# Patient Record
Sex: MALE | Race: WHITE | Employment: UNEMPLOYED | RURAL
[De-identification: names, ages, dates, MRNs, and addresses within clinical notes are randomized per-mention and may not be internally consistent; named-entity substitution may affect disease eponyms.]

---

## 2017-01-20 ENCOUNTER — OFFICE VISIT (OUTPATIENT)
Dept: FAMILY MEDICINE CLINIC | Age: 62
End: 2017-01-20

## 2017-01-20 VITALS
OXYGEN SATURATION: 97 % | HEART RATE: 66 BPM | SYSTOLIC BLOOD PRESSURE: 138 MMHG | RESPIRATION RATE: 12 BRPM | DIASTOLIC BLOOD PRESSURE: 86 MMHG | HEIGHT: 67 IN | TEMPERATURE: 97.2 F | WEIGHT: 175 LBS | BODY MASS INDEX: 27.47 KG/M2

## 2017-01-20 DIAGNOSIS — I21.4 NON-ST ELEVATION (NSTEMI) MYOCARDIAL INFARCTION (HCC): ICD-10-CM

## 2017-01-20 DIAGNOSIS — Z87.74 S/P SURGERY FOR COMPLEX CONGENITAL HEART DISEASE: ICD-10-CM

## 2017-01-20 DIAGNOSIS — I10 ESSENTIAL HYPERTENSION: ICD-10-CM

## 2017-01-20 DIAGNOSIS — K59.00 CONSTIPATION, UNSPECIFIED CONSTIPATION TYPE: ICD-10-CM

## 2017-01-20 DIAGNOSIS — E78.5 HYPERLIPIDEMIA, UNSPECIFIED HYPERLIPIDEMIA TYPE: ICD-10-CM

## 2017-01-20 DIAGNOSIS — R73.03 PREDIABETES: ICD-10-CM

## 2017-01-20 DIAGNOSIS — G62.9 NEUROPATHY: Primary | ICD-10-CM

## 2017-01-20 DIAGNOSIS — R10.32 LEFT LOWER QUADRANT PAIN: ICD-10-CM

## 2017-01-20 RX ORDER — OXYCODONE AND ACETAMINOPHEN 5; 325 MG/1; MG/1
1 TABLET ORAL
Qty: 15 TAB | Refills: 0 | Status: SHIPPED | OUTPATIENT
Start: 2017-01-20 | End: 2017-06-13 | Stop reason: SDUPTHER

## 2017-01-20 RX ORDER — ATORVASTATIN CALCIUM 10 MG/1
40 TABLET, FILM COATED ORAL DAILY
COMMUNITY
End: 2017-03-09 | Stop reason: ALTCHOICE

## 2017-01-20 RX ORDER — ALPRAZOLAM 0.25 MG/1
TABLET ORAL AS NEEDED
COMMUNITY
End: 2017-03-09

## 2017-01-20 RX ORDER — HYDROCODONE BITARTRATE AND ACETAMINOPHEN 5; 325 MG/1; MG/1
TABLET ORAL
COMMUNITY
End: 2017-01-20

## 2017-01-20 RX ORDER — ASPIRIN 81 MG/1
TABLET ORAL DAILY
COMMUNITY
End: 2018-12-11

## 2017-01-20 NOTE — PROGRESS NOTES
HISTORY OF PRESENT ILLNESS  Emerson Mathews is a 64 y.o. male. Chief Complaint   Patient presents with    Medication Refill       HPI  Has numbness in legs started after the abdominal aortic bypass surgery in 11/16 and 2 hernia surgeries with it   Then had MI and Cardiac bypass surgery in 11/16    Doing ok except soreness  But numbness in left leg from groin to foot, no loss of strength  Can walk but painful and tender  Pulling kind of pain and knee very tender  Can hardly walk  Discussed with the surgeon  Temporarily, but not getting better  Wanted to try Lyrica  Not currently on Neurontin, still has that at home  Needs referral to Neuro    Has pain management appt 2/3/17  Still out of work  Hydrocodone not helping  Percocet better and needs refill    On Xanax since surgeries  Anxiety attacks since then  Taking it prn, last 2 w ago    Constipation  Last BM 2 d ago  Tried Exlax  Worked only a little  Got Nausea yesterday    Here for BW    Review of Systems   Eyes: Positive for blurred vision (little). Respiratory: Negative for cough and shortness of breath. Cardiovascular: Negative for chest pain. Gastrointestinal: Positive for abdominal pain, constipation and nausea. Negative for blood in stool, diarrhea, melena and vomiting. Genitourinary: Positive for frequency. Negative for dysuria and urgency. Neurological: Negative for dizziness and headaches. Endo/Heme/Allergies: Negative for polydipsia. Past Medical History   Diagnosis Date    Bilateral hip pain     Colon polyps 2013    Diverticulitis     ED (erectile dysfunction)     Essential hypertension, benign 2006    Hypercholesterolemia     IGT (impaired glucose tolerance)     Metabolic syndrome     MI (myocardial infarction) ([facility] [address] 11/10/2016    Paresthesia of left foot     Smoker      Current Outpatient Prescriptions   Medication Sig Dispense Refill    atorvastatin (LIPITOR) 10 mg tablet Take  by mouth daily.       aspirin delayed-release 81 mg tablet Take  by mouth daily.  ALPRAZolam (XANAX) 0.25 mg tablet Take  by mouth as needed for Anxiety.  oxyCODONE-acetaminophen (PERCOCET) 5-325 mg per tablet Take 1 Tab by mouth every six (6) hours as needed for Pain. Max Daily Amount: 4 Tabs. 15 Tab 0    atenolol (TENORMIN) 50 mg tablet TAKE 1 TABLET BY MOUTH DAILY 90 Tab 3    Aspirin, Buffered 81 mg Tab Take  by mouth.  gabapentin (NEURONTIN) 300 mg capsule Take 1 Cap by mouth three (3) times daily. Indications: NEUROPATHIC PAIN 90 Cap 3     Allergies   Allergen Reactions    Simvastatin Myalgia     Visit Vitals    /86 (BP 1 Location: Right arm, BP Patient Position: Sitting)    Pulse 66    Temp 97.2 °F (36.2 °C) (Temporal)    Resp 12    Ht 5' 7\" (1.702 m)    Wt 175 lb (79.4 kg)    SpO2 97%    BMI 27.41 kg/m2       Physical Exam   Constitutional: He is oriented to person, place, and time. He appears well-developed and well-nourished. No distress. HENT:   Head: Normocephalic and atraumatic. Mouth/Throat: Oropharynx is clear and moist. No oropharyngeal exudate. Eyes: Conjunctivae and EOM are normal. Pupils are equal, round, and reactive to light. Cardiovascular: Normal rate and regular rhythm. Pulmonary/Chest: Effort normal and breath sounds normal.   Abdominal: There is tenderness. There is guarding. Musculoskeletal: He exhibits tenderness (left knee). He exhibits no edema. Lymphadenopathy:     He has no cervical adenopathy. Neurological: He is alert and oriented to person, place, and time. Skin: Skin is warm and dry. Surgical wounds healing well   Psychiatric: He has a normal mood and affect. Nursing note and vitals reviewed. ASSESSMENT and PLAN    ICD-10-CM ICD-9-CM    1. Neuropathy G62.9 355.9 oxyCODONE-acetaminophen (PERCOCET) 5-325 mg per tablet      REFERRAL TO NEUROLOGY   2. S/P surgery for complex congenital heart disease Z98.890 V45.89     Z87.74     3.  Non-ST elevation (NSTEMI) myocardial infarction ([facility] I21.4 410.70    4. Hyperlipidemia, unspecified hyperlipidemia type E78.5 272.4 LIPID PANEL WITH LDL/HDL RATIO      FL HANDLG&/OR CONVEY OF SPEC FOR TR OFFICE TO LAB      FL COLLECTION VENOUS BLOOD,VENIPUNCTURE   5. Left lower quadrant pain R10.32 789.04 CT ABD WO CONT   6. Constipation, unspecified constipation type K59.00 564.00    7. Essential hypertension I10 401.9 CBC WITH AUTOMATED DIFF      METABOLIC PANEL, COMPREHENSIVE   8.  Prediabetes R73.03 790.29 HEMOGLOBIN A1C W/O EAG   cont stool softeners and laxatives prn with pain meds

## 2017-01-20 NOTE — MR AVS SNAPSHOT
Visit Information Date & Time Provider Department Dept. Phone Encounter #  
 1/20/2017  9:20 AM Robson Stephen MD [address] [place] [address] [phone] [id number] Follow-up Instructions Return in about 3 months (around 4/20/2017). Follow-up and Disposition History Upcoming Health Maintenance Date Due FOBT Q 1 YEAR AGE 50-75 5/18/2005 ZOSTER VACCINE AGE 60> 5/18/2015 Pneumococcal 19-64 Medium Risk (1 of 1 - PPSV23) 5/19/2017* DTaP/Tdap/Td series (2 - Td) 8/6/2025 *Topic was postponed. The date shown is not the original due date. Allergies as of 1/20/2017  Review Complete On: 10/21/2016 By: Jn Wilson LPN Severity Noted Reaction Type Reactions Simvastatin  05/17/2014    Myalgia Current Immunizations  Reviewed on 8/6/2015 Name Date Influenza Vaccine 9/30/2016 Tdap 8/6/2015 Not reviewed this visit You Were Diagnosed With   
  
 Codes Comments Neuropathy    -  Primary ICD-10-CM: G62.9 ICD-9-CM: 355.9 S/P surgery for complex congenital heart disease     ICD-10-CM: Z98.890, Z87.74 ICD-9-CM: V45.89 Non-ST elevation (NSTEMI) myocardial infarction [facility])     ICD-10-CM: I21.4 ICD-9-CM: 410.70 Hyperlipidemia, unspecified hyperlipidemia type     ICD-10-CM: E78.5 ICD-9-CM: 272.4 Left lower quadrant pain     ICD-10-CM: R10.32 
ICD-9-CM: 789.04 Constipation, unspecified constipation type     ICD-10-CM: K59.00 ICD-9-CM: 564.00 Essential hypertension     ICD-10-CM: I10 
ICD-9-CM: 401.9 Prediabetes     ICD-10-CM: R73.03 
ICD-9-CM: 790.29 Vitals BP Pulse Temp Resp Height(growth percentile) 138/86 (BP 1 Location: Right arm, BP Patient Position: Sitting) 66 97.2 °F (36.2 °C) (Temporal) 12 5' 7\" (1.702 m) Weight(growth percentile) SpO2 BMI Smoking Status 175 lb (79.4 kg) 97% 27.41 kg/m2 Current Every Day Smoker Vitals History BMI and BSA Data Body Mass Index Body Surface Area  
 27.41 kg/m 2 1.94 m 2 Preferred Pharmacy Pharmacy Name Phone [facility] [facility], [address] [place] [address] Your Updated Medication List  
  
   
This list is accurate as of: 1/20/17 10:43 AM.  Always use your most recent med list.  
  
  
  
  
 ALPRAZolam 0.25 mg tablet Commonly known as:  Randell Kathya Take  by mouth as needed for Anxiety. aspirin delayed-release 81 mg tablet Take  by mouth daily. aspirin, buffered 81 mg Tab Take  by mouth. atenolol 50 mg tablet Commonly known as:  TENORMIN  
TAKE 1 TABLET BY MOUTH DAILY  
  
 atorvastatin 10 mg tablet Commonly known as:  LIPITOR Take  by mouth daily. gabapentin 300 mg capsule Commonly known as:  NEURONTIN Take 1 Cap by mouth three (3) times daily. Indications: NEUROPATHIC PAIN  
  
 oxyCODONE-acetaminophen 5-325 mg per tablet Commonly known as:  PERCOCET Take 1 Tab by mouth every six (6) hours as needed for Pain. Max Daily Amount: 4 Tabs. Prescriptions Printed Refills  
 oxyCODONE-acetaminophen (PERCOCET) 5-325 mg per tablet 0 Sig: Take 1 Tab by mouth every six (6) hours as needed for Pain. Max Daily Amount: 4 Tabs. Class: Print Route: Oral  
  
We Performed the Following CBC WITH AUTOMATED DIFF [08307 CPT(R)] HEMOGLOBIN A1C W/O EAG [88874 CPT(R)] LIPID PANEL WITH LDL/HDL RATIO [07188 CPT(R)] METABOLIC PANEL, COMPREHENSIVE [30670 CPT(R)] FL COLLECTION VENOUS BLOOD,VENIPUNCTURE [id number] CPT(R)] FL HANDLG&/OR CONVEY OF SPEC FOR TR OFFICE TO LAB [98002 CPT(R)] REFERRAL TO NEUROLOGY [GOL07 Custom] Comments:  
 Please evaluate patient for left leg neuropathy since surgery Follow-up Instructions Return in about 3 months (around 4/20/2017). To-Do List   
 01/20/2017 Imaging:  CT ABD WO CONT Referral Information Referral ID Referred By Referred To [id number] Hetal Brooks Not Available Visits Status Start Date End Date 1 New Request 1/20/17 1/20/18 If your referral has a status of pending review or denied, additional information will be sent to support the outcome of this decision. Introducing [facility]! Ayesha Eri introduces [employer] patient portal. Now you can access parts of your medical record, email your doctor's office, and request medication refills online. 1. In your internet browser, go to [email or web address] 2. Click on the First Time User? Click Here link in the Sign In box. You will see the New Member Sign Up page. 3. Enter your [employer] Access Code exactly as it appears below. You will not need to use this code after youve completed the sign-up process. If you do not sign up before the expiration date, you must request a new code. · [employer] Access Code: [facility] Expires: 4/20/2017  9:24 AM 
 
4. Enter the last four digits of your Social Security Number (xxxx) and Date of Birth (mm/dd/yyyy) as indicated and click Submit. You will be taken to the next sign-up page. 5. Create a [employer] ID. This will be your [employer] login ID and cannot be changed, so think of one that is secure and easy to remember. 6. Create a [employer] password. You can change your password at any time. 7. Enter your Password Reset Question and Answer. This can be used at a later time if you forget your password. 8. Enter your e-mail address. You will receive e-mail notification when new information is available in [address] 9. Click Sign Up. You can now view and download portions of your medical record. 10. Click the Download Summary menu link to download a portable copy of your medical information. If you have questions, please visit the Frequently Asked Questions section of the [employer] website. Remember, [employer] is NOT to be used for urgent needs. For medical emergencies, dial 911. Now available from your iPhone and Android! Please provide this summary of care documentation to your next provider. Your primary care clinician is listed as Bhargavi Hunt. If you have any questions after today's visit, please call [phone].

## 2017-01-22 LAB
ALBUMIN SERPL-MCNC: 4.4 G/DL (ref 3.6–4.8)
ALBUMIN/GLOB SERPL: 1.6 {RATIO} (ref 1.1–2.5)
ALP SERPL-CCNC: 87 IU/L (ref 39–117)
ALT SERPL-CCNC: 18 IU/L (ref 0–44)
AST SERPL-CCNC: 17 IU/L (ref 0–40)
BASOPHILS # BLD AUTO: 0 X10E3/UL (ref 0–0.2)
BASOPHILS NFR BLD AUTO: 1 %
BILIRUB SERPL-MCNC: 0.4 MG/DL (ref 0–1.2)
BUN SERPL-MCNC: 14 MG/DL (ref 8–27)
BUN/CREAT SERPL: 17 (ref 10–22)
CALCIUM SERPL-MCNC: 9.7 MG/DL (ref 8.6–10.2)
CHLORIDE SERPL-SCNC: 100 MMOL/L (ref 96–106)
CHOLEST SERPL-MCNC: 172 MG/DL (ref 100–199)
CO2 SERPL-SCNC: 25 MMOL/L (ref 18–29)
CREAT SERPL-MCNC: 0.82 MG/DL (ref 0.76–1.27)
EOSINOPHIL # BLD AUTO: 0.4 X10E3/UL (ref 0–0.4)
EOSINOPHIL NFR BLD AUTO: 5 %
ERYTHROCYTE [DISTWIDTH] IN BLOOD BY AUTOMATED COUNT: 14 % (ref 12.3–15.4)
GLOBULIN SER CALC-MCNC: 2.7 G/DL (ref 1.5–4.5)
GLUCOSE SERPL-MCNC: 98 MG/DL (ref 65–99)
HBA1C MFR BLD: 5.9 % (ref 4.8–5.6)
HCT VFR BLD AUTO: 44.2 % (ref 37.5–51)
HDLC SERPL-MCNC: 42 MG/DL
HGB BLD-MCNC: 14.4 G/DL (ref 12.6–17.7)
IMM GRANULOCYTES # BLD: 0 X10E3/UL (ref 0–0.1)
IMM GRANULOCYTES NFR BLD: 0 %
INTERPRETATION, 910389: NORMAL
LDLC SERPL CALC-MCNC: 90 MG/DL (ref 0–99)
LDLC/HDLC SERPL: 2.1 RATIO UNITS (ref 0–3.6)
LYMPHOCYTES # BLD AUTO: 2.4 X10E3/UL (ref 0.7–3.1)
LYMPHOCYTES NFR BLD AUTO: 31 %
MCH RBC QN AUTO: 27.4 PG (ref 26.6–33)
MCHC RBC AUTO-ENTMCNC: 32.6 G/DL (ref 31.5–35.7)
MCV RBC AUTO: 84 FL (ref 79–97)
MONOCYTES # BLD AUTO: 0.8 X10E3/UL (ref 0.1–0.9)
MONOCYTES NFR BLD AUTO: 10 %
NEUTROPHILS # BLD AUTO: 4.1 X10E3/UL (ref 1.4–7)
NEUTROPHILS NFR BLD AUTO: 53 %
PLATELET # BLD AUTO: 214 X10E3/UL (ref 150–379)
POTASSIUM SERPL-SCNC: 4.6 MMOL/L (ref 3.5–5.2)
PROT SERPL-MCNC: 7.1 G/DL (ref 6–8.5)
RBC # BLD AUTO: 5.25 X10E6/UL (ref 4.14–5.8)
SODIUM SERPL-SCNC: 141 MMOL/L (ref 134–144)
TRIGL SERPL-MCNC: 198 MG/DL (ref 0–149)
VLDLC SERPL CALC-MCNC: 40 MG/DL (ref 5–40)
WBC # BLD AUTO: 7.7 X10E3/UL (ref 3.4–10.8)

## 2017-01-23 NOTE — PROGRESS NOTES
Call pt, the Chol is good, except the Trig are high, cont current med and avoid fatty and sweet foods and recheck in 3 mo  The CBC is normal  The HbA1C is 5.9, in the prediabetes range, cont low conc sweets diet

## 2017-01-27 ENCOUNTER — OFFICE VISIT (OUTPATIENT)
Dept: NEUROLOGY | Age: 62
End: 2017-01-27

## 2017-01-27 VITALS
SYSTOLIC BLOOD PRESSURE: 140 MMHG | DIASTOLIC BLOOD PRESSURE: 70 MMHG | BODY MASS INDEX: 27.62 KG/M2 | HEIGHT: 67 IN | WEIGHT: 176 LBS | OXYGEN SATURATION: 97 % | HEART RATE: 74 BPM

## 2017-01-27 DIAGNOSIS — I10 ESSENTIAL HYPERTENSION: ICD-10-CM

## 2017-01-27 DIAGNOSIS — R10.32 LEFT LOWER QUADRANT PAIN: ICD-10-CM

## 2017-01-27 DIAGNOSIS — G58.8 OTHER MONONEUROPATHY: Primary | ICD-10-CM

## 2017-01-27 DIAGNOSIS — E78.5 HYPERLIPIDEMIA, UNSPECIFIED HYPERLIPIDEMIA TYPE: ICD-10-CM

## 2017-01-27 RX ORDER — PREGABALIN 75 MG/1
75 CAPSULE ORAL 2 TIMES DAILY
Qty: 21 CAP | Refills: 0 | Status: SHIPPED | COMMUNITY
Start: 2017-01-27 | End: 2017-03-09

## 2017-01-27 RX ORDER — PREGABALIN 50 MG/1
50 CAPSULE ORAL 2 TIMES DAILY
Qty: 21 CAP | Refills: 0 | Status: SHIPPED | COMMUNITY
Start: 2017-01-27 | End: 2017-03-09

## 2017-01-27 NOTE — PROGRESS NOTES
NEUROLOGY HISTORY AND PHYSICAL    Name Clarita Cerrato Age 64 y.o. MRN [id number]  1955     Consulting Physician: No att. providers found      Chief Complaint: left leg     Is a 54-year-old right-handed male with a medical history peripheral vascular disease. On November 10 he underwent bilateral bypass grafting of the femoral and popliteal artery. He suffered a myocardial infarction during surgery. On the  underwent four-vessel coronary artery bypass grafting. Prior to the femoral grafting he had suffered numbness on the dorsal aspect of the left foot as well as meralgia paresthetica on the right. After femoral bypass he began having pain and sensory loss around the knee and a stretching deep aching pain in the left groin. Harvesting for the  coronary artery bypass graft was performed in the right leg. He has no weakness of the left leg. He is very dysesthetic. States that ueven a cat rubbing up against his knee  causes significant pain. He is currently being managed with Percocet. He was prescribed gabapentin prior to his hospitalization but he felt that this was ineffective. He had been tried on Lyrica in the past and found it more effective. Unfortunately he ran out of his insurance while hospitalized and found the cost of lyrica prohibitive. His insurance has been reinstated. .   I have personally reviewed the chart   I have personally reviewed available imaging   I have the reviewed the available laboratory results. Assessment and Plan     1. Other mononeuropathy  May have been injured during the surgery or cath,   - pregabalin (LYRICA) 75 mg capsule; Take 1 Cap by mouth two (2) times a day. Max Daily Amount: 150 mg. Dispense: 21 Cap; Refill: 0  - REFERRAL TO NEUROLOGY  - EMG NCV MOTOR WITH F/WAVE PER NERVE; Future    2. Hyperlipidemia, unspecified hyperlipidemia type  Continue atorvastatin    3. Essential hypertension  Continue atenelol    4. Abnormal gait  Continue fall precautions  Will not be able to work for now. Patient Allergies    Simvastatin     Current Outpatient Prescriptions   Medication Sig Dispense Refill    atorvastatin (LIPITOR) 10 mg tablet Take  by mouth daily.  aspirin delayed-release 81 mg tablet Take  by mouth daily.  ALPRAZolam (XANAX) 0.25 mg tablet Take  by mouth as needed for Anxiety.  oxyCODONE-acetaminophen (PERCOCET) 5-325 mg per tablet Take 1 Tab by mouth every six (6) hours as needed for Pain. Max Daily Amount: 4 Tabs. 15 Tab 0    atenolol (TENORMIN) 50 mg tablet TAKE 1 TABLET BY MOUTH DAILY 90 Tab 3    gabapentin (NEURONTIN) 300 mg capsule Take 1 Cap by mouth three (3) times daily. Indications: NEUROPATHIC PAIN 90 Cap 3       Past Medical History   Diagnosis Date    Bilateral hip pain     Colon polyps 2013    Diverticulitis     ED (erectile dysfunction)     Essential hypertension, benign 2006    Hypercholesterolemia     IGT (impaired glucose tolerance)     Metabolic syndrome     MI (my[facility] infarction) ([facility] 11/10/2016    Paresthesia of left foot     Smoker        Social History     Social History    Marital status:      Spouse name: N/A    Number of children: N/A    Years of education: N/A     Occupational History    Not on file.      Social History Main Topics    Smoking status: Former Smoker     Years: 46.00     Types: Cigarettes    Smokeless tobacco: Former User     Quit date: 11/20/2016    Alcohol use No      Comment: rare    Drug use: No    Sexual activity: Yes     Partners: Female     Other Topics Concern    Blood Transfusions No    Sleep Concern Yes     Works swing shift    Exercise Yes    Seat Belt Yes    Self-Exams Yes     Social History Narrative       Family History   Problem Relation Age of Onset    Cancer Sister      cervical    Hypertension Brother        Review of Systems  negative positive   Neuro: Headaches, dizziness seizures, paresthesia,   lateralizing weakness, tremor, memory loss  Constitutional: Fever, chills, fatigue  Eyes: Visual disturbance, visual loss, pain  ENT: Hearing loss, tinnitus, pain  RESP: Cough shortness of breath sputum production   CVS: diaphoresis, palpatation  GI: nausea,  vomiting, constipation  :  dysuria nocturia incontinence  HEME:  bleeding lymphadenopathy petichiae  MSKL:arthalgia myalgia back pain  BHV:  anxiety, mood swings and sleep disturbance    Exam      Visit Vitals    /70    Pulse 74    Ht 5' 7\" (1.702 m)    Wt 176 lb (79.8 kg)    SpO2 97%    BMI 27.57 kg/m2        General: Well developed, well nourished. Patient in no apparent distress   Head: Normocephalic, atraumatic, anicteric sclera   Lungs:  Clear to auscultation bilaterally, No wheezes or rubs   Cardiac: regular, rate, rythm and no murmur   Abd: Bowel sounds were audible. No tenderness on palpation   Ext: No pedal edema   Skin: No overt signs of rash or insect bites     Neurological Exam:  Mental Status:  alert, and oriented person, place and time   Speech: Fluent, no aphasia and no dysarthria   Cranial Nerves:   Intact visual fields. Facial sensation is normal. Facial movement is symmetric. Palate is midline. Normal sternocleidomastoid strength. Tongue is midline. Hearing is intact bilaterally. Eyes: PERRL, EOM's full, no nystagmus, no ptosis. Motor:  Full and symmetric strength   Reflexes:   +2 and symmetric   Sensory:   symmetric and with distal sensory loss   Gait:   Uses cane and left. Tremor:   No and tremor. Cerebellar:  Finger to nose was demonstrated competently. Neurovascular: no carotid bruits. No JVD     Imaging    MRI Results (most recent):    Results from Orders Only encounter on 01/22/16   MRI HIP BI W WO CONT    CT Results (most recent):  No results found for this or any previous visit.     Lab Review  Lab Results   Component Value Date/Time    WBC 7.7 01/20/2017 10:38 AM    HCT 44.2 01/20/2017 10:38 AM    HGB 14.4 01/20/2017 10:38 AM    PLATELET 214 01/20/2017 10:38 AM     Lab Results   Component Value Date/Time    Sodium 141 01/20/2017 10:38 AM    Potassium 4.6 01/20/2017 10:38 AM    Chloride 100 01/20/2017 10:38 AM    CO2 25 01/20/2017 10:38 AM    Glucose 98 01/20/2017 10:38 AM    BUN 14 01/20/2017 10:38 AM    Creatinine 0.82 01/20/2017 10:38 AM    Calcium 9.7 01/20/2017 10:38 AM     No results found for: B12LT, FOL, RBCF  Lab Results   Component Value Date/Time    LDL, calculated 90 01/20/2017 10:38 AM     Lab Results   Component Value Date/Time    Hemoglobin A1c 5.9 01/20/2017 10:38 AM

## 2017-01-27 NOTE — PATIENT INSTRUCTIONS
[facility]   Statement to Patients  April 1, 2014      In an effort to ensure the large volume of patient prescription refills is processed in the most efficient and expeditious manner, we are asking our patients to assist us by calling your Pharmacy for all prescription refills, this will include also your  Mail Order Pharmacy. The pharmacy will contact our office electronically to continue the refill process. Please do not wait until the last minute to call your pharmacy. We need at least 48 hours (2days) to fill prescriptions. We also encourage you to call your pharmacy before going to  your prescription to make sure it is ready. With regard to controlled substance prescription refill requests (narcotic refills) that need to be picked up at our office, we ask your cooperation by providing us with at least 72 hours (3days) notice that you will need a refill. We will not refill narcotic prescription refill requests after 4:00pm on any weekday, Monday through Thursday, or after 2:00pm on Fridays, or on the weekends. We encourage everyone to explore another way of getting your prescription refill request processed using [employer], our patient web portal through our electronic medical record system. [employer] is an efficient and effective way to communicate your medication request directly to the office and  downloadable as an samuel on your smart phone . [employer] also features a review functionality that allows you to view your medication list as well as leave messages for your physician. Are you ready to get connected? If so please review the attatched instructions or speak to any of our staff to get you set up right away! Thank you so much for your cooperation. Should you have any questions please contact our Practice Administrator.     The Physicians and Staff,  [facility]

## 2017-01-31 ENCOUNTER — TELEPHONE (OUTPATIENT)
Dept: NEUROLOGY | Age: 62
End: 2017-01-31

## 2017-01-31 NOTE — TELEPHONE ENCOUNTER
Patient says the script for lyrica should be 150 mg. He also asks for a call back regarding forms from [employer] [facility] that should have been received by our office, for his employer.

## 2017-02-01 ENCOUNTER — TELEPHONE (OUTPATIENT)
Dept: NEUROLOGY | Age: 62
End: 2017-02-01

## 2017-02-01 RX ORDER — PREGABALIN 150 MG/1
150 CAPSULE ORAL 2 TIMES DAILY
Qty: 60 CAP | Refills: 3 | OUTPATIENT
Start: 2017-02-01 | End: 2017-03-09

## 2017-02-01 NOTE — TELEPHONE ENCOUNTER
Verbal order per Dr Shira Donohue. Called in as below  Requested Prescriptions     Pending Prescriptions Disp Refills    pregabalin (LYRICA) 150 mg capsule 60 Cap 3     Sig: Take 1 Cap by mouth two (2) times a day. Max Daily Amount: 300 mg.      Dr Shira Donohue is filling out paperwork currently for patient

## 2017-02-01 NOTE — TELEPHONE ENCOUNTER
Patient notified that script was called in. Paperwork needs to be in by 2/3/17.  Advised will fax over for him soon

## 2017-02-02 ENCOUNTER — TELEPHONE (OUTPATIENT)
Dept: NEUROLOGY | Age: 62
End: 2017-02-02

## 2017-02-02 NOTE — TELEPHONE ENCOUNTER
Patient states that [employer] recvd 4 out of the 5 pages, please send page 5 in order for pt to continue with his short term disability [id number]#[phone] please send form today pt would like a call back once done

## 2017-02-03 ENCOUNTER — TELEPHONE (OUTPATIENT)
Dept: NEUROLOGY | Age: 62
End: 2017-02-03

## 2017-02-03 NOTE — TELEPHONE ENCOUNTER
I called the [employer] and found that one of the pages did not go through all of the way. Refaxed.  Copy being mailed to the patient

## 2017-02-27 ENCOUNTER — OFFICE VISIT (OUTPATIENT)
Dept: NEUROLOGY | Age: 62
End: 2017-02-27

## 2017-02-27 DIAGNOSIS — M79.605 LEG PAIN, MEDIAL, LEFT: ICD-10-CM

## 2017-02-27 DIAGNOSIS — S74.12XD INJURY OF LEFT FEMORAL NERVE, SUBSEQUENT ENCOUNTER: Primary | ICD-10-CM

## 2017-02-27 DIAGNOSIS — E11.42 DIABETIC POLYNEUROPATHY ASSOCIATED WITH TYPE 2 DIABETES MELLITUS (HCC): ICD-10-CM

## 2017-02-27 NOTE — MR AVS SNAPSHOT
Visit Information Date & Time Provider Department Dept. Phone Encounter #  
 2/27/2017  8:00 AM Julian Merida MD Neurology Clinic at [facility] [phone] [id number] Follow-up Instructions Return in about 1 week (around 3/6/2017) for fu emg. Your Appointments 3/9/2017  9:00 AM  
ESTABLISHED PATIENT with Renato Gonzalez MD  
[address] [facility] ([address]) Appt Note: [facility] f/u mbm  
 [address] [phone]  
[address] [address]  
  
    
 4/20/2017  8:00 AM  
ESTABLISHED PATIENT with Renato Gonzalez MD  
[address] [facility] ([address]) Appt Note: 3 month f.up cp$0 1/20/17 hme  
 [address]  
[phone] Upcoming Health Maintenance Date Due FOBT Q 1 YEAR AGE 50-75 5/18/2005 ZOSTER VACCINE AGE 60> 5/18/2015 DTaP/Tdap/Td series (2 - Td) 8/6/2025 Allergies as of 2/27/2017  Review Complete On: 1/27/2017 By: Brenda Goodwin LPN Severity Noted Reaction Type Reactions Simvastatin  05/17/2014    Myalgia Current Immunizations  Reviewed on 8/6/2015 Name Date Influenza Vaccine 9/30/2016 Tdap 8/6/2015 Not reviewed this visit You Were Diagnosed With   
  
 Codes Comments Injury of left femoral nerve, subsequent encounter    -  Primary ICD-10-CM: L74.61OI ICD-9-CM: 287.4 Leg pain, medial, left     ICD-10-CM: M79.605 ICD-9-CM: 729.5 Vitals Smoking Status Former Smoker Preferred Pharmacy Pharmacy Name Phone [facility] [address], [address] Your Updated Medication List  
  
   
This list is accurate as of: 2/27/17  9:29 AM.  Always use your most recent med list.  
  
  
  
  
 ALPRAZolam 0.25 mg tablet Commonly known as:  Zena Schulte Take  by mouth as needed for Anxiety. aspirin delayed-release 81 mg tablet Take  by mouth daily. atenolol 50 mg tablet Commonly known as:  TENORMIN  
TAKE 1 TABLET BY MOUTH DAILY  
  
 atorvastatin 10 mg tablet Commonly known as:  LIPITOR Take  by mouth daily. gabapentin 300 mg capsule Commonly known as:  NEURONTIN Take 1 Cap by mouth three (3) times daily. Indications: NEUROPATHIC PAIN  
  
 oxyCODONE-acetaminophen 5-325 mg per tablet Commonly known as:  PERCOCET Take 1 Tab by mouth every six (6) hours as needed for Pain. Max Daily Amount: 4 Tabs. * pregabalin 75 mg capsule Commonly known as:  Walda Smoker Take 1 Cap by mouth two (2) times a day. Max Daily Amount: 150 mg.  
  
 * pregabalin 50 mg capsule Commonly known as:  Walda Smoker Take 1 Cap by mouth two (2) times a day. Max Daily Amount: 100 mg.  
  
 * pregabalin 150 mg capsule Commonly known as:  Walda Smoker Take 1 Cap by mouth two (2) times a day. Max Daily Amount: 300 mg.  
  
 * Notice: This list has 3 medication(s) that are the same as other medications prescribed for you. Read the directions carefully, and ask your doctor or other care provider to review them with you. Follow-up Instructions Return in about 1 week (around 3/6/2017) for fu emg. Introducing [facility]! Nell Davenport introduces [employer] patient portal. Now you can access parts of your medical record, email your doctor's office, and request medication refills online. 1. In your internet browser, go to [email or web address] 2. Click on the First Time User? Click Here link in the Sign In box. You will see the New Member Sign Up page. 3. Enter your [employer] Access Code exactly as it appears below. You will not need to use this code after youve completed the sign-up process. If you do not sign up before the expiration date, you must request a new code.  
 
· [employer] Access Code: [facility] 
 Expires: 4/20/2017  9:24 AM 
 
4. Enter the last four digits of your Social Security Number (xxxx) and Date of Birth (mm/dd/yyyy) as indicated and click Submit. You will be taken to the next sign-up page. 5. Create a [email or web address] ID. This will be your [email or web address] login ID and cannot be changed, so think of one that is secure and easy to remember. 6. Create a [email or web address].com password. You can change your password at any time. 7. Enter your Password Reset Question and Answer. This can be used at a later time if you forget your password. 8. Enter your e-mail address. You will receive e-mail notification when new information is available in [address] 9. Click Sign Up. You can now view and download portions of your medical record. 10. Click the Download Summary menu link to download a portable copy of your medical information. If you have questions, please visit the Frequently Asked Questions section of the [email or web address] website. Remember, [email or web address] is NOT to be used for urgent needs. For medical emergencies, dial 911. Now available from your iPhone and Android! Please provide this summary of care documentation to your next provider. Your primary care clinician is listed as Bhargavi Hunt. If you have any questions after today's visit, please call [phone].

## 2017-02-28 NOTE — PROCEDURES
[facility] [address] [facility] [address] [facility], [address] [facility]  Tel [phone]     Fax [phone]  Electrodiagnostic Study Report  Test Date:  2017    Patient: Sharon Damon : [id number] Physician: Melida Larios MD   Sex: Male  < Ref Phys: Melida Larios MD     Medications    EMG & NCV Findings:  Evaluation of the left Sup Fibular sensory and the right Sup Fibular sensory nerves showed no response (14 cm). The left sural sensory nerve showed prolonged distal peak latency (4.3 ms), reduced amplitude (4.3 µV), and decreased conduction velocity (Calf-Lat Mall, 33 m/s). The right sural sensory nerve showed no response (Calf). All remaining nerves (as indicated in the following tables) were within normal limits. All left vs. right side differences were within normal limits. All F Wave latencies were within normal limits. 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   .  Nerve Conduction Studies  Anti Sensory Summary Table     Stim Site NR Peak (ms) P-T Amp (µV) Site1 Site2 Delta-P (ms) Dist (cm) Stu (m/s)   Left Radial Anti Sensory (Base 1st Digit)   Wrist    2.1 21.6 Wrist Base 1st Digit 2.1 0.0    Left Sup Fibular Anti Sensory (Ant Lat Mall)   14 cm NR   14 cm Ant Lat Mall  14.0    Right Sup Fibular Anti Sensory (Ant Lat Mall)   14 cm NR   14 cm Ant Lat Mall  14.0    Left Sural Anti Sensory (Lat Mall)   Calf    4.3 4.3 Calf Lat Mall 4.2 14.0 33   Right Sural Anti Sensory (Lat Mall)   Calf NR   Calf Lat Mall  14.0      Motor Summary Table     Stim Site NR Onset (ms) O-P Amp (mV) Site1 Site2 Delta-0 (ms) Dist (cm) Stu (m/s)   Left Fibular Motor (Ext Dig Brev)   Ankle    3.9 2.7 B Fib Ankle 7.0 28.5 41   B Fib    10.9 2.5 Poplt B Fib 2.3 10.0 43   Poplt    13.2 2.4        Right Fibular Motor (Ext Dig Brev)   Ankle    4.3 3.1 B Fib Ankle 7.2 28.0 39   B Fib    11.5 2.5 Poplt B Fib 2.5 10.0 40   Poplt    14.0 2.6        Left Tibial Motor (Abd Bourne Brev)   Ankle    3.6 7.3 Knee Ankle 9.6 41.5 43   Knee    13.2 5.0        Right Tibial Motor (Abd Bourne Brev)   Ankle    4.1 7.7 Knee Ankle 9.6 37.5 39   Knee    13.7 4.9        Left Ulnar Motor (Abd Dig Minimi)   Wrist    2.8 8.3 B Elbow Wrist 3.3 18.5 56   B Elbow    6.1 7.0 A Elbow B Elbow 1.7 10.0 59   A Elbow    7.8 7.6          F Wave Studies     Min-F Max-F Dispersion Mean-F L-R Mean-F F/M Ratio F-M Lat (ms)   Left Tibial (Curs) (Abd Hallucis)   51.09 53.13 2.04 52.34  3.49 46.25        NR F-Lat (ms) L-R F-Lat (ms)   Left Tibial (Mrkrs) (Abd Hallucis)      54.78      EMG     Side Muscle Nerve Root Ins Act Fibs Psw Amp Dur Poly Recrt Comment   Left VastusMed Femoral L2-4 Incr 1+ 1+ Nml Nml 0 Rapid    Left VastusLat Femoral L2-4 Incr 1+ Nml Nml Nml 1+ Nml    Left RectFemoris Femoral L2-4 Nml Nml Nml Nml Nml 0 Nml    Left Lumbo Parasp Up Rami L1-2 Nml Nml Nml        Left Lumbo Parasp Mid Rami L3-4 Nml Nml Nml        Left Ext Dig Brev Dp Br Fibular L5, S1 Nml Nml Nml Nml Nml 0 Nml    Left AntTibialis Dp Br Fibular L4-5 Nml Nml Nml Nml Nml 0 Nml    Left Gastroc Tibial S1-2 Nml Nml Nml Nml Nml 0 Nml                                  __________________  Rashida Noyola M.D.

## 2017-03-07 ENCOUNTER — OFFICE VISIT (OUTPATIENT)
Dept: NEUROLOGY | Age: 62
End: 2017-03-07

## 2017-03-07 VITALS
OXYGEN SATURATION: 95 % | WEIGHT: 172 LBS | BODY MASS INDEX: 26.94 KG/M2 | HEART RATE: 67 BPM | DIASTOLIC BLOOD PRESSURE: 70 MMHG | SYSTOLIC BLOOD PRESSURE: 110 MMHG

## 2017-03-07 DIAGNOSIS — G57.22 FEMORAL NERVE LESION, LEFT: Primary | ICD-10-CM

## 2017-03-07 DIAGNOSIS — M79.605 PAIN OF LEFT LOWER EXTREMITY: ICD-10-CM

## 2017-03-07 RX ORDER — OXYCODONE AND ACETAMINOPHEN 5; 325 MG/1; MG/1
1 TABLET ORAL 2 TIMES DAILY
Qty: 60 TAB | Refills: 0 | Status: SHIPPED | OUTPATIENT
Start: 2017-03-07 | End: 2017-03-09 | Stop reason: SDUPTHER

## 2017-03-07 NOTE — MR AVS SNAPSHOT
Visit Information Date & Time Provider Department Dept. Phone Encounter #  
 3/7/2017 12:00 PM Richar Weldon MD Neurology Clinic at [facility] [phone] [id number] Follow-up Instructions Return in about 3 months (around 6/7/2017). Your Appointments 3/9/2017  9:00 AM  
ESTABLISHED PATIENT with Rafal Keith MD  
[address] [facility] ([address]) Appt Note: [facility] f/u mbm  
 [address] [phone]  
[address] [address]  
  
    
 4/20/2017  8:00 AM  
ESTABLISHED PATIENT with Rafal Keith MD  
[address] [facility] ([address]) Appt Note: 3 month f.up cp$0 1/20/17 hme  
 [address]  
[phone] Upcoming Health Maintenance Date Due FOBT Q 1 YEAR AGE 50-75 5/18/2005 ZOSTER VACCINE AGE 60> 5/18/2015 DTaP/Tdap/Td series (2 - Td) 8/6/2025 Allergies as of 3/7/2017  Review Complete On: 3/7/2017 By: Sidra Vines Severity Noted Reaction Type Reactions Simvastatin  05/17/2014    Myalgia Current Immunizations  Reviewed on 8/6/2015 Name Date Influenza Vaccine 9/30/2016 Tdap 8/6/2015 Not reviewed this visit You Were Diagnosed With   
  
 Codes Comments Femoral nerve lesion, left    -  Primary ICD-10-CM: G57.22 
ICD-9-CM: 355. 2 Vitals BP Pulse Weight(growth percentile) SpO2 BMI Smoking Status 110/70 67 172 lb (78 kg) 95% 26.94 kg/m2 Former Smoker Vitals History BMI and BSA Data Body Mass Index Body Surface Area  
 26.94 kg/m 2 1.92 m 2 Preferred Pharmacy Pharmacy Name Phone [facility] [address], [address] Your Updated Medication List  
  
   
This list is accurate as of: 3/7/17  1:33 PM.  Always use your most recent med list.  
  
  
  
  
 ALPRAZolam 0.25 mg tablet Commonly known as:  Volanda Pronto Take  by mouth as needed for Anxiety. aspirin delayed-release 81 mg tablet Take  by mouth daily. atenolol 50 mg tablet Commonly known as:  TENORMIN  
TAKE 1 TABLET BY MOUTH DAILY  
  
 atorvastatin 10 mg tablet Commonly known as:  LIPITOR Take  by mouth daily. gabapentin 300 mg capsule Commonly known as:  NEURONTIN Take 1 Cap by mouth three (3) times daily. Indications: NEUROPATHIC PAIN  
  
 * oxyCODONE-acetaminophen 5-325 mg per tablet Commonly known as:  PERCOCET Take 1 Tab by mouth every six (6) hours as needed for Pain. Max Daily Amount: 4 Tabs. * oxyCODONE-acetaminophen 5-325 mg per tablet Commonly known as:  PERCOCET Take 1 Tab by mouth two (2) times a day. Max Daily Amount: 2 Tabs. * pregabalin 75 mg capsule Commonly known as:  Reuel Xiang Take 1 Cap by mouth two (2) times a day. Max Daily Amount: 150 mg.  
  
 * pregabalin 50 mg capsule Commonly known as:  Reuel Xiang Take 1 Cap by mouth two (2) times a day. Max Daily Amount: 100 mg.  
  
 * pregabalin 150 mg capsule Commonly known as:  Reuel Xiang Take 1 Cap by mouth two (2) times a day. Max Daily Amount: 300 mg.  
  
 * Notice: This list has 5 medication(s) that are the same as other medications prescribed for you. Read the directions carefully, and ask your doctor or other care provider to review them with you. Prescriptions Printed Refills  
 oxyCODONE-acetaminophen (PERCOCET) 5-325 mg per tablet 0 Sig: Take 1 Tab by mouth two (2) times a day. Max Daily Amount: 2 Tabs. Class: Print Route: Oral  
  
We Performed the Following REFERRAL TO PAIN MANAGEMENT [DOK809 Custom] Comments:  
 [facility] For left femoral neuropathy REFERRAL TO PHYSICAL THERAPY [PQV17 Custom] Follow-up Instructions Return in about 3 months (around 6/7/2017). Referral Information Referral ID Referred By Referred To  
  
 [id number] Kasia Mason Not Available Visits Status Start Date End Date 1 New Request 3/7/17 3/7/18 If your referral has a status of pending review or denied, additional information will be sent to support the outcome of this decision. Referral ID Referred By Referred To  
 [id number] Kasia Mason Not Available Visits Status Start Date End Date 1 New Request 3/7/17 3/7/18 If your referral has a status of pending review or denied, additional information will be sent to support the outcome of this decision. Patient Instructions A Healthy Lifestyle: Care Instructions Your Care Instructions A healthy lifestyle can help you feel good, stay at a healthy weight, and have plenty of energy for both work and play. A healthy lifestyle is something you can share with your whole family. A healthy lifestyle also can lower your risk for serious health problems, such as high blood pressure, heart disease, and diabetes. You can follow a few steps listed below to improve your health and the health of your family. Follow-up care is a key part of your treatment and safety. Be sure to make and go to all appointments, and call your doctor if you are having problems. Its also a good idea to know your test results and keep a list of the medicines you take. How can you care for yourself at home? · Do not eat too much sugar, fat, or fast foods. You can still have dessert and treats now and then. The goal is moderation. · Start small to improve your eating habits. Pay attention to portion sizes, drink less juice and soda pop, and eat more fruits and vegetables. ¨ Eat a healthy amount of food. A 3-ounce serving of meat, for example, is about the size of a deck of cards. Fill the rest of your plate with vegetables and whole grains. ¨ Limit the amount of soda and sports drinks you have every day. Drink more water when you are thirsty. ¨ Eat at least 5 servings of fruits and vegetables every day. It may seem like a lot, but it is not hard to reach this goal. A serving or helping is 1 piece of fruit, 1 cup of vegetables, or 2 cups of leafy, raw vegetables. Have an apple or some carrot sticks as an afternoon snack instead of a candy bar. Try to have fruits and/or vegetables at every meal. 
· Make exercise part of your daily routine. You may want to start with simple activities, such as walking, bicycling, or slow swimming. Try to be active 30 to 60 minutes every day. You do not need to do all 30 to 60 minutes all at once. For example, you can exercise 3 times a day for 10 or 20 minutes. Moderate exercise is safe for most people, but it is always a good idea to talk to your doctor before starting an exercise program. 
· Keep moving. Juan Cihis the lawn, work in the garden, or TapIn.tv. Take the stairs instead of the elevator at work. · If you smoke, quit. People who smoke have an increased risk for heart attack, stroke, cancer, and other lung illnesses. Quitting is hard, but there are ways to boost your chance of quitting tobacco for good. ¨ Use nicotine gum, patches, or lozenges. ¨ Ask your doctor about stop-smoking programs and medicines. ¨ Keep trying. In addition to reducing your risk of diseases in the future, you will notice some benefits soon after you stop using tobacco. If you have shortness of breath or asthma symptoms, they will likely get better within a few weeks after you quit. · Limit how much alcohol you drink. Moderate amounts of alcohol (up to 2 drinks a day for men, 1 drink a day for women) are okay. But drinking too much can lead to liver problems, high blood pressure, and other health problems. Family health If you have a family, there are many things you can do together to improve your health. · Eat meals together as a family as often as possible. · Eat healthy foods.  This includes fruits, vegetables, lean meats and dairy, and whole grains. · Include your family in your fitness plan. Most people think of activities such as jogging or tennis as the way to fitness, but there are many ways you and your family can be more active. Anything that makes you breathe hard and gets your heart pumping is exercise. Here are some tips: 
¨ Walk to do errands or to take your child to school or the bus. ¨ Go for a family bike ride after dinner instead of watching TV. Where can you learn more? Go to http://addy-emma.info/. Enter W835 in the search box to learn more about \"A Healthy Lifestyle: Care Instructions. \" Current as of: July 26, 2016 Content Version: 11.1 © 6848-9908 [employer], [employer]. Care instructions adapted under license by [employer] (which disclaims liability or warranty for this information). If you have questions about a medical condition or this instruction, always ask your healthcare professional. [place] [address] any warranty or liability for your use of this information. Introducing [facility]! Guerrero Addison introduces [employer] patient portal. Now you can access parts of your medical record, email your doctor's office, and request medication refills online. 1. In your internet browser, go to [email or web address] 2. Click on the First Time User? Click Here link in the Sign In box. You will see the New Member Sign Up page. 3. Enter your [employer] Access Code exactly as it appears below. You will not need to use this code after youve completed the sign-up process. If you do not sign up before the expiration date, you must request a new code. · [employer] Access Code: [facility] Expires: 4/20/2017  9:24 AM 
 
4. Enter the last four digits of your Social Security Number (xxxx) and Date of Birth (mm/dd/yyyy) as indicated and click Submit. You will be taken to the next sign-up page. 5. Create a mYwindow ID. This will be your mY[employer] login ID and cannot be changed, so think of one that is secure and easy to remember. 6. Create a mYwindow password. You can change your password at any time. 7. Enter your Password Reset Question and Answer. This can be used at a later time if you forget your password. 8. Enter your e-mail address. You will receive e-mail notification when new information is available in [address] 9. Click Sign Up. You can now view and download portions of your medical record. 10. Click the Download Summary menu link to download a portable copy of your medical information. If you have questions, please visit the Frequently Asked Questions section of the [employer] website. Remember, [employer] is NOT to be used for urgent needs. For medical emergencies, dial 911. Now available from your iPhone and Android! Please provide this summary of care documentation to your next provider. Your primary care clinician is listed as Bhargavi Hunt. If you have any questions after today's visit, please call [phone].

## 2017-03-07 NOTE — PROGRESS NOTES
Chief Complaint: Femoral neuropathy    Tracey Shannon returns for follow-up visit. His wife accompanies him today. We discussed the results of the EMG. We discussed the atrophy in his upper thigh. We discussed his prognosis. He continues to experience 5-7 out of 10 pain of the left lower extremity. He has run out of his pain medication. I informed him that I do not routinely treat pain and that he was best of seeking the services of a pain management specialist.  I was agreeable to extend him a temporary prescription until he is established with pain management. His current pain is worst in the medial quadriceps region however it extends all the way down to his foot. He estimates the pain to be a 5-7/10 he has trouble sleeping at night and has trouble walking even short distances. The pain is described as a burning pain that is constant and on fluctuating. He could not tolerate Lyrica and did not repeat much benefit while on it. I had a long discussion with him and his wife about pain management and the importance of not becoming dependent on the medications. I told him that though narcotic medications were effective in the short run there were poor performers in the long run. I told him the longer he is on the paradox that he will eventually face is being dependent on the pain medication yet not achieving much pain relief. Assesment and Plan  1. Femoral nerve lesion, left    - REFERRAL TO PHYSICAL THERAPY  - REFERRAL TO PAIN MANAGEMENT    2. Pain of left lower extremity  Hydrocodone 5/325 take 1 every 12 hours as needed for pain    3. Left lower extremity weakness secondary to 1  Physical therapy. 45  minutes spent more than 50% spent in face to face  examination and discussion of treatment options and approach    Allergies  Simvastatin     Medications  Current Outpatient Prescriptions   Medication Sig    pregabalin (LYRICA) 150 mg capsule Take 1 Cap by mouth two (2) times a day.  Max Daily Amount: 300 mg.  pregabalin (LYRICA) 75 mg capsule Take 1 Cap by mouth two (2) times a day. Max Daily Amount: 150 mg.  pregabalin (LYRICA) 50 mg capsule Take 1 Cap by mouth two (2) times a day. Max Daily Amount: 100 mg.    atorvastatin (LIPITOR) 10 mg tablet Take  by mouth daily.  aspirin delayed-release 81 mg tablet Take  by mouth daily.  oxyCODONE-acetaminophen (PERCOCET) 5-325 mg per tablet Take 1 Tab by mouth every six (6) hours as needed for Pain. Max Daily Amount: 4 Tabs.  atenolol (TENORMIN) 50 mg tablet TAKE 1 TABLET BY MOUTH DAILY    ALPRAZolam (XANAX) 0.25 mg tablet Take  by mouth as needed for Anxiety.  gabapentin (NEURONTIN) 300 mg capsule Take 1 Cap by mouth three (3) times daily. Indications: NEUROPATHIC PAIN     No current facility-administered medications for this visit. Medical History  Past Medical History:   Diagnosis Date    Bilateral hip pain     Colon polyps 2013    Diverticulitis     ED (erectile dysfunction)     Essential hypertension, benign 2006    Hypercholesterolemia     IGT (impaired glucose tolerance)     Metabolic syndrome     [facility]    Paresthesia of left foot     Smoker        Review of Systems  Neuro:  headaches, dizziness, seizures, memory problems,   Suffers paresthesia, no  tremor suffers weakness   Constitutional: fevers, chills  fatigue  Eyes: wears contacts/glasses, has no visual disturbance, irritation  ENT: hearing loss, tinnitus, ear drainage  RESP: cough, sputum, hemoptysis  CVS: chest pain, dyspnea, palpitations  GI: nausea, vomiting, constipation  :dysuria, nocturia, urinary incontinence  HEME: bleeding, lymphadenopathy, petechiae  MSKL:suffers myalgias, arthralgias,   BHV: anxiety, mood swings, suffers sleep disturbance      Exam:    Visit Vitals    /70    Pulse 67    Wt 172 lb (78 kg)    SpO2 95%    BMI 26.94 kg/m2        General: Well developed, well nourished.  Patient in moderate discomfort   Head: Normocephalic, atraumatic, anicteric sclera   Lungs:  Clear to auscultation bilaterally, No wheezes or rubs   Cardiac: Regular rate and rhythm with no murmurs. Abd: Bowel sounds were audible. No tenderness on palpation   Ext: No pedal edema   Skin: No overt signs of rash or insect bites     Neurological Exam:  Mental Status: Alert and oriented to person place and time   Speech: Fluent no aphasia or dysarthria. Cranial Nerves:   Intact visual fields. Facial sensation is normal. Facial movement is symmetric. Palate is midline. Normal sternocleidomastoid strength. Tongue is midline. Hearing is intact bilaterally. Motor:  Full and symmetric strength of upper and lower proximal and distal muscles. Normal bulk and tone. Reflexes:   Deep tendon reflexes 2+/4 and symmetric. Sensory:   Symmetric upper extremity sensory perception in the lower extremities he has distal sensory loss in addition to accentuated sensory loss over the medial upper leg extending down to the lower leg. .   Gait:  Gait is limping and cane dependent    Tremor:   No tremor noted. Cerebellar:  Coordination intact. Neurovascular: No carotid bruits.  No JVD       Lab Review    Lab Results   Component Value Date/Time    WBC 7.7 01/20/2017 10:38 AM    HCT 44.2 01/20/2017 10:38 AM    HGB 14.4 01/20/2017 10:38 AM    PLATELET 867 18/77/5225 10:38 AM       Lab Results   Component Value Date/Time    Sodium 141 01/20/2017 10:38 AM    Potassium 4.6 01/20/2017 10:38 AM    Chloride 100 01/20/2017 10:38 AM    CO2 25 01/20/2017 10:38 AM    Glucose 98 01/20/2017 10:38 AM    BUN 14 01/20/2017 10:38 AM    Creatinine 0.82 01/20/2017 10:38 AM    Calcium 9.7 01/20/2017 10:38 AM         Lab Results   Component Value Date/Time    Hemoglobin A1c 5.9 01/20/2017 10:38 AM          Lab Results   Component Value Date/Time    Cholesterol, total 172 01/20/2017 10:38 AM    HDL Cholesterol 42 01/20/2017 10:38 AM    LDL, calculated 90 01/20/2017 10:38 AM    VLDL, calculated 40 01/20/2017 10:38 AM    Triglyceride 198 01/20/2017 10:38 AM

## 2017-03-07 NOTE — PATIENT INSTRUCTIONS

## 2017-03-09 ENCOUNTER — TELEPHONE (OUTPATIENT)
Dept: NEUROLOGY | Age: 62
End: 2017-03-09

## 2017-03-09 ENCOUNTER — TELEPHONE (OUTPATIENT)
Dept: FAMILY MEDICINE CLINIC | Age: 62
End: 2017-03-09

## 2017-03-09 ENCOUNTER — OFFICE VISIT (OUTPATIENT)
Dept: FAMILY MEDICINE CLINIC | Age: 62
End: 2017-03-09

## 2017-03-09 VITALS
BODY MASS INDEX: 27.62 KG/M2 | SYSTOLIC BLOOD PRESSURE: 135 MMHG | HEART RATE: 78 BPM | DIASTOLIC BLOOD PRESSURE: 81 MMHG | OXYGEN SATURATION: 98 % | RESPIRATION RATE: 12 BRPM | HEIGHT: 67 IN | WEIGHT: 176 LBS | TEMPERATURE: 98.4 F

## 2017-03-09 DIAGNOSIS — Z12.11 SCREENING FOR COLON CANCER: ICD-10-CM

## 2017-03-09 DIAGNOSIS — I10 ESSENTIAL HYPERTENSION WITH GOAL BLOOD PRESSURE LESS THAN 140/90: ICD-10-CM

## 2017-03-09 DIAGNOSIS — J44.1 CHRONIC OBSTRUCTIVE PULMONARY DISEASE WITH ACUTE EXACERBATION (HCC): Primary | ICD-10-CM

## 2017-03-09 DIAGNOSIS — G58.9 MONONEUROPATHY: ICD-10-CM

## 2017-03-09 DIAGNOSIS — E78.5 HYPERLIPIDEMIA, UNSPECIFIED HYPERLIPIDEMIA TYPE: ICD-10-CM

## 2017-03-09 RX ORDER — ATENOLOL 50 MG/1
TABLET ORAL
Qty: 90 TAB | Refills: 1 | Status: SHIPPED | OUTPATIENT
Start: 2017-03-09 | End: 2017-07-18 | Stop reason: SDUPTHER

## 2017-03-09 RX ORDER — ATORVASTATIN CALCIUM 40 MG/1
40 TABLET, FILM COATED ORAL DAILY
Qty: 90 TAB | Refills: 1 | Status: SHIPPED | OUTPATIENT
Start: 2017-03-09 | End: 2017-05-18 | Stop reason: SDUPTHER

## 2017-03-09 NOTE — MR AVS SNAPSHOT
Visit Information Date & Time Provider Department Dept. Phone Encounter #  
 3/9/2017  9:00 AM Shannan Lyles  [facility] [phone] [id number] Your Appointments 4/20/2017  8:00 AM  
ESTABLISHED PATIENT with Shannan Lyles MD  
[facility] ([facility]) Appt Note: 3 month f.up cp$0 1/20/17 radha Morales [facility] [address]  
[phone]  
  
   
 [address]  
  
    
 6/7/2017  1:40 PM  
Follow Up with Richard Gonzalez MD  
[facility]) Appt Note: f/u nerve lesion,lsw,03/07/17  
 [address] [place] [address], 
[address], [address] [address]  
[address] [facility], [address], [address] [address] Upcoming Health Maintenance Date Due FOBT Q 1 YEAR AGE 50-75 5/18/2005 ZOSTER VACCINE AGE 60> 5/18/2015 DTaP/Tdap/Td series (2 - Td) 8/6/2025 Allergies as of 3/9/2017  Review Complete On: 3/9/2017 By: Chris Caputo LPN Severity Noted Reaction Type Reactions Simvastatin  05/17/2014    Myalgia Current Immunizations  Reviewed on 8/6/2015 Name Date Influenza Vaccine 9/30/2016 Tdap 8/6/2015 Not reviewed this visit You Were Diagnosed With   
  
 Codes Comments Chronic obstructive pulmonary disease with acute exacerbation (HCC)    -  Primary ICD-10-CM: J44.1 ICD-9-CM: 491.21 Screening for colon cancer     ICD-10-CM: Z12.11 ICD-9-CM: V76.51 Essential hypertension with goal blood pressure less than 140/90     ICD-10-CM: I10 
ICD-9-CM: 401.9 Hyperlipidemia, unspecified hyperlipidemia type     ICD-10-CM: E78.5 ICD-9-CM: 272.4 Mononeuropathy     ICD-10-CM: G58.9 ICD-9-CM: 347. 9 Vitals BP Pulse Temp Resp Height(growth percentile)  135/81 (BP 1 Location: Right arm, BP Patient Position: Sitting) 78 98.4 °F (36.9 °C) (Temporal) 12 5' 7\" (1.702 m) Weight(growth percentile) SpO2 BMI Smoking Status 176 lb (79.8 kg) 98% 27.57 kg/m2 Former Smoker BMI and BSA Data Body Mass Index Body Surface Area  
 27.57 kg/m 2 1.94 m 2 Preferred Pharmacy Pharmacy Name Phone [address] [phone] Your Updated Medication List  
  
   
This list is accurate as of: 3/9/17  9:45 AM.  Always use your most recent med list.  
  
  
  
  
 aspirin delayed-release 81 mg tablet Take  by mouth daily. atenolol 50 mg tablet Commonly known as:  TENORMIN  
TAKE 1 TABLET BY MOUTH DAILY  
  
 atorvastatin 40 mg tablet Commonly known as:  LIPITOR Take 1 Tab by mouth daily. oxyCODONE-acetaminophen 5-325 mg per tablet Commonly known as:  PERCOCET Take 1 Tab by mouth every six (6) hours as needed for Pain. Max Daily Amount: 4 Tabs. Prescriptions Sent to Pharmacy Refills  
 atorvastatin (LIPITOR) 40 mg tablet 1 Sig: Take 1 Tab by mouth daily. Class: Normal  
 Pharmacy: [address], [address] Ph #: [phone] Route: Oral  
 atenolol (TENORMIN) 50 mg tablet 1 Sig: TAKE 1 TABLET BY MOUTH DAILY Class: Normal  
 Pharmacy: [address], [address] Ph #: [phone] We Performed the Following OCCULT BLOOD, IMMUNOASSAY (FIT) [id number] CPT(R)] To-Do List   
 03/09/2017 PFT:  PULMONARY FUNCTION TEST Introducing [facility]! Ayesha Hwang introduces [employer] patient portal. Now you can access parts of your medical record, email your doctor's office, and request medication refills online. 1. In your internet browser, go to [email or web address] 2. Click on the First Time User? Click Here link in the Sign In box. You will see the New Member Sign Up page. 3. Enter your [employer] Access Code exactly as it appears below. You will not need to use this code after youve completed the sign-up process. If you do not sign up before the expiration date, you must request a new code. · [employer] Access Code: [facility] Expires: 4/20/2017  9:24 AM 
 
4. Enter the last four digits of your Social Security Number (xxxx) and Date of Birth (mm/dd/yyyy) as indicated and click Submit. You will be taken to the next sign-up page. 5. Create a [employer] ID. This will be your [employer] login ID and cannot be changed, so think of one that is secure and easy to remember. 6. Create a [employer] password. You can change your password at any time. 7. Enter your Password Reset Question and Answer. This can be used at a later time if you forget your password. 8. Enter your e-mail address. You will receive e-mail notification when new information is available in [address] 9. Click Sign Up. You can now view and download portions of your medical record. 10. Click the Download Summary menu link to download a portable copy of your medical information. If you have questions, please visit the Frequently Asked Questions section of the [employer] website. Remember, [employer] is NOT to be used for urgent needs. For medical emergencies, dial 911. Now available from your iPhone and Android! Please provide this summary of care documentation to your next provider. Your primary care clinician is listed as Bhargavi Hunt. If you have any questions after today's visit, please call [phone].

## 2017-03-09 NOTE — PROGRESS NOTES
HISTORY OF PRESENT ILLNESS  Gissel Love is a 64 y.o. male. Chief Complaint   Patient presents with   [facility] Follow Up     [facility] 2/17/17 for 3 days - flu       HPI  Pt was diagnosed with Flu form 2/21-2/24  Was on Tamiflu and Levaquin  Finished all  Could not breathe  And diagnosed with poss COPD  rec PFT  100 % better now  Was smoking in the past  quit 11/16, [facility] 61   Still has 2nd hand smoke  Was on inhaler and steroids    Had test for nerve damage done  Femoral nerve damage and put him out of work  Getting disability  Stopped lyrica since not helping  Taking Percocet prn  Has referral to pain management and PT  Wants us to set it up    Card increased Lipitor to 40 mg  Needs Rx sent to mail order pharm    HTN  Needs Atenolol mail ordered    Review of Systems   Constitutional: Negative for fever. HENT: Negative for congestion. Respiratory: Negative for cough and shortness of breath. Cardiovascular: Negative for chest pain. Gastrointestinal: Positive for abdominal pain (from pain pills). Musculoskeletal:        Left leg pain     Past Medical History:   Diagnosis Date    Bilateral hip pain     Colon polyps 2013    Diverticulitis     ED (erectile dysfunction)     Essential hypertension, benign 2006    Hypercholesterolemia     IGT (impaired glucose tolerance)     Metabolic syndrome     [facility]    Mononeuropathy     femoral nerve left    PAD (peripheral artery disease) (HCC)     bilat    Paresthesia of left foot     Smoker      Current Outpatient Prescriptions   Medication Sig Dispense Refill    atorvastatin (LIPITOR) 40 mg tablet Take 1 Tab by mouth daily. 90 Tab 1    atenolol (TENORMIN) 50 mg tablet TAKE 1 TABLET BY MOUTH DAILY 90 Tab 1    aspirin delayed-release 81 mg tablet Take  by mouth daily.  oxyCODONE-acetaminophen (PERCOCET) 5-325 mg per tablet Take 1 Tab by mouth every six (6) hours as needed for Pain. Max Daily Amount: 4 Tabs.  15 Tab 0     Allergies   Allergen Reactions    Simvastatin Myalgia     Visit Vitals    /81 (BP 1 Location: Right arm, BP Patient Position: Sitting)    Pulse 78    Temp 98.4 °F (36.9 °C) (Temporal)    Resp 12    Ht 5' 7\" (1.702 m)    Wt 176 lb (79.8 kg)    SpO2 98%    BMI 27.57 kg/m2       Physical Exam   Constitutional: He is oriented to person, place, and time. He appears well-developed and well-nourished. No distress. HENT:   Head: Normocephalic and atraumatic. Eyes: Conjunctivae and EOM are normal.   Cardiovascular: Normal rate, regular rhythm and normal heart sounds. Pulmonary/Chest: Effort normal and breath sounds normal.   Musculoskeletal: He exhibits no edema. Left thigh muscle atrophy   Neurological: He is alert and oriented to person, place, and time. Skin: Skin is warm and dry. Psychiatric: He has a normal mood and affect. Nursing note and vitals reviewed. ASSESSMENT and PLAN    ICD-10-CM ICD-9-CM    1. Chronic obstructive pulmonary disease with acute exacerbation (HCC) J44.1 491.21 PULMONARY FUNCTION TEST   2. Screening for colon cancer Z12.11 V76.51 OCCULT BLOOD, IMMUNOASSAY (FIT)   3. Essential hypertension with goal blood pressure less than 140/90 I10 401.9 atenolol (TENORMIN) 50 mg tablet   4. Hyperlipidemia, unspecified hyperlipidemia type E78.5 272.4 atorvastatin (LIPITOR) 40 mg tablet   5.  Mononeuropathy G58.9 355.9    will try to set up PT and Pan management

## 2017-03-16 ENCOUNTER — DOCUMENTATION ONLY (OUTPATIENT)
Dept: FAMILY MEDICINE CLINIC | Age: 62
End: 2017-03-16

## 2017-03-16 NOTE — PROGRESS NOTES
Patient has pain management appointment scheduled for 04/10/2017 at 11:45 patient was notified about appointment with Dr Andrea Love office [phone]

## 2017-03-21 ENCOUNTER — DOCUMENTATION ONLY (OUTPATIENT)
Dept: NEUROLOGY | Age: 62
End: 2017-03-21

## 2017-03-21 NOTE — PROGRESS NOTES
Received a request from the [facility] that they haven't received the first fax from us, refaxed the information that was faxed over to them on 03/09, along with a confirmation page stating that the information was faxed and received by them

## 2017-03-23 ENCOUNTER — TELEPHONE (OUTPATIENT)
Dept: NEUROLOGY | Age: 62
End: 2017-03-23

## 2017-03-23 NOTE — TELEPHONE ENCOUNTER
----- Message from Leeann Beasley sent at 3/23/2017 10:01 AM EDT -----  Regarding: Dr. Estrellita Leigh [facility] [phone], is inquiring on the status of the medical records request.

## 2017-03-23 NOTE — TELEPHONE ENCOUNTER
Called [phone] and left a message for Sanjana White and advised the request was received for signature for record review

## 2017-03-29 ENCOUNTER — TELEPHONE (OUTPATIENT)
Dept: NEUROLOGY | Age: 62
End: 2017-03-29

## 2017-03-29 ENCOUNTER — DOCUMENTATION ONLY (OUTPATIENT)
Dept: NEUROLOGY | Age: 62
End: 2017-03-29

## 2017-03-29 NOTE — PROGRESS NOTES
Spoke with Mr. John Mcintyre and the patient stated that the letter needed to have something with his dx listed on it. He stated that it needed to be faxed do that he would be able to get his social security since he can't go back to work.  Advised will discuss the information provided with Dr Mercy Conte

## 2017-03-29 NOTE — TELEPHONE ENCOUNTER
Called [phone] and wasn't able to leave a message;    Spoke with Daphney Amos at [phone] and advised that the letter was ready and he requested for it to be faxed over to the  office

## 2017-03-30 ENCOUNTER — DOCUMENTATION ONLY (OUTPATIENT)
Dept: NEUROLOGY | Age: 62
End: 2017-03-30

## 2017-03-30 NOTE — PROGRESS NOTES
Faxed over the letter and office visits from 02/27 and 03/07 to [facility] and Yeny Clancy Attn: Lou Kumar [phone]

## 2017-03-31 ENCOUNTER — TELEPHONE (OUTPATIENT)
Dept: NEUROLOGY | Age: 62
End: 2017-03-31

## 2017-03-31 NOTE — TELEPHONE ENCOUNTER
Faxed over requested information to [facility], [facility]  to the claim number [id number] a total of 15 pages sent over    [phone]

## 2017-03-31 NOTE — TELEPHONE ENCOUNTER
Patient called states that [facility] did not recv the medical records that they requested and he did not recv any pay this week, pt left the name and nbr of the  Brandi Licea [phone] ext [phone]

## 2017-04-03 ENCOUNTER — DOCUMENTATION ONLY (OUTPATIENT)
Dept: NEUROLOGY | Age: 62
End: 2017-04-03

## 2017-04-03 NOTE — PROGRESS NOTES
The fax number for the [facility] hasn't been working. Jono Able been trying to call the number provided and the are not open yet.  Will call again to have the information refaxed

## 2017-04-03 NOTE — PROGRESS NOTES
Called and spoke with Breanna Mcdaniels from the [facility] and stated that the information was received.

## 2017-04-12 ENCOUNTER — TELEPHONE (OUTPATIENT)
Dept: NEUROLOGY | Age: 62
End: 2017-04-12

## 2017-04-12 NOTE — TELEPHONE ENCOUNTER
----- Message from [facility] sent at 4/12/2017  2:16 PM EDT -----  Regarding: Dr. Mary Valenzuela requesting a call back from [facility]. Pt best contact [phone].

## 2017-04-13 ENCOUNTER — TELEPHONE (OUTPATIENT)
Dept: NEUROLOGY | Age: 62
End: 2017-04-13

## 2017-04-13 NOTE — TELEPHONE ENCOUNTER
----- Message from Curt Mathews sent at 4/13/2017 11:24 AM EDT -----  Regarding: Dr. Geraldine Witt, (P) [phone], is inquiring on the request made on 04/12/17 to add that \"he is not able to work at this time\" in the letter to his  for [employer].

## 2017-04-13 NOTE — TELEPHONE ENCOUNTER
Spoke with the patient and advised that per Dr. Elisabet Holland patient he cannot work with heavy machinery. 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  Advised to the patient that we will need all of the information needed for his letter and we will then draft another one up for him

## 2017-04-18 ENCOUNTER — TELEPHONE (OUTPATIENT)
Dept: NEUROLOGY | Age: 62
End: 2017-04-18

## 2017-04-18 NOTE — TELEPHONE ENCOUNTER
----- Message from Naomi Walsh sent at 4/18/2017  9:34 AM EDT -----  Regarding: hegabi/telephone  Patient calling to verify that the doctor has completed his employment letter. Second call to the practice. Best contact number is [phone].

## 2017-04-18 NOTE — TELEPHONE ENCOUNTER
Called the patient and offered an appointment 04/24 @12pm     Patient will come in on that day to see Dr. Fernanda Raymond

## 2017-04-20 ENCOUNTER — OFFICE VISIT (OUTPATIENT)
Dept: FAMILY MEDICINE CLINIC | Age: 62
End: 2017-04-20

## 2017-04-20 VITALS
TEMPERATURE: 97.5 F | HEIGHT: 67 IN | RESPIRATION RATE: 14 BRPM | DIASTOLIC BLOOD PRESSURE: 80 MMHG | HEART RATE: 66 BPM | OXYGEN SATURATION: 99 % | BODY MASS INDEX: 28.25 KG/M2 | SYSTOLIC BLOOD PRESSURE: 158 MMHG | WEIGHT: 180 LBS

## 2017-04-20 DIAGNOSIS — E78.5 HYPERLIPIDEMIA, UNSPECIFIED HYPERLIPIDEMIA TYPE: ICD-10-CM

## 2017-04-20 DIAGNOSIS — E88.81 METABOLIC SYNDROME: ICD-10-CM

## 2017-04-20 DIAGNOSIS — K59.00 CONSTIPATION, UNSPECIFIED CONSTIPATION TYPE: ICD-10-CM

## 2017-04-20 DIAGNOSIS — R19.7 DIARRHEA, UNSPECIFIED TYPE: ICD-10-CM

## 2017-04-20 DIAGNOSIS — I10 ESSENTIAL HYPERTENSION: Primary | ICD-10-CM

## 2017-04-20 RX ORDER — LISINOPRIL 10 MG/1
10 TABLET ORAL DAILY
Qty: 30 TAB | Refills: 1 | Status: SHIPPED | OUTPATIENT
Start: 2017-04-20 | End: 2017-05-18 | Stop reason: SDUPTHER

## 2017-04-20 RX ORDER — PREGABALIN 150 MG/1
CAPSULE ORAL 2 TIMES DAILY
COMMUNITY
End: 2017-07-18

## 2017-04-20 NOTE — PROGRESS NOTES
Pt has FOBT test at home, but is \" too constipated to use. \"  Advised patient to get zoster vaccine.

## 2017-04-20 NOTE — PROGRESS NOTES
HISTORY OF PRESENT ILLNESS  Lynnae Fothergill is a 64 y.o. male. Chief Complaint   Patient presents with    Follow-up     3 mo       HPI  Doing better  Did PT for leg    Can't sleep, low back and hips hurt    Had normal BM until surgery  Constipated or diarrhea since  Then no control over bowels  Taking Fiber pills and softener and laxative  On Oxycodone    On Lipitor 40 now  Increased by Card  Needs BW    Has neuropathy in left leg  Now on Lyrica 150 bid      Review of Systems   Constitutional: Negative for weight loss. Respiratory: Negative for cough and shortness of breath. Cardiovascular: Negative for chest pain and leg swelling. Gastrointestinal: Positive for constipation and diarrhea. Negative for abdominal pain, blood in stool, melena, nausea and vomiting. Genitourinary: Negative for dysuria. Neurological: Positive for sensory change. Past Medical History:   Diagnosis Date    Bilateral hip pain     Colon polyps 2013    Diverticulitis     ED (erectile dysfunction)     Essential hypertension, benign 2006    Hypercholesterolemia     IGT (impaired glucose tolerance)     Metabolic syndrome     [facility]    Mononeuropathy     femoral nerve left    [facility] ([facility])     bilat    Paresthesia of left foot     Smoker      Current Outpatient Prescriptions   Medication Sig Dispense Refill    pregabalin (LYRICA) 150 mg capsule Take  by mouth two (2) times a day.  lisinopril (PRINIVIL, ZESTRIL) 10 mg tablet Take 1 Tab by mouth daily. 30 Tab 1    atorvastatin (LIPITOR) 40 mg tablet Take 1 Tab by mouth daily. 90 Tab 1    atenolol (TENORMIN) 50 mg tablet TAKE 1 TABLET BY MOUTH DAILY 90 Tab 1    aspirin delayed-release 81 mg tablet Take  by mouth daily.  oxyCODONE-acetaminophen (PERCOCET) 5-325 mg per tablet Take 1 Tab by mouth every six (6) hours as needed for Pain. Max Daily Amount: 4 Tabs.  15 Tab 0     Allergies   Allergen Reactions    Simvastatin Myalgia     Visit Vitals    /80 (BP 1 Location: Right arm, BP Patient Position: Sitting)    Pulse 66    Temp 97.5 °F (36.4 °C) (Temporal)    Resp 14    Ht 5' 7\" (1.702 m)    Wt 180 lb (81.6 kg)    SpO2 99%    BMI 28.19 kg/m2       Physical Exam   Constitutional: He is oriented to person, place, and time. He appears well-developed and well-nourished. No distress. HENT:   Head: Normocephalic and atraumatic. Eyes: Conjunctivae and EOM are normal.   Pulmonary/Chest: Effort normal.   Neurological: He is alert and oriented to person, place, and time. Skin: Skin is warm and dry. Psychiatric: He has a normal mood and affect. Nursing note and vitals reviewed. ASSESSMENT and PLAN    ICD-10-CM ICD-9-CM    1. Essential hypertension T44 604.9 METABOLIC PANEL, COMPREHENSIVE      lisinopril (PRINIVIL, ZESTRIL) 10 mg tablet   2. Metabolic syndrome W88.90 071.7 HEMOGLOBIN A1C W/O EAG   3. Hyperlipidemia, unspecified hyperlipidemia type E78.5 272.4 LIPID PANEL WITH LDL/HDL RATIO      NJ HANDLG&/OR CONVEY OF SPEC FOR TR OFFICE TO LAB      [place] COLLECTION VENOUS BLOOD,VENIPUNCTURE   4. Diarrhea, unspecified type R19.7 787.91    5.  Constipation, unspecified constipation type K59.00 564.00    cont stool softener and Fiber, avoid laxative to help regulate BM's  Avoid Oxycodone  Trial of holding Lyrica and see it that causes the constipation  Lisinopril 10 started  Recheck BP in 1 mo

## 2017-04-20 NOTE — MR AVS SNAPSHOT
Visit Information Date & Time Provider Department Dept. Phone Encounter #  
 4/20/2017  8:00 AM Marian López MD [address] [place] [address] [phone] [id number] Follow-up Instructions Return in about 4 weeks (around 5/18/2017). Follow-up and Disposition History Your Appointments 4/24/2017 12:00 PM  
Follow Up with Reena Scott MD  
[facility]) Appt Note: f/u,femoral nerve lesion,lsw,04/18/17  
 [address] [facility], 
[address], [address] [facility] [address]  
[address], [address], [address] [facility] [address]  
  
    
 6/7/2017  1:40 PM  
Follow Up with Reena Scott MD  
[facility]) Appt Note: f/u nerve lesion,lsw,03/07/17  
 [address] [place] [address], 
[address] [place] [address], [address] [facility]  
[phone] Upcoming Health Maintenance Date Due FOBT Q 1 YEAR AGE 50-75 5/18/2005 ZOSTER VACCINE AGE 60> 5/18/2015 Pneumococcal 19-64 Medium Risk (1 of 1 - PPSV23) 5/19/2017* DTaP/Tdap/Td series (2 - Td) 8/6/2025 *Topic was postponed. The date shown is not the original due date. Allergies as of 4/20/2017  Review Complete On: 4/20/2017 By: Jettie Harada, LPN Severity Noted Reaction Type Reactions Simvastatin  05/17/2014    Myalgia Current Immunizations  Reviewed on 8/6/2015 Name Date Influenza Vaccine 9/30/2016 Tdap 8/6/2015 Not reviewed this visit You Were Diagnosed With   
  
 Codes Comments Essential hypertension    -  Primary ICD-10-CM: I10 
ICD-9-CM: 401.9 Metabolic syndrome     VIX-51-LQ: P58.16 ICD-9-CM: 277.7 Hyperlipidemia, unspecified hyperlipidemia type     ICD-10-CM: E78.5 ICD-9-CM: 272.4 Diarrhea, unspecified type     ICD-10-CM: R19.7 ICD-9-CM: 787.91  Constipation, unspecified constipation type     ICD-10-CM: K59.00 
 ICD-9-CM: 564.00 Vitals BP Pulse Temp Resp Height(growth percentile) 158/80 (BP 1 Location: Right arm, BP Patient Position: Sitting) 66 97.5 °F (36.4 °C) (Temporal) 14 5' 7\" (1.702 m) Weight(growth percentile) SpO2 BMI Smoking Status 180 lb (81.6 kg) 99% 28.19 kg/m2 Former Smoker Vitals History BMI and BSA Data Body Mass Index Body Surface Area  
 28.19 kg/m 2 1.96 m 2 Preferred Pharmacy Pharmacy Name Phone [facility] [address], [address] [place] [address] Your Updated Medication List  
  
   
This list is accurate as of: 4/20/17  8:57 AM.  Always use your most recent med list.  
  
  
  
  
 aspirin delayed-release 81 mg tablet Take  by mouth daily. atenolol 50 mg tablet Commonly known as:  TENORMIN  
TAKE 1 TABLET BY MOUTH DAILY  
  
 atorvastatin 40 mg tablet Commonly known as:  LIPITOR Take 1 Tab by mouth daily. lisinopril 10 mg tablet Commonly known as:  Dulcie Dignaey Take 1 Tab by mouth daily. LYRICA 150 mg capsule Generic drug:  pregabalin Take  by mouth two (2) times a day. oxyCODONE-acetaminophen 5-325 mg per tablet Commonly known as:  PERCOCET Take 1 Tab by mouth every six (6) hours as needed for Pain. Max Daily Amount: 4 Tabs. Prescriptions Sent to Pharmacy Refills  
 lisinopril (PRINIVIL, ZESTRIL) 10 mg tablet 1 Sig: Take 1 Tab by mouth daily. Class: Normal  
 Pharmacy: [facility] [address], [address] [place] [address] [address] Ph #: [phone] Route: Oral  
  
We Performed the Following HEMOGLOBIN A1C W/O EAG [03909 CPT(R)] LIPID PANEL WITH LDL/HDL RATIO [57578 CPT(R)] METABOLIC PANEL, COMPREHENSIVE [67852 CPT(R)] MS COLLECTION VENOUS BLOOD,VENIPUNCTURE [id number] CPT(R)] MS HANDLG&/OR CONVEY OF SPEC FOR TR OFFICE TO LAB [12876 CPT(R)] Follow-up Instructions Return in about 4 weeks (around 5/18/2017). Introducing [facility]! Denise Bustillo introduces G-volution patient portal. Now you can access parts of your medical record, email your doctor's office, and request medication refills online. 1. In your internet browser, go to [email or web address] 2. Click on the First Time User? Click Here link in the Sign In box. You will see the New Member Sign Up page. 3. Enter your GKN - GloboKasNett Access Code exactly as it appears below. You will not need to use this code after youve completed the sign-up process. If you do not sign up before the expiration date, you must request a new code. · G-volution Access Code: [facility] Expires: 4/20/2017 10:24 AM 
 
4. Enter the last four digits of your Social Security Number (xxxx) and Date of Birth (mm/dd/yyyy) as indicated and click Submit. You will be taken to the next sign-up page. 5. Create a G-volution ID. This will be your G-volution login ID and cannot be changed, so think of one that is secure and easy to remember. 6. Create a G-volution password. You can change your password at any time. 7. Enter your Password Reset Question and Answer. This can be used at a later time if you forget your password. 8. Enter your e-mail address. You will receive e-mail notification when new information is available in [address] 9. Click Sign Up. You can now view and download portions of your medical record. 10. Click the Download Summary menu link to download a portable copy of your medical information. If you have questions, please visit the Frequently Asked Questions section of the G-volution website. Remember, G-volution is NOT to be used for urgent needs. For medical emergencies, dial 911. Now available from your iPhone and Android! Please provide this summary of care documentation to your next provider. Your primary care clinician is listed as Bhargavi Hunt. If you have any questions after today's visit, please call [phone].

## 2017-04-21 LAB
ALBUMIN SERPL-MCNC: 4.1 G/DL (ref 3.6–4.8)
ALBUMIN/GLOB SERPL: 1.6 {RATIO} (ref 1.2–2.2)
ALP SERPL-CCNC: 96 IU/L (ref 39–117)
ALT SERPL-CCNC: 17 IU/L (ref 0–44)
AST SERPL-CCNC: 16 IU/L (ref 0–40)
BILIRUB SERPL-MCNC: 0.5 MG/DL (ref 0–1.2)
BUN SERPL-MCNC: 12 MG/DL (ref 8–27)
BUN/CREAT SERPL: 13 (ref 10–24)
CALCIUM SERPL-MCNC: 9.8 MG/DL (ref 8.6–10.2)
CHLORIDE SERPL-SCNC: 99 MMOL/L (ref 96–106)
CHOLEST SERPL-MCNC: 161 MG/DL (ref 100–199)
CO2 SERPL-SCNC: 25 MMOL/L (ref 18–29)
CREAT SERPL-MCNC: 0.96 MG/DL (ref 0.76–1.27)
GLOBULIN SER CALC-MCNC: 2.5 G/DL (ref 1.5–4.5)
GLUCOSE SERPL-MCNC: 107 MG/DL (ref 65–99)
HBA1C MFR BLD: 6.5 % (ref 4.8–5.6)
HDLC SERPL-MCNC: 45 MG/DL
INTERPRETATION, 910389: NORMAL
LDLC SERPL CALC-MCNC: 88 MG/DL (ref 0–99)
LDLC/HDLC SERPL: 2 RATIO UNITS (ref 0–3.6)
POTASSIUM SERPL-SCNC: 4.6 MMOL/L (ref 3.5–5.2)
PROT SERPL-MCNC: 6.6 G/DL (ref 6–8.5)
SODIUM SERPL-SCNC: 140 MMOL/L (ref 134–144)
TRIGL SERPL-MCNC: 141 MG/DL (ref 0–149)
VLDLC SERPL CALC-MCNC: 28 MG/DL (ref 5–40)

## 2017-04-21 NOTE — PROGRESS NOTES
Call pt, the HbA1C is 6.5 in the diabetes range, avoid conc sweets and recheck in 6 mo if not better we may have to start meds  The Chol is good, cont current med  The kidney and liver tests are normal

## 2017-04-24 ENCOUNTER — OFFICE VISIT (OUTPATIENT)
Dept: NEUROLOGY | Age: 62
End: 2017-04-24

## 2017-04-24 VITALS
SYSTOLIC BLOOD PRESSURE: 116 MMHG | DIASTOLIC BLOOD PRESSURE: 72 MMHG | WEIGHT: 179 LBS | HEART RATE: 66 BPM | OXYGEN SATURATION: 98 % | BODY MASS INDEX: 28.04 KG/M2

## 2017-04-24 DIAGNOSIS — E78.5 HYPERLIPIDEMIA, UNSPECIFIED HYPERLIPIDEMIA TYPE: ICD-10-CM

## 2017-04-24 DIAGNOSIS — I10 ESSENTIAL HYPERTENSION: ICD-10-CM

## 2017-04-24 DIAGNOSIS — S74.12XD INJURY OF LEFT FEMORAL NERVE, SUBSEQUENT ENCOUNTER: Primary | ICD-10-CM

## 2017-04-24 DIAGNOSIS — R73.03 PREDIABETES: ICD-10-CM

## 2017-04-24 NOTE — MR AVS SNAPSHOT
Visit Information Date & Time Provider Department Dept. Phone Encounter #  
 4/24/2017 12:00 PM Junior Jose MD [facility]rology [facility] at [facility] [phone] [id number] Follow-up Instructions Return in about 6 months (around 10/24/2017). Your Appointments 6/7/2017  1:40 PM  
Follow Up with Junior Jose MD  
[facility] at [facility] Appt Note: f/u nerve lesion,lsw,03/07/17  
 [address] [place] [address], 
[address], [address] [address]  
[address], [address], [address] [address] Upcoming Health Maintenance Date Due FOBT Q 1 YEAR AGE 50-75 5/18/2005 ZOSTER VACCINE AGE 60> 5/18/2015 Pneumococcal 19-64 Medium Risk (1 of 1 - PPSV23) 5/19/2017* DTaP/Tdap/Td series (2 - Td) 8/6/2025 *Topic was postponed. The date shown is not the original due date. Allergies as of 4/24/2017  Review Complete On: 4/24/2017 By: Junior Jose MD  
  
 Severity Noted Reaction Type Reactions Simvastatin  05/17/2014    Myalgia Current Immunizations  Reviewed on 8/6/2015 Name Date Influenza Vaccine 9/30/2016 Tdap 8/6/2015 Not reviewed this visit You Were Diagnosed With   
  
 Codes Comments Injury of left femoral nerve, subsequent encounter    -  Primary ICD-10-CM: Q55.82QR ICD-9-CM: 036.1 Prediabetes     ICD-10-CM: R73.03 
ICD-9-CM: 790.29 Vitals BP Pulse Weight(growth percentile) SpO2 BMI Smoking Status 116/72 66 179 lb (81.2 kg) 98% 28.04 kg/m2 Former Smoker BMI and BSA Data Body Mass Index Body Surface Area 28.04 kg/m 2 1.96 m 2 Preferred Pharmacy Pharmacy Name Phone [facility] [address] [facility], [address] [place] [address] Street [address] Your Updated Medication List  
  
   
This list is accurate as of: 4/24/17 12:40 PM.  Always use your most recent med list.  
  
  
  
  
 aspirin delayed-release 81 mg tablet Take  by mouth daily. atenolol 50 mg tablet Commonly known as:  TENORMIN  
TAKE 1 TABLET BY MOUTH DAILY  
  
 atorvastatin 40 mg tablet Commonly known as:  LIPITOR Take 1 Tab by mouth daily. lisinopril 10 mg tablet Commonly known as:  Leighton Lofty Take 1 Tab by mouth daily. LYRICA 150 mg capsule Generic drug:  pregabalin Take  by mouth two (2) times a day. oxyCODONE-acetaminophen 5-325 mg per tablet Commonly known as:  PERCOCET Take 1 Tab by mouth every six (6) hours as needed for Pain. Max Daily Amount: 4 Tabs. Follow-up Instructions Return in about 6 months (around 10/24/2017). Patient Instructions A Healthy Lifestyle: Care Instructions Your Care Instructions A healthy lifestyle can help you feel good, stay at a healthy weight, and have plenty of energy for both work and play. A healthy lifestyle is something you can share with your whole family. A healthy lifestyle also can lower your risk for serious health problems, such as high blood pressure, heart disease, and diabetes. You can follow a few steps listed below to improve your health and the health of your family. Follow-up care is a key part of your treatment and safety. Be sure to make and go to all appointments, and call your doctor if you are having problems. Its also a good idea to know your test results and keep a list of the medicines you take. How can you care for yourself at home? · Do not eat too much sugar, fat, or fast foods. You can still have dessert and treats now and then. The goal is moderation. · Start small to improve your eating habits. Pay attention to portion sizes, drink less juice and soda pop, and eat more fruits and vegetables. ¨ Eat a healthy amount of food. A 3-ounce serving of meat, for example, is about the size of a deck of cards. Fill the rest of your plate with vegetables and whole grains. ¨ Limit the amount of soda and sports drinks you have every day. Drink more water when you are thirsty. ¨ Eat at least 5 servings of fruits and vegetables every day. It may seem like a lot, but it is not hard to reach this goal. A serving or helping is 1 piece of fruit, 1 cup of vegetables, or 2 cups of leafy, raw vegetables. Have an apple or some carrot sticks as an afternoon snack instead of a candy bar. Try to have fruits and/or vegetables at every meal. 
· Make exercise part of your daily routine. You may want to start with simple activities, such as walking, bicycling, or slow swimming. Try to be active 30 to 60 minutes every day. You do not need to do all 30 to 60 minutes all at once. For example, you can exercise 3 times a day for 10 or 20 minutes. Moderate exercise is safe for most people, but it is always a good idea to talk to your doctor before starting an exercise program. 
· Keep moving. [employer] the lawn, work in the garden, or [place]y. Take the stairs instead of the elevator at work. · If you smoke, quit. People who smoke have an increased risk for heart attack, stroke, cancer, and other lung illnesses. Quitting is hard, but there are ways to boost your chance of quitting tobacco for good. ¨ Use nicotine gum, patches, or lozenges. ¨ Ask your doctor about stop-smoking programs and medicines. ¨ Keep trying. In addition to reducing your risk of diseases in the future, you will notice some benefits soon after you stop using tobacco. If you have shortness of breath or asthma symptoms, they will likely get better within a few weeks after you quit. · Limit how much alcohol you drink. Moderate amounts of alcohol (up to 2 drinks a day for men, 1 drink a day for women) are okay. But drinking too much can lead to liver problems, high blood pressure, and other health problems. Family health If you have a family, there are many things you can do together to improve your health. · Eat meals together as a family as often as possible. · Eat healthy foods. This includes fruits, vegetables, lean meats and dairy, and whole grains. · Include your family in your fitness plan. Most people think of activities such as jogging or tennis as the way to fitness, but there are many ways you and your family can be more active. Anything that makes you breathe hard and gets your heart pumping is exercise. Here are some tips: 
¨ Walk to do errands or to take your child to school or the bus. ¨ Go for a family bike ride after dinner instead of watching TV. Where can you learn more? Go to http://addyLS9emma.info/. Enter L601 in the search box to learn more about \"A Healthy Lifestyle: Care Instructions. \" Current as of: July 26, 2016 Content Version: 11.2 © 6500-1040 [employer]. Care instructions adapted under license by [employer] (which disclaims liability or warranty for this information). If you have questions about a medical condition or this instruction, always ask your healthcare professional. [address] any warranty or liability for your use of this information. Introducing [facility]! Myah Lopezverfacundo introduces Ondax patient portal. Now you can access parts of your medical record, email your doctor's office, and request medication refills online. 1. In your internet browser, go to [email or web address] 2. Click on the First Time User? Click Here link in the Sign In box. You will see the New Member Sign Up page. 3. Enter your Ondax Access Code exactly as it appears below. You will not need to use this code after youve completed the sign-up process. If you do not sign up before the expiration date, you must request a new code. · Ondax Access Code: [email or web address] Expires: 7/23/2017 12:11 PM 
 
4.  Enter the last four digits of your Social Security Number (xxxx) and Date of Birth (mm/dd/yyyy) as indicated and click Submit. You will be taken to the next sign-up page. 5. Create a Watertronix ID. This will be your Watertronix login ID and cannot be changed, so think of one that is secure and easy to remember. 6. Create a Watertronix password. You can change your password at any time. 7. Enter your Password Reset Question and Answer. This can be used at a later time if you forget your password. 8. Enter your e-mail address. You will receive e-mail notification when new information is available in [address] 9. Click Sign Up. You can now view and download portions of your medical record. 10. Click the Download Summary menu link to download a portable copy of your medical information. If you have questions, please visit the Frequently Asked Questions section of the Watertronix website. Remember, Watertronix is NOT to be used for urgent needs. For medical emergencies, dial 911. Now available from your iPhone and Android! Please provide this summary of care documentation to your next provider. Your primary care clinician is listed as Bhargavi Hunt. If you have any questions after today's visit, please call [phone].

## 2017-04-24 NOTE — PATIENT INSTRUCTIONS

## 2017-04-24 NOTE — PROGRESS NOTES
Chief Complaint: Left femoral nerve injury    Mr. Hailey Benedict returns for follow-up visit. Since he was last seen he has had some trouble with his disability insurance. He had requested that I deemed him fit for work. I explained that he was not able to do this given his current injury. However, I explained to him that he was disabled from performing his current job. After reviewing his job description and found that he would be unable to perform 8 of the 14 examples that were stated in his job description. I agreed to furnished letter stating that he would be unable to return to work in the capacity of a  II. His pain and weakness continue to progress we discussed available options including microsurgery and nerve transplantation. I suggested that he seek a teaching institution such as [place] [address] to explore these options as I was not aware of any physicians in the private sector to perform these procedures. Assesment and Plan    1. Injury of left femoral nerve, subsequent encounter  Has worsening pain and weakness. Continue current medications. 2.  Hypertension  Continue Zestril    3. Hyperlipidemia  Continue atorvastatin    4. Prediabetes  Diet controlled    5. Gait disturbance  Fall precautions continue cane use. 30 minutes were spent in face-to-face discussion and explanation of his condition. Discussed with wife and the patient's presence. Allergies  Simvastatin     Medications  Current Outpatient Prescriptions   Medication Sig    pregabalin (LYRICA) 150 mg capsule Take  by mouth two (2) times a day.  lisinopril (PRINIVIL, ZESTRIL) 10 mg tablet Take 1 Tab by mouth daily.  atorvastatin (LIPITOR) 40 mg tablet Take 1 Tab by mouth daily.  atenolol (TENORMIN) 50 mg tablet TAKE 1 TABLET BY MOUTH DAILY    aspirin delayed-release 81 mg tablet Take  by mouth daily.     oxyCODONE-acetaminophen (PERCOCET) 5-325 mg per tablet Take 1 Tab by mouth every six (6) hours as needed for Pain. Max Daily Amount: 4 Tabs. No current facility-administered medications for this visit. Medical History  Past Medical History:   Diagnosis Date    Bilateral hip pain     Colon polyps 2013    Diverticulitis     ED (erectile dysfunction)     Essential hypertension, benign 2006    Hypercholesterolemia     IGT (impaired glucose tolerance)     Metabolic syndrome     MI [facility] infarction) ([facility] [address] 11/10/2016    Mononeuropathy     femoral nerve left    PAD (peripheral artery disease) ([facility])     bilat    Paresthesia of left foot     Smoker      Exam:    Visit Vitals    /72    Pulse 66    Wt 179 lb (81.2 kg)    SpO2 98%    BMI 28.04 kg/m2      Left vastus medialis atrophy and weakness. Dysthesia along the the anterior femoral cutaneous and saphenous nerve of the left leg  Walks with a cane.     Lab Review    Lab Results   Component Value Date/Time    WBC 7.7 01/20/2017 10:38 AM    HCT 44.2 01/20/2017 10:38 AM    HGB 14.4 01/20/2017 10:38 AM    PLATELET 881 50/92/3420 10:38 AM       Lab Results   Component Value Date/Time    Sodium 140 04/20/2017 08:27 AM    Potassium 4.6 04/20/2017 08:27 AM    Chloride 99 04/20/2017 08:27 AM    CO2 25 04/20/2017 08:27 AM    Glucose 107 04/20/2017 08:27 AM    BUN 12 04/20/2017 08:27 AM    Creatinine 0.96 04/20/2017 08:27 AM    Calcium 9.8 04/20/2017 08:27 AM         Lab Results   Component Value Date/Time    Hemoglobin A1c 6.5 04/20/2017 08:27 AM        Lab Results   Component Value Date/Time    Cholesterol, total 161 04/20/2017 08:27 AM    HDL Cholesterol 45 04/20/2017 08:27 AM    LDL, calculated 88 04/20/2017 08:27 AM    VLDL, calculated 28 04/20/2017 08:27 AM    Triglyceride 141 04/20/2017 08:27 AM

## 2017-05-04 ENCOUNTER — TELEPHONE (OUTPATIENT)
Dept: NEUROLOGY | Age: 62
End: 2017-05-04

## 2017-05-04 NOTE — TELEPHONE ENCOUNTER
Patient asks for a call back re Dr. Rylee Alfaro was supposed to send a letter to his employer regarding his condition, but they never received it. Please call.

## 2017-05-18 ENCOUNTER — OFFICE VISIT (OUTPATIENT)
Dept: FAMILY MEDICINE CLINIC | Age: 62
End: 2017-05-18

## 2017-05-18 VITALS
WEIGHT: 182.6 LBS | OXYGEN SATURATION: 98 % | RESPIRATION RATE: 16 BRPM | BODY MASS INDEX: 28.66 KG/M2 | HEIGHT: 67 IN | DIASTOLIC BLOOD PRESSURE: 71 MMHG | TEMPERATURE: 97.4 F | SYSTOLIC BLOOD PRESSURE: 126 MMHG | HEART RATE: 62 BPM

## 2017-05-18 DIAGNOSIS — G62.9 NEUROPATHY: Primary | ICD-10-CM

## 2017-05-18 DIAGNOSIS — I10 ESSENTIAL HYPERTENSION: ICD-10-CM

## 2017-05-18 DIAGNOSIS — E78.5 HYPERLIPIDEMIA, UNSPECIFIED HYPERLIPIDEMIA TYPE: ICD-10-CM

## 2017-05-18 RX ORDER — ATORVASTATIN CALCIUM 80 MG/1
80 TABLET, FILM COATED ORAL
COMMUNITY
Start: 2017-05-17 | End: 2017-05-18 | Stop reason: SDUPTHER

## 2017-05-18 RX ORDER — ALPRAZOLAM 0.25 MG/1
TABLET ORAL
COMMUNITY
Start: 2016-12-07 | End: 2017-07-18

## 2017-05-18 RX ORDER — VARENICLINE TARTRATE 0.5 MG/1
0.5 TABLET, FILM COATED ORAL
COMMUNITY
End: 2017-06-13 | Stop reason: ALTCHOICE

## 2017-05-18 RX ORDER — LISINOPRIL 10 MG/1
10 TABLET ORAL DAILY
Qty: 90 TAB | Refills: 1 | Status: SHIPPED | OUTPATIENT
Start: 2017-05-18 | End: 2017-07-18 | Stop reason: SDUPTHER

## 2017-05-18 RX ORDER — ATORVASTATIN CALCIUM 80 MG/1
80 TABLET, FILM COATED ORAL DAILY
Qty: 90 TAB | Refills: 1 | Status: SHIPPED | OUTPATIENT
Start: 2017-05-18 | End: 2017-07-18 | Stop reason: SDUPTHER

## 2017-05-18 NOTE — PROGRESS NOTES
HISTORY OF PRESENT ILLNESS  Jorge Alberto Montano is a 58 y.o. male. Chief Complaint   Patient presents with    Leg Pain     wants a handicapped parking sticker form filled out for his vehicle       HPI  Here for DMV form  Walking is limited to 50 feet before he needs to rest  D/t neuropathy in left leg  Using a cane  Hopes this is temporary    On Lyrica, helping some  Yesterday got worse with cutting grass and had to take Percocet  On perm disability for it now    Cardiologist increase Lipitor to 80 mg since  Needs Rx sent  Goal of LDL 70    HTN  BP good now  No SE  Needs refill on Lisinopril    Review of Systems   Respiratory: Negative for cough and shortness of breath. Cardiovascular: Negative for chest pain. Musculoskeletal: Positive for joint pain. Negative for myalgias. Neurological: Positive for sensory change. Past Medical History:   Diagnosis Date    Bilateral hip pain     Colon polyps 2013    COPD [facility]ronic obstructive [facility] disease) [facility] [address]     Diverticulitis     ED (erectile dysfunction)     Essential hypertension, benign 2006    Hypercholesterolemia     IGT (impaired glucose tolerance)     Metabolic syndrome     [facility]    Mononeuropathy     femoral nerve left    PAD (peripheral artery disease) ([facility])     bilat    Paresthesia of left foot     Smoker      Current Outpatient Prescriptions   Medication Sig Dispense Refill    ALPRAZolam (XANAX) 0.25 mg tablet XANAX 0.25 MG TABS      varenicline (CHANTIX) 0.5 mg tablet Take 0.5 mg by mouth.  lisinopril (PRINIVIL, ZESTRIL) 10 mg tablet Take 1 Tab by mouth daily. 90 Tab 1    atorvastatin (LIPITOR) 80 mg tablet Take 1 Tab by mouth daily. 90 Tab 1    pregabalin (LYRICA) 150 mg capsule Take  by mouth two (2) times a day.  atenolol (TENORMIN) 50 mg tablet TAKE 1 TABLET BY MOUTH DAILY 90 Tab 1    aspirin delayed-release 81 mg tablet Take  by mouth daily.       oxyCODONE-acetaminophen (PERCOCET) 5-325 mg per tablet Take 1 Tab by mouth every six (6) hours as needed for Pain. Max Daily Amount: 4 Tabs. 15 Tab 0     Allergies   Allergen Reactions    Simvastatin Myalgia     Visit Vitals    /71 (BP 1 Location: Right arm, BP Patient Position: Sitting)    Pulse 62    Temp 97.4 °F (36.3 °C) (Oral)    Resp 16    Ht 5' 7\" (1.702 m)    Wt 182 lb 9.6 oz (82.8 kg)    SpO2 98%    BMI 28.6 kg/m2       Physical Exam   Constitutional: He is oriented to person, place, and time. He appears well-developed and well-nourished. No distress. HENT:   Head: Normocephalic and atraumatic. Eyes: Conjunctivae and EOM are normal.   Cardiovascular: Normal rate and regular rhythm. Pulmonary/Chest: Effort normal and breath sounds normal.   Musculoskeletal: He exhibits no edema. Neurological: He is alert and oriented to person, place, and time. Skin: Skin is warm and dry. Psychiatric: He has a normal mood and affect. Nursing note and vitals reviewed. ASSESSMENT and PLAN    ICD-10-CM ICD-9-CM    1. Neuropathy G62.9 355.9 Form for DMV filled out  Cont Lyrica   2. Essential hypertension I10 401.9 lisinopril (PRINIVIL, ZESTRIL) 10 mg tablet   3.  Hyperlipidemia, unspecified hyperlipidemia type E78.5 272.4 atorvastatin (LIPITOR) 80 mg tablet   Recheck BW in 6 mo

## 2017-05-18 NOTE — PROGRESS NOTES
Health Maintenance Due   Topic Date Due    FOBT Q 1 YEAR AGE 50-75  05/18/2005  Pt has the kit at home but hasn't used it yet    ZOSTER VACCINE AGE 60>  05/18/2015  Pt will check with the pharmacy when he gets back on his feet with $     Vinod Blair RN

## 2017-05-18 NOTE — MR AVS SNAPSHOT
Visit Information Date & Time Provider Department Dept. Phone Encounter #  
 5/18/2017  9:20 AM Last Gipson MD [address] [place] [address] [phone] [id number] Follow-up Instructions Return in about 6 months (around 11/18/2017). Follow-up and Disposition History Your Appointments 10/31/2017 11:40 AM  
Follow Up with Junior Jose MD  
Neurology Clinic at [facility] Appt Note: f/u nerve lesion,lsw,03/07/17; patient reschedule; f/u femoral nerve lesion,lsw,04/24/17  
 [facility], 
[address] [place] [address], [address] [phone]  
[address] [facility], [address] [place] [address], [address] [facility] [address] [phone] Upcoming Health Maintenance Date Due FOBT Q 1 YEAR AGE 50-75 5/18/2005 ZOSTER VACCINE AGE 60> 5/18/2015 Pneumococcal 19-64 Medium Risk (1 of 1 - PPSV23) 5/19/2017* INFLUENZA AGE 9 TO ADULT 8/1/2017 DTaP/Tdap/Td series (2 - Td) 8/6/2025 *Topic was postponed. The date shown is not the original due date. Allergies as of 5/18/2017  Review Complete On: 5/18/2017 By: Vinod Blair RN Severity Noted Reaction Type Reactions Simvastatin  05/17/2014    Myalgia Current Immunizations  Reviewed on 8/6/2015 Name Date Influenza Vaccine 9/30/2016 Tdap 8/6/2015 Not reviewed this visit You Were Diagnosed With   
  
 Codes Comments Neuropathy    -  Primary ICD-10-CM: G62.9 ICD-9-CM: 355.9 Essential hypertension     ICD-10-CM: I10 
ICD-9-CM: 401.9 Hyperlipidemia, unspecified hyperlipidemia type     ICD-10-CM: E78.5 ICD-9-CM: 272.4 Vitals BP Pulse Temp Resp Height(growth percentile) Weight(growth percentile) 126/71 (BP 1 Location: Right arm, BP Patient Position: Sitting) 62 97.4 °F (36.3 °C) (Oral) 16 5' 7\" (1.702 m) 182 lb 9.6 oz (82.8 kg) SpO2 BMI Smoking Status 98% 28.6 kg/m2 Former Smoker BMI and BSA Data Body Mass Index Body Surface Area  
 28.6 kg/m 2 1.98 m 2 Preferred Pharmacy Pharmacy Name Phone [facility], [address] [place] [address] Se Your Updated Medication List  
  
   
This list is accurate as of: 5/18/17  9:51 AM.  Always use your most recent med list.  
  
  
  
  
 aspirin delayed-release 81 mg tablet Take  by mouth daily. atenolol 50 mg tablet Commonly known as:  TENORMIN  
TAKE 1 TABLET BY MOUTH DAILY  
  
 atorvastatin 80 mg tablet Commonly known as:  LIPITOR Take 1 Tab by mouth daily. lisinopril 10 mg tablet Commonly known as:  Ann Gravel Take 1 Tab by mouth daily. LYRICA 150 mg capsule Generic drug:  pregabalin Take  by mouth two (2) times a day. oxyCODONE-acetaminophen 5-325 mg per tablet Commonly known as:  PERCOCET Take 1 Tab by mouth every six (6) hours as needed for Pain. Max Daily Amount: 4 Tabs. varenicline 0.5 mg tablet Commonly known as:  Karly Borden Take 0.5 mg by mouth. XANAX 0.25 mg tablet Generic drug:  ALPRAZolam  
XANAX 0.25 MG TABS Prescriptions Sent to Pharmacy Refills  
 lisinopril (PRINIVIL, ZESTRIL) 10 mg tablet 1 Sig: Take 1 Tab by mouth daily. Class: Normal  
 Pharmacy: [facility] [place] [facility] [id number] Ph #: [phone] Route: Oral  
 atorvastatin (LIPITOR) 80 mg tablet 1 Sig: Take 1 Tab by mouth daily. Class: Normal  
 Pharmacy: [facility] [place] [facility] [address] Ph #: [phone] Route: Oral  
  
Follow-up Instructions Return in about 6 months (around 11/18/2017). Introducing [facility] & HEALTH SERVICES! [facility] introduces [employer] Go patient portal. Now you can access parts of your medical record, email your doctor's office, and request medication refills online. 1. In your internet browser, go to [email or web address] 2. Click on the First Time User? Click Here link in the Sign In box. You will see the New Member Sign Up page. 3. Enter your TapDog Access Code exactly as it appears below. You will not need to use this code after youve completed the sign-up process. If you do not sign up before the expiration date, you must request a new code. · TapDog Access Code: [email or web address] Expires: 7/23/2017 12:11 PM 
 
4. Enter the last four digits of your Social Security Number (xxxx) and Date of Birth (mm/dd/yyyy) as indicated and click Submit. You will be taken to the next sign-up page. 5. Create a TapDog ID. This will be your TapDog login ID and cannot be changed, so think of one that is secure and easy to remember. 6. Create a TapDog password. You can change your password at any time. 7. Enter your Password Reset Question and Answer. This can be used at a later time if you forget your password. 8. Enter your e-mail address. You will receive e-mail notification when new information is available in [address] 9. Click Sign Up. You can now view and download portions of your medical record. 10. Click the Download Summary menu link to download a portable copy of your medical information. If you have questions, please visit the Frequently Asked Questions section of the TapDog website. Remember, TapDog is NOT to be used for urgent needs. For medical emergencies, dial 911. Now available from your iPhone and Android! Please provide this summary of care documentation to your next provider. Your primary care clinician is listed as Bhargavi Hunt. If you have any questions after today's visit, please call [phone].

## 2017-05-22 DIAGNOSIS — J44.1 CHRONIC OBSTRUCTIVE PULMONARY DISEASE WITH ACUTE EXACERBATION (HCC): ICD-10-CM

## 2017-05-23 NOTE — TELEPHONE ENCOUNTER
Received a message about a letter about work however one was already sent over so just called to verify if the message I received was old or not       -Verified with the patient that everything was fine

## 2017-06-13 ENCOUNTER — DOCUMENTATION ONLY (OUTPATIENT)
Dept: FAMILY MEDICINE CLINIC | Age: 62
End: 2017-06-13

## 2017-06-13 ENCOUNTER — OFFICE VISIT (OUTPATIENT)
Dept: FAMILY MEDICINE CLINIC | Age: 62
End: 2017-06-13

## 2017-06-13 VITALS
WEIGHT: 187.2 LBS | TEMPERATURE: 98.5 F | HEART RATE: 69 BPM | SYSTOLIC BLOOD PRESSURE: 125 MMHG | RESPIRATION RATE: 18 BRPM | DIASTOLIC BLOOD PRESSURE: 66 MMHG | BODY MASS INDEX: 29.32 KG/M2

## 2017-06-13 DIAGNOSIS — G62.9 NEUROPATHY: ICD-10-CM

## 2017-06-13 DIAGNOSIS — M79.605 LEFT LEG PAIN: Primary | ICD-10-CM

## 2017-06-13 RX ORDER — OXYCODONE AND ACETAMINOPHEN 5; 325 MG/1; MG/1
1 TABLET ORAL
Qty: 15 TAB | Refills: 0 | Status: SHIPPED | OUTPATIENT
Start: 2017-06-13 | End: 2017-09-28

## 2017-06-13 RX ORDER — AMITRIPTYLINE HYDROCHLORIDE 25 MG/1
25 TABLET, FILM COATED ORAL
Qty: 30 TAB | Refills: 0 | Status: SHIPPED | OUTPATIENT
Start: 2017-06-13 | End: 2017-07-18 | Stop reason: DRUGHIGH

## 2017-06-13 NOTE — PROGRESS NOTES
HISTORY OF PRESENT ILLNESS  Jeanna Adams is a 58 y.o. male. Chief Complaint   Patient presents with    Leg Pain     nerve damage left leg from prior sx       HPI  Has a lot of pain  4/10-10/10 occ  Stabbing, very sharp  From groin  Loosing muscle in left thigh and radiating to left foot  Trying to go to sleep  Has seen pain management  Has not seen Neuro since Darrin  Saw Dr Baltazar Sequeira  On new Insurance  Would like refill of Lyrica 150 bid refilled  Still has 2 refills  Next appt with Neuro Oct  Taking Tylenol and Ibuprofen but not working    Has not tried Neurontin or Cymbalta    Review of Systems   Neurological: Positive for tingling, sensory change and focal weakness. Past Medical History:   Diagnosis Date    Bilateral hip pain     Colon polyps 2013    COPD (chronic obstructive pulmonary disease) ([facility] 04/2017    moderate    Diverticulitis     ED (erectile dysfunction)     Essential hypertension, benign 2006    Hypercholesterolemia     IGT (impaired glucose tolerance)     Metabolic syndrome     [facility]    Mononeuropathy     femoral nerve left    PAD (peripheral artery disease) (HCC)     bilat    Paresthesia of left foot     Smoker      Current Outpatient Prescriptions   Medication Sig Dispense Refill    amitriptyline (ELAVIL) 25 mg tablet Take 1 Tab by mouth nightly. Indications: NEUROPATHIC PAIN 30 Tab 0    oxyCODONE-acetaminophen (PERCOCET) 5-325 mg per tablet Take 1 Tab by mouth every six (6) hours as needed for Pain. Max Daily Amount: 4 Tabs. 15 Tab 0    ALPRAZolam (XANAX) 0.25 mg tablet XANAX 0.25 MG TABS      lisinopril (PRINIVIL, ZESTRIL) 10 mg tablet Take 1 Tab by mouth daily. 90 Tab 1    atorvastatin (LIPITOR) 80 mg tablet Take 1 Tab by mouth daily. 90 Tab 1    pregabalin (LYRICA) 150 mg capsule Take  by mouth two (2) times a day.       atenolol (TENORMIN) 50 mg tablet TAKE 1 TABLET BY MOUTH DAILY 90 Tab 1    aspirin delayed-release 81 mg tablet Take  by mouth daily. Allergies   Allergen Reactions    Simvastatin Myalgia     Visit Vitals    /66 (BP 1 Location: Left arm, BP Patient Position: Sitting)    Pulse 69    Temp 98.5 °F (36.9 °C) (Oral)    Resp 18    Wt 187 lb 3.2 oz (84.9 kg)    BMI 29.32 kg/m2     Physical Exam   Constitutional: He is oriented to person, place, and time. He appears well-developed and well-nourished. No distress. HENT:   Head: Normocephalic and atraumatic. Eyes: Conjunctivae and EOM are normal.   Cardiovascular: Normal rate and regular rhythm. Pulmonary/Chest: Effort normal and breath sounds normal.   Musculoskeletal: He exhibits no edema. Neurological: He is alert and oriented to person, place, and time. He exhibits abnormal muscle tone (left leg). Atrophic muscle in left thigh   Skin: Skin is warm and dry. Nursing note and vitals reviewed. ASSESSMENT and PLAN    ICD-10-CM ICD-9-CM    1. Left leg pain M79.605 729.5 amitriptyline (ELAVIL) 25 mg tablet      oxyCODONE-acetaminophen (PERCOCET) 5-325 mg per tablet   2.  Neuropathy G62.9 355.9 REFERRAL TO PAIN MANAGEMENT   cont Lyrica 150 bid  F/U with Neuro and pain management

## 2017-06-13 NOTE — MR AVS SNAPSHOT
Visit Information Date & Time Provider Department Dept. Phone Encounter #  
 6/13/2017  2:20 PM Meena Gonzalez MD [address] [facility] [id number] Follow-up Instructions Return in about 2 weeks (around 6/27/2017). Follow-up and Disposition History Your Appointments 10/31/2017 11:40 AM  
Follow Up with Maria Del Carmen Rosales MD  
Neurology Clinic at [facility] [address]) Appt Note: f/u nerve lesion,lsw,03/07/17; patient reschedule; f/u femoral nerve lesion,lsw,04/24/17  
 [facility], 
[address], [address] [phone]  
[address] [facility], [address], [address] [facility] [address] [address] Upcoming Health Maintenance Date Due Pneumococcal 19-64 Medium Risk (1 of 1 - PPSV23) 5/18/1974 FOBT Q 1 YEAR AGE 50-75 5/18/2005 ZOSTER VACCINE AGE 60> 5/18/2015 INFLUENZA AGE 9 TO ADULT 8/1/2017 DTaP/Tdap/Td series (2 - Td) 8/6/2025 Allergies as of 6/13/2017  Review Complete On: 6/13/2017 By: Vidya Lagos LPN Severity Noted Reaction Type Reactions Simvastatin  05/17/2014    Myalgia Current Immunizations  Reviewed on 8/6/2015 Name Date Influenza Vaccine 9/30/2016 Tdap 8/6/2015 Not reviewed this visit You Were Diagnosed With   
  
 Codes Comments Left leg pain    -  Primary ICD-10-CM: M79.605 ICD-9-CM: 729.5 Neuropathy     ICD-10-CM: G62.9 ICD-9-CM: 041. 9 Vitals BP Pulse Temp Resp Weight(growth percentile) BMI  
 125/66 (BP 1 Location: Left arm, BP Patient Position: Sitting) 69 98.5 °F (36.9 °C) (Oral) 18 187 lb 3.2 oz (84.9 kg) 29.32 kg/m2 Smoking Status Former Smoker BMI and BSA Data Body Mass Index Body Surface Area  
 29.32 kg/m 2 2 m 2 Preferred Pharmacy Pharmacy Name Phone [facility] [address] [place], [address] [facility] [address] Se Your Updated Medication List  
  
   
This list is accurate as of: 6/13/17  3:30 PM.  Always use your most recent med list.  
  
  
  
  
 amitriptyline 25 mg tablet Commonly known as:  ELAVIL Take 1 Tab by mouth nightly. Indications: NEUROPATHIC PAIN  
  
 aspirin delayed-release 81 mg tablet Take  by mouth daily. atenolol 50 mg tablet Commonly known as:  TENORMIN  
TAKE 1 TABLET BY MOUTH DAILY  
  
 atorvastatin 80 mg tablet Commonly known as:  LIPITOR Take 1 Tab by mouth daily. lisinopril 10 mg tablet Commonly known as:  Carole Derek Take 1 Tab by mouth daily. LYRICA 150 mg capsule Generic drug:  pregabalin Take  by mouth two (2) times a day. oxyCODONE-acetaminophen 5-325 mg per tablet Commonly known as:  PERCOCET Take 1 Tab by mouth every six (6) hours as needed for Pain. Max Daily Amount: 4 Tabs. XANAX 0.25 mg tablet Generic drug:  ALPRAZolam  
XANAX 0.25 MG TABS Prescriptions Printed Refills  
 oxyCODONE-acetaminophen (PERCOCET) 5-325 mg per tablet 0 Sig: Take 1 Tab by mouth every six (6) hours as needed for Pain. Max Daily Amount: 4 Tabs. Class: Print Route: Oral  
  
Prescriptions Sent to Pharmacy Refills  
 amitriptyline (ELAVIL) 25 mg tablet 0 Sig: Take 1 Tab by mouth nightly. Indications: NEUROPATHIC PAIN Class: Normal  
 [facility]: [facility] [address] #: [phone] Route: Oral  
  
We Performed the Following REFERRAL TO PAIN MANAGEMENT [OEO278 Custom] Comments:  
 Please evaluate patient for left thigh Neuropathy s/t bypass surgery for PAD Follow-up Instructions Return in about 2 weeks (around 6/27/2017). Referral Information Referral ID Referred By Referred To  
  
 [id number] [place] [facility] Not Available Visits Status Start Date End Date 1 New Request 6/13/17 6/13/18 If your referral has a status of pending review or denied, additional information will be sent to support the outcome of this decision. Introducing [facility] & HEALTH SERVICES! [facility] introduces [employer] patient portal. Now you can access parts of your medical record, email your doctor's office, and request medication refills online. 1. In your internet browser, go to [email or web address] 2. Click on the First Time User? Click Here link in the Sign In box. You will see the New Member Sign Up page. 3. Enter your En[employer] Access Code exactly as it appears below. You will not need to use this code after youve completed the sign-up process. If you do not sign up before the expiration date, you must request a new code. · [employer] Access Code: [email or web address] Expires: 7/23/2017 12:11 PM 
 
4. Enter the last four digits of your Social Security Number (xxxx) and Date of Birth (mm/dd/yyyy) as indicated and click Submit. You will be taken to the next sign-up page. 5. Create a Enzymotec ID. This will be your En[employer] login ID and cannot be changed, so think of one that is secure and easy to remember. 6. Create a En[employer]otec password. You can change your password at any time. 7. Enter your Password Reset Question and Answer. This can be used at a later time if you forget your password. 8. Enter your e-mail address. You will receive e-mail notification when new information is available in [address] 9. Click Sign Up. You can now view and download portions of your medical record. 10. Click the Download Summary menu link to download a portable copy of your medical information. If you have questions, please visit the Frequently Asked Questions section of the En[employer] website. Remember, [employer] is NOT to be used for urgent needs. For medical emergencies, dial 911. Now available from your iPhone and Android! Please provide this summary of care documentation to your next provider. Your primary care clinician is listed as Bhargavi Hunt. If you have any questions after today's visit, please call [phone].

## 2017-06-13 NOTE — PROGRESS NOTES
Spoke with patient and had already seen Dr Fiona Sharma pain management and they just recommended PT I told him to follow back up with them as you requested he need to see pain management he is an established patient no referral is needed just he needs to call to set up

## 2017-06-13 NOTE — PROGRESS NOTES
Chief Complaint   Patient presents with    Leg Pain     nerve damage left leg from prior sx     Health Maintenance Due   Topic Date Due    Pneumococcal 19-64 Medium Risk (1 of 1 - PPSV23) 05/18/1974 today     FOBT Q 1 YEAR AGE 50-75  05/18/2005 colonoscopy [place]    ZOSTER VACCINE AGE 60>  05/18/2015 will talk to pharmacy     Blood pressure 125/66, pulse 69, temperature 98.5 °F (36.9 °C), temperature source Oral, resp. rate 18, weight 187 lb 3.2 oz (84.9 kg).     Elizabeth Chicas, HANNAHN

## 2017-07-18 ENCOUNTER — OFFICE VISIT (OUTPATIENT)
Dept: FAMILY MEDICINE CLINIC | Age: 62
End: 2017-07-18

## 2017-07-18 VITALS
RESPIRATION RATE: 18 BRPM | TEMPERATURE: 96.9 F | BODY MASS INDEX: 28.88 KG/M2 | OXYGEN SATURATION: 98 % | HEART RATE: 70 BPM | HEIGHT: 67 IN | DIASTOLIC BLOOD PRESSURE: 71 MMHG | WEIGHT: 184 LBS | SYSTOLIC BLOOD PRESSURE: 134 MMHG

## 2017-07-18 DIAGNOSIS — G62.9 NEUROPATHY: Primary | ICD-10-CM

## 2017-07-18 DIAGNOSIS — E78.5 HYPERLIPIDEMIA, UNSPECIFIED HYPERLIPIDEMIA TYPE: ICD-10-CM

## 2017-07-18 DIAGNOSIS — I10 ESSENTIAL HYPERTENSION: ICD-10-CM

## 2017-07-18 RX ORDER — AMITRIPTYLINE HYDROCHLORIDE 50 MG/1
50 TABLET, FILM COATED ORAL
Qty: 30 TAB | Refills: 0 | Status: SHIPPED | OUTPATIENT
Start: 2017-07-18 | End: 2017-09-28

## 2017-07-18 RX ORDER — ATENOLOL 50 MG/1
TABLET ORAL
Qty: 90 TAB | Refills: 1 | Status: SHIPPED | OUTPATIENT
Start: 2017-07-18 | End: 2017-09-28 | Stop reason: SDUPTHER

## 2017-07-18 RX ORDER — GABAPENTIN 300 MG/1
300 CAPSULE ORAL 3 TIMES DAILY
Qty: 90 CAP | Refills: 0 | Status: SHIPPED | OUTPATIENT
Start: 2017-07-18 | End: 2017-09-28

## 2017-07-18 RX ORDER — ATORVASTATIN CALCIUM 80 MG/1
80 TABLET, FILM COATED ORAL DAILY
Qty: 90 TAB | Refills: 1 | Status: SHIPPED | OUTPATIENT
Start: 2017-07-18 | End: 2017-09-28 | Stop reason: SDUPTHER

## 2017-07-18 RX ORDER — LISINOPRIL 10 MG/1
10 TABLET ORAL DAILY
Qty: 90 TAB | Refills: 1 | Status: SHIPPED | OUTPATIENT
Start: 2017-07-18 | End: 2017-09-28 | Stop reason: SDUPTHER

## 2017-09-28 ENCOUNTER — OFFICE VISIT (OUTPATIENT)
Dept: FAMILY MEDICINE CLINIC | Age: 62
End: 2017-09-28

## 2017-09-28 VITALS
HEIGHT: 67 IN | OXYGEN SATURATION: 97 % | WEIGHT: 189.2 LBS | DIASTOLIC BLOOD PRESSURE: 90 MMHG | SYSTOLIC BLOOD PRESSURE: 154 MMHG | HEART RATE: 80 BPM | RESPIRATION RATE: 16 BRPM | TEMPERATURE: 97.8 F | BODY MASS INDEX: 29.7 KG/M2

## 2017-09-28 DIAGNOSIS — I10 ESSENTIAL HYPERTENSION: ICD-10-CM

## 2017-09-28 DIAGNOSIS — Z23 ENCOUNTER FOR IMMUNIZATION: ICD-10-CM

## 2017-09-28 DIAGNOSIS — E88.81 METABOLIC SYNDROME: ICD-10-CM

## 2017-09-28 DIAGNOSIS — E78.5 HYPERLIPIDEMIA, UNSPECIFIED HYPERLIPIDEMIA TYPE: Primary | ICD-10-CM

## 2017-09-28 RX ORDER — ATENOLOL 50 MG/1
TABLET ORAL
Qty: 90 TAB | Refills: 1 | Status: SHIPPED | OUTPATIENT
Start: 2017-09-28 | End: 2018-03-05 | Stop reason: SDUPTHER

## 2017-09-28 RX ORDER — LISINOPRIL 10 MG/1
10 TABLET ORAL DAILY
Qty: 90 TAB | Refills: 1 | Status: SHIPPED | OUTPATIENT
Start: 2017-09-28 | End: 2018-03-05 | Stop reason: SDUPTHER

## 2017-09-28 RX ORDER — ATORVASTATIN CALCIUM 80 MG/1
80 TABLET, FILM COATED ORAL DAILY
Qty: 90 TAB | Refills: 1 | Status: SHIPPED | OUTPATIENT
Start: 2017-09-28 | End: 2018-06-26 | Stop reason: SDUPTHER

## 2017-09-28 RX ORDER — GABAPENTIN 600 MG/1
600 TABLET ORAL 2 TIMES DAILY
COMMUNITY
Start: 2017-08-08 | End: 2017-11-24

## 2017-09-28 NOTE — PROGRESS NOTES
HISTORY OF PRESENT ILLNESS  Abdulaziz Yin is a 58 y.o. male. Chief Complaint   Patient presents with    Medication Refill       HPI  Hyperlipidemia  Not fasting today  Avoid red meats  Dropped a few lbs    Elevated Glucose  Not watching diet  But eating better    Still has trouble walking d/t muscle atrophy in left leg  May need disability tag renewed  Exercising    Saw Card Dr Concepcion Ku  And heart good    Has seen Ortho and Neuro in a mo  Takes Ibuprofen prn  Has not taken Percocet for a while    Review of Systems   Constitutional: Positive for weight loss (trying). Respiratory: Negative for cough and shortness of breath. Cardiovascular: Positive for leg swelling (left ankle). Negative for chest pain. Musculoskeletal: Positive for joint pain (left ankle). Neurological: Positive for focal weakness (left leg). Past Medical History:   Diagnosis Date    Bilateral hip pain     Colon polyps 2013    COPD (chronic obstructive pulmonary disease) ([facility] 04/2017    moderate    Diverticulitis     ED (erectile dysfunction)     Essential hypertension, benign 2006    Hypercholesterolemia     IGT (impaired glucose tolerance)     Metabolic syndrome     MI (myocardial infarction) ([facility] 11/10/2016    Mononeuropathy     femoral nerve left    PAD (peripheral artery disease) (HCC)     bilat    Paresthesia of left foot     Smoker      Current Outpatient Prescriptions   Medication Sig Dispense Refill    gabapentin (NEURONTIN) 600 mg tablet 600 mg two (2) times a day.  lisinopril (PRINIVIL, ZESTRIL) 10 mg tablet Take 1 Tab by mouth daily. 90 Tab 1    atorvastatin (LIPITOR) 80 mg tablet Take 1 Tab by mouth daily. 90 Tab 1    atenolol (TENORMIN) 50 mg tablet TAKE 1 TABLET BY MOUTH DAILY 90 Tab 1    aspirin delayed-release 81 mg tablet Take  by mouth daily.        Allergies   Allergen Reactions    Simvastatin Myalgia     Visit Vitals    /80 (BP 1 Location: Right arm, BP Patient Position: Sitting)  Pulse 80    Temp 97.8 °F (36.6 °C) (Oral)    Resp 16    Ht 5' 7\" (1.702 m)    Wt 189 lb 3.2 oz (85.8 kg)    SpO2 97%    BMI 29.63 kg/m2     Physical Exam   Constitutional: He is oriented to person, place, and time. He appears well-developed and well-nourished. No distress. HENT:   Head: Normocephalic and atraumatic. Eyes: Conjunctivae and EOM are normal.   Cardiovascular: Normal rate, regular rhythm and normal heart sounds. Pulmonary/Chest: Effort normal and breath sounds normal.   Musculoskeletal: He exhibits no edema. Lymphadenopathy:     He has no cervical adenopathy. Neurological: He is alert and oriented to person, place, and time. Skin: Skin is warm and dry. Psychiatric: He has a normal mood and affect. Nursing note and vitals reviewed. ASSESSMENT and PLAN    ICD-10-CM ICD-9-CM    1. Hyperlipidemia, unspecified hyperlipidemia type E78.5 272.4 atorvastatin (LIPITOR) 80 mg tablet      LIPID PANEL WITH LDL/HDL RATIO      METABOLIC PANEL, COMPREHENSIVE   2. Essential hypertension I10 401.9 lisinopril (PRINIVIL, ZESTRIL) 10 mg tablet      atenolol (TENORMIN) 50 mg tablet   3. Metabolic syndrome G80.77 737.0 HEMOGLOBIN A1C W/O EAG   4.  Encounter for immunization Z23 V03.89 PNEUMOCOCCAL POLYSACCHARIDE VACCINE, 23-VALENT, ADULT OR IMMUNOSUPPRESSED PT DOSE,   recheck BP in 2 w

## 2017-09-28 NOTE — MR AVS SNAPSHOT
Visit Information Date & Time Provider Department Dept. Phone Encounter #  
 9/28/2017  8:40 AM Patrizia Carlson  [facility] [phone] [id number] Follow-up Instructions Return in about 2 weeks (around 10/12/2017). Your Appointments 10/19/2017  7:20 AM  
ESTABLISHED PATIENT with Patrizia Carlson MD  
[facility] ([address]) Appt Note: 3 mo f/u $pb  
 [address]  
[phone]  
  
   
 [id number]  [address] Vazquez  
  
    
 10/31/2017 11:40 AM  
Follow Up with Raj Rose MD  
Neurology Clinic at [facility] [place] [address] [facility]) Appt Note: f/u nerve lesion,lsw,03/07/17; patient reschedule; f/u femoral nerve lesion,lsw,04/24/17  
 [address] [place] [place] [address], 
[address] [place] [address], [address] [address]  
[address] [facility], [address] [place] [address], [address] [facility] [address] [address] Upcoming Health Maintenance Date Due Pneumococcal 19-64 Medium Risk (1 of 1 - PPSV23) 5/18/1974 COLONOSCOPY 11/2/2021 DTaP/Tdap/Td series (2 - Td) 8/6/2025 Allergies as of 9/28/2017  Review Complete On: 7/18/2017 By: Jaqueline Gaytan LPN Severity Noted Reaction Type Reactions Simvastatin  05/17/2014    Myalgia Current Immunizations  Reviewed on 8/6/2015 Name Date Influenza Vaccine 9/30/2016 Pneumococcal Polysaccharide (PPSV-23)  Incomplete Tdap 8/6/2015 Not reviewed this visit You Were Diagnosed With   
  
 Codes Comments Hyperlipidemia, unspecified hyperlipidemia type    -  Primary ICD-10-CM: E78.5 ICD-9-CM: 272.4 Essential hypertension     ICD-10-CM: I10 
ICD-9-CM: 401.9 Metabolic syndrome     DRS-47-BG: K31.21 ICD-9-CM: 277.7 Encounter for immunization     ICD-10-CM: K65 ICD-9-CM: V03.89 Vitals BP Pulse Temp Resp Height(growth percentile) Weight(growth percentile) 150/80 (BP 1 Location: Right arm, BP Patient Position: Sitting) 80 97.8 °F (36.6 °C) (Oral) 16 5' 7\" (1.702 m) 189 lb 3.2 oz (85.8 kg) SpO2 BMI Smoking Status 97% 29.63 kg/m2 Former Smoker Vitals History BMI and BSA Data Body Mass Index Body Surface Area  
 29.63 kg/m 2 2.01 m 2 Preferred Pharmacy Pharmacy Name Phone THE [address], [address] [place] [address] Your Updated Medication List  
  
   
This list is accurate as of: 9/28/17  9:20 AM.  Always use your most recent med list.  
  
  
  
  
 aspirin delayed-release 81 mg tablet Take  by mouth daily. atenolol 50 mg tablet Commonly known as:  TENORMIN  
TAKE 1 TABLET BY MOUTH DAILY  
  
 atorvastatin 80 mg tablet Commonly known as:  LIPITOR Take 1 Tab by mouth daily. gabapentin 600 mg tablet Commonly known as:  NEURONTIN  
600 mg two (2) times a day. lisinopril 10 mg tablet Commonly known as:  Sriram Krishan Take 1 Tab by mouth daily. Prescriptions Sent to Pharmacy Refills  
 lisinopril (PRINIVIL, ZESTRIL) 10 mg tablet 1 Sig: Take 1 Tab by mouth daily. Class: Normal  
 Pharmacy: [facility] [place] [address] Ph #: [phone] Route: Oral  
 atorvastatin (LIPITOR) 80 mg tablet 1 Sig: Take 1 Tab by mouth daily. Class: Normal  
 Pharmacy: [facility] [place] [address] Ph #: [phone] Route: Oral  
 atenolol (TENORMIN) 50 mg tablet 1 Sig: TAKE 1 TABLET BY MOUTH DAILY Class: Normal  
 Pharmacy: [facility] [place] [address] Ph #: [phone] We Performed the Following HEMOGLOBIN A1C W/O EAG [86804 CPT(R)] LIPID PANEL WITH LDL/HDL RATIO [38489 CPT(R)] METABOLIC PANEL, COMPREHENSIVE [21958 CPT(R)] PNEUMOCOCCAL POLYSACCHARIDE VACCINE, 23-VALENT, ADULT OR IMMUNOSUPPRESSED PT DOSE, [99527 CPT(R)] Follow-up Instructions Return in about 2 weeks (around 10/12/2017). Introducing [facility]! [facility] introduces Flow Studio patient portal. Now you can access parts of your medical record, email your doctor's office, and request medication refills online. 1. In your internet browser, go to [email or web address] 2. Click on the First Time User? Click Here link in the Sign In box. You will see the New Member Sign Up page. 3. Enter your Flow Studio Access Code exactly as it appears below. You will not need to use this code after youve completed the sign-up process. If you do not sign up before the expiration date, you must request a new code. · Flow Studio Access Code: [email or web address] Expires: 12/27/2017  8:36 AM 
 
4. Enter the last four digits of your Social Security Number (xxxx) and Date of Birth (mm/dd/yyyy) as indicated and click Submit. You will be taken to the next sign-up page. 5. Create a Flow Studio ID. This will be your Flow Studio login ID and cannot be changed, so think of one that is secure and easy to remember. 6. Create a Flow Studio password. You can change your password at any time. 7. Enter your Password Reset Question and Answer. This can be used at a later time if you forget your password. 8. Enter your e-mail address. You will receive e-mail notification when new information is available in [address] 9. Click Sign Up. You can now view and download portions of your medical record. 10. Click the Download Summary menu link to download a portable copy of your medical information. If you have questions, please visit the Frequently Asked Questions section of the Flow Studio website. Remember, Flow Studio is NOT to be used for urgent needs. For medical emergencies, dial 911. Now available from your iPhone and Android! Please provide this summary of care documentation to your next provider. Your primary care clinician is listed as Bhargavi Hunt. If you have any questions after today's visit, please call [phone].

## 2017-09-29 LAB
ALBUMIN SERPL-MCNC: 4.1 G/DL (ref 3.6–4.8)
ALBUMIN/GLOB SERPL: 1.6 {RATIO} (ref 1.2–2.2)
ALP SERPL-CCNC: 106 IU/L (ref 39–117)
ALT SERPL-CCNC: 13 IU/L (ref 0–44)
AST SERPL-CCNC: 16 IU/L (ref 0–40)
BILIRUB SERPL-MCNC: 0.4 MG/DL (ref 0–1.2)
BUN SERPL-MCNC: 13 MG/DL (ref 8–27)
BUN/CREAT SERPL: 13 (ref 10–24)
CALCIUM SERPL-MCNC: 9.5 MG/DL (ref 8.6–10.2)
CHLORIDE SERPL-SCNC: 102 MMOL/L (ref 96–106)
CHOLEST SERPL-MCNC: 142 MG/DL (ref 100–199)
CO2 SERPL-SCNC: 26 MMOL/L (ref 18–29)
CREAT SERPL-MCNC: 1.02 MG/DL (ref 0.76–1.27)
GLOBULIN SER CALC-MCNC: 2.6 G/DL (ref 1.5–4.5)
GLUCOSE SERPL-MCNC: 108 MG/DL (ref 65–99)
HBA1C MFR BLD: 6.4 % (ref 4.8–5.6)
HDLC SERPL-MCNC: 37 MG/DL
INTERPRETATION, 910389: NORMAL
LDLC SERPL CALC-MCNC: 85 MG/DL (ref 0–99)
LDLC/HDLC SERPL: 2.3 RATIO UNITS (ref 0–3.6)
POTASSIUM SERPL-SCNC: 5.2 MMOL/L (ref 3.5–5.2)
PROT SERPL-MCNC: 6.7 G/DL (ref 6–8.5)
SODIUM SERPL-SCNC: 143 MMOL/L (ref 134–144)
TRIGL SERPL-MCNC: 101 MG/DL (ref 0–149)
VLDLC SERPL CALC-MCNC: 20 MG/DL (ref 5–40)

## 2017-09-29 NOTE — PROGRESS NOTES
Call pt, the HBA1C is 6.4 which is good  The Chol is good except the good Chol is low, increased exercises if poss  The kidney and liver tests are normal  Cont current meds and diet and recheck in 6 mo

## 2017-09-29 NOTE — PROGRESS NOTES
Patient has returned call, I have a advised him of his lab results per Dr Delbert Shafer review. Patient verbalizes understanding.   Estefani Wing RN

## 2017-10-12 ENCOUNTER — OFFICE VISIT (OUTPATIENT)
Dept: FAMILY MEDICINE CLINIC | Age: 62
End: 2017-10-12

## 2017-10-12 VITALS
BODY MASS INDEX: 29.51 KG/M2 | HEIGHT: 67 IN | DIASTOLIC BLOOD PRESSURE: 82 MMHG | RESPIRATION RATE: 18 BRPM | HEART RATE: 65 BPM | TEMPERATURE: 98.2 F | WEIGHT: 188 LBS | OXYGEN SATURATION: 95 % | SYSTOLIC BLOOD PRESSURE: 136 MMHG

## 2017-10-12 DIAGNOSIS — I10 ESSENTIAL HYPERTENSION: Primary | ICD-10-CM

## 2017-10-12 DIAGNOSIS — F32.A DEPRESSION, UNSPECIFIED DEPRESSION TYPE: ICD-10-CM

## 2017-10-12 RX ORDER — SERTRALINE HYDROCHLORIDE 50 MG/1
50 TABLET, FILM COATED ORAL DAILY
Qty: 30 TAB | Refills: 0 | Status: SHIPPED | OUTPATIENT
Start: 2017-10-12 | End: 2017-10-26 | Stop reason: SDUPTHER

## 2017-10-12 NOTE — MR AVS SNAPSHOT
Visit Information Date & Time Provider Department Dept. Phone Encounter #  
 10/12/2017  7:40 AM Екатерина De La Fuente MD [address] [place] [address] [phone] [id number] Your Appointments 10/31/2017 11:40 AM  
Follow Up with Kunal Valentin MD  
[facility] at [facility]) Appt Note: f/u nerve lesion,lsw,03/07/17; patient reschedule; f/u femoral nerve lesion,lsw,04/24/17  
 [address] [facility], 
[address] [facility], [address] [address]  
[address], [address] [facility], [address] [address] Upcoming Health Maintenance Date Due COLONOSCOPY 11/2/2021 DTaP/Tdap/Td series (2 - Td) 8/6/2025 Allergies as of 10/12/2017  Review Complete On: 10/12/2017 By: Felice Panchal LPN Severity Noted Reaction Type Reactions Simvastatin  05/17/2014    Myalgia Current Immunizations  Reviewed on 9/28/2017 Name Date Influenza Vaccine 9/30/2016 Pneumococcal Polysaccharide (PPSV-23) 9/28/2017 Tdap 8/6/2015 Not reviewed this visit You Were Diagnosed With   
  
 Codes Comments Essential hypertension    -  Primary ICD-10-CM: I10 
ICD-9-CM: 401.9 Depression, unspecified depression type     ICD-10-CM: F32.9 ICD-9-CM: 570 Vitals BP Pulse Temp Resp Height(growth percentile) Weight(growth percentile) 136/82 65 98.2 °F (36.8 °C) (Temporal) 18 5' 7\" (1.702 m) 188 lb (85.3 kg) SpO2 BMI Smoking Status 95% 29.44 kg/m2 Former Smoker Vitals History BMI and BSA Data Body Mass Index Body Surface Area  
 29.44 kg/m 2 2.01 m 2 Preferred Pharmacy Pharmacy Name Phone [facility] [address] [place], [address] Your Updated Medication List  
  
   
This list is accurate as of: 10/12/17  8:03 AM.  Always use your most recent med list.  
  
  
  
  
 aspirin delayed-release 81 mg tablet Take  by mouth daily. atenolol 50 mg tablet Commonly known as:  TENORMIN  
TAKE 1 TABLET BY MOUTH DAILY  
  
 atorvastatin 80 mg tablet Commonly known as:  LIPITOR Take 1 Tab by mouth daily. gabapentin 600 mg tablet Commonly known as:  NEURONTIN  
600 mg two (2) times a day. lisinopril 10 mg tablet Commonly known as:  [place] Escort Take 1 Tab by mouth daily. sertraline 50 mg tablet Commonly known as:  ZOLOFT Take 1 Tab by mouth daily. Prescriptions Sent to Pharmacy Refills  
 sertraline (ZOLOFT) 50 mg tablet 0 Sig: Take 1 Tab by mouth daily. Class: Normal  
 Pharmacy: [facility] [address] [place] [address] [address] Ph #: [phone] Route: Oral  
  
Introducing [facility]! Romayne Duster introduces Occasion patient portal. Now you can access parts of your medical record, email your doctor's office, and request medication refills online. 1. In your internet browser, go to [email or web address] 2. Click on the First Time User? Click Here link in the Sign In box. You will see the New Member Sign Up page. 3. Enter your Occasion Access Code exactly as it appears below. You will not need to use this code after youve completed the sign-up process. If you do not sign up before the expiration date, you must request a new code. · Occasion Access Code: [email or web address] Expires: 12/27/2017  8:36 AM 
 
4. Enter the last four digits of your Social Security Number (xxxx) and Date of Birth (mm/dd/yyyy) as indicated and click Submit. You will be taken to the next sign-up page. 5. Create a Occasion ID. This will be your Occasion login ID and cannot be changed, so think of one that is secure and easy to remember. 6. Create a Occasion password. You can change your password at any time. 7. Enter your Password Reset Question and Answer. This can be used at a later time if you forget your password. 8. Enter your e-mail address. You will receive e-mail notification when new information is available in [address] 9. Click Sign Up. You can now view and download portions of your medical record. 10. Click the Download Summary menu link to download a portable copy of your medical information. If you have questions, please visit the Frequently Asked Questions section of the Maintenance Assistant website. Remember, Maintenance Assistant is NOT to be used for urgent needs. For medical emergencies, dial 911. Now available from your iPhone and Android! Please provide this summary of care documentation to your next provider. Your primary care clinician is listed as Bhargavi Hunt. If you have any questions after today's visit, please call [phone].

## 2017-10-12 NOTE — PROGRESS NOTES
HISTORY OF PRESENT ILLNESS  Savannah Kumar is a 58 y.o. male. Chief Complaint   Patient presents with    Follow-up     HTN, pain, declined to answer depression re: \"harming yourself\"       HPI  HTN  on Lisinopril 10  Exercising a little more, walking    Got ankle brace  Gabapentin not helping a lot with left leg pain s/p surgery    Hard to live with the pain  Feels randa to be alive  Not sleeping  Feeling hopeless, depressed, not wanting to do things  Loss of interests  Lyrica too expensive    Review of Systems   Musculoskeletal:        Pain in leg     Psychiatric/Behavioral: Positive for depression. Negative for hallucinations and substance abuse. The patient has insomnia. The patient is not nervous/anxious. Past Medical History:   Diagnosis Date    Bilateral hip pain     Colon polyps 2013    COPD (chronic obstructive pulmonary disease) ([facility] [address] 04/2017    moderate    Diverticulitis     ED (erectile dysfunction)     Essential hypertension, benign 2006    Hypercholesterolemia     IGT (impaired glucose tolerance)     Metabolic syndrome     MI (myocardial infarction) 11/10/2016    Mononeuropathy     femoral nerve left    PAD (peripheral artery disease) (HCC)     bilat    Paresthesia of left foot     Smoker      Current Outpatient Prescriptions   Medication Sig Dispense Refill    sertraline (ZOLOFT) 50 mg tablet Take 1 Tab by mouth daily. 30 Tab 0    gabapentin (NEURONTIN) 600 mg tablet 600 mg two (2) times a day.  lisinopril (PRINIVIL, ZESTRIL) 10 mg tablet Take 1 Tab by mouth daily. 90 Tab 1    atorvastatin (LIPITOR) 80 mg tablet Take 1 Tab by mouth daily. 90 Tab 1    atenolol (TENORMIN) 50 mg tablet TAKE 1 TABLET BY MOUTH DAILY 90 Tab 1    aspirin delayed-release 81 mg tablet Take  by mouth daily.        Allergies   Allergen Reactions    Simvastatin Myalgia     Visit Vitals    /82    Pulse 65    Temp 98.2 °F (36.8 °C) (Temporal)    Resp 18    Ht 5' 7\" (1.702 m)    Wt 188 lb (85.3 kg)    SpO2 95%    BMI 29.44 kg/m2     Physical Exam   Constitutional: He is oriented to person, place, and time. He appears well-developed and well-nourished. No distress. HENT:   Head: Normocephalic and atraumatic. Eyes: Conjunctivae and EOM are normal.   Cardiovascular: Normal rate and regular rhythm. Pulmonary/Chest: Effort normal.   Musculoskeletal: He exhibits no edema. Neurological: He is alert and oriented to person, place, and time. Skin: Skin is warm and dry. Psychiatric: His behavior is normal. Judgment and thought content normal.   Nursing note and vitals reviewed. ASSESSMENT and PLAN    ICD-10-CM ICD-9-CM    1. Essential hypertension I10 401.9 Controlled, cont current BP med   2.  Depression, unspecified depression type F32.9 311 sertraline (ZOLOFT) 50 mg tablet started   recheck in 2 w  Counseled pt to relax into pain but formal Counseling suggested

## 2017-10-26 ENCOUNTER — OFFICE VISIT (OUTPATIENT)
Dept: FAMILY MEDICINE CLINIC | Age: 62
End: 2017-10-26

## 2017-10-26 VITALS
BODY MASS INDEX: 29.26 KG/M2 | HEART RATE: 59 BPM | OXYGEN SATURATION: 96 % | SYSTOLIC BLOOD PRESSURE: 140 MMHG | WEIGHT: 186.8 LBS | TEMPERATURE: 96.5 F | RESPIRATION RATE: 16 BRPM | DIASTOLIC BLOOD PRESSURE: 74 MMHG

## 2017-10-26 DIAGNOSIS — F32.A DEPRESSION, UNSPECIFIED DEPRESSION TYPE: Primary | ICD-10-CM

## 2017-10-26 DIAGNOSIS — K42.9 UMBILICAL HERNIA WITHOUT OBSTRUCTION AND WITHOUT GANGRENE: ICD-10-CM

## 2017-10-26 DIAGNOSIS — G47.00 INSOMNIA, UNSPECIFIED TYPE: ICD-10-CM

## 2017-10-26 RX ORDER — SERTRALINE HYDROCHLORIDE 50 MG/1
50 TABLET, FILM COATED ORAL DAILY
Qty: 30 TAB | Refills: 0 | Status: SHIPPED | OUTPATIENT
Start: 2017-10-26 | End: 2017-11-10 | Stop reason: SDUPTHER

## 2017-10-26 RX ORDER — TRAZODONE HYDROCHLORIDE 50 MG/1
50 TABLET ORAL
Qty: 30 TAB | Refills: 0 | Status: SHIPPED | OUTPATIENT
Start: 2017-10-26 | End: 2017-11-13 | Stop reason: SDUPTHER

## 2017-10-26 NOTE — PROGRESS NOTES
HISTORY OF PRESENT ILLNESS  Emerson Mathews is a 58 y.o. male. Chief Complaint   Patient presents with    Medication Evaluation     2 wk f/u sertraline       HPI  No SE with Zoloft, takes it in am  Not sleeping well  Has trouble falling asleep and waking up after 2-3 h and can't go back to sleep  Watching TV    Right groin swelling  Had CT scan but no hernia seen  Pt denies pain  But has mild discomfort and swelling in umbilicus      Review of Systems   HENT: Negative for congestion. Respiratory: Negative for cough. Cardiovascular: Negative for chest pain. Gastrointestinal: Negative for blood in stool. Genitourinary: Negative for hematuria. Musculoskeletal:        Left leg pain   Psychiatric/Behavioral: Positive for depression. Negative for suicidal ideas. The patient has insomnia. The patient is not nervous/anxious. Past Medical History:   Diagnosis Date    Bilateral hip pain     Colon polyps 2013    COPD (chronic obstructive pulmonary disease) ([facility] [address] 04/2017    moderate    Diverticulitis     ED (erectile dysfunction)     Essential hypertension, benign 2006    Hypercholesterolemia     IGT (impaired glucose tolerance)     Metabolic syndrome     MI (myocardial infarction) 11/10/2016    Mononeuropathy     femoral nerve left    [facility]per[facility]) ([facility])     bilat    Paresthesia of left foot     Smoker      Current Outpatient Prescriptions   Medication Sig Dispense Refill    traZODone (DESYREL) 50 mg tablet Take 1 Tab by mouth nightly. 30 Tab 0    sertraline (ZOLOFT) 50 mg tablet Take 1 Tab by mouth daily. 30 Tab 0    gabapentin (NEURONTIN) 600 mg tablet 600 mg two (2) times a day.  lisinopril (PRINIVIL, ZESTRIL) 10 mg tablet Take 1 Tab by mouth daily. 90 Tab 1    atorvastatin (LIPITOR) 80 mg tablet Take 1 Tab by mouth daily.  90 Tab 1    atenolol (TENORMIN) 50 mg tablet TAKE 1 TABLET BY MOUTH DAILY 90 Tab 1    aspirin delayed-release 81 mg tablet Take  by mouth daily. Allergies   Allergen Reactions    Simvastatin Myalgia     Visit Vitals    /74 (BP 1 Location: Left arm, BP Patient Position: Sitting)    Pulse (!) 59    Temp 96.5 °F (35.8 °C) (Oral)    Resp 16    Wt 186 lb 12.8 oz (84.7 kg)    SpO2 96%    BMI 29.26 kg/m2     Physical Exam   Constitutional: He is oriented to person, place, and time. He appears well-developed and well-nourished. No distress. HENT:   Head: Normocephalic and atraumatic. Eyes: Conjunctivae are normal.   Abdominal: Soft. He exhibits mass. There is no tenderness. Right groin swelling but no mass felt, but umbilical hernia present , reducible   Neurological: He is alert and oriented to person, place, and time. Skin: Skin is warm and dry. Psychiatric: He has a normal mood and affect. Nursing note and vitals reviewed. ASSESSMENT and PLAN    ICD-10-CM ICD-9-CM    1. Depression, unspecified depression type F32.9 311 sertraline (ZOLOFT) 50 mg tablet   2. Insomnia, unspecified type G47.00 780.52 traZODone (DESYREL) 50 mg tablet   3.  Umbilical hernia without obstruction and without gangrene K42.9 553.1 REFERRAL TO GENERAL SURGERY

## 2017-10-26 NOTE — PATIENT INSTRUCTIONS

## 2017-10-26 NOTE — PROGRESS NOTES
Chief Complaint   Patient presents with    Medication Evaluation     2 wk f/u sertraline     Visit Vitals    /74 (BP 1 Location: Left arm, BP Patient Position: Sitting)    Pulse (!) 59    Temp 96.5 °F (35.8 °C) (Oral)    Resp 16    Wt 186 lb 12.8 oz (84.7 kg)    SpO2 96%    BMI 29.26 kg/m2     Serge Avelar LPN

## 2017-10-31 ENCOUNTER — OFFICE VISIT (OUTPATIENT)
Dept: NEUROLOGY | Age: 62
End: 2017-10-31

## 2017-10-31 VITALS
HEART RATE: 63 BPM | WEIGHT: 187 LBS | SYSTOLIC BLOOD PRESSURE: 122 MMHG | OXYGEN SATURATION: 96 % | DIASTOLIC BLOOD PRESSURE: 74 MMHG | HEIGHT: 67 IN | BODY MASS INDEX: 29.35 KG/M2

## 2017-10-31 DIAGNOSIS — E78.5 HYPERLIPIDEMIA, UNSPECIFIED HYPERLIPIDEMIA TYPE: ICD-10-CM

## 2017-10-31 DIAGNOSIS — S74.12XD INJURY OF LEFT FEMORAL NERVE, SUBSEQUENT ENCOUNTER: ICD-10-CM

## 2017-10-31 DIAGNOSIS — R73.03 PREDIABETES: Primary | ICD-10-CM

## 2017-10-31 RX ORDER — CYCLOBENZAPRINE HCL 10 MG
10 TABLET ORAL 2 TIMES DAILY
Qty: 60 TAB | Refills: 3 | Status: SHIPPED | OUTPATIENT
Start: 2017-10-31 | End: 2018-03-05

## 2017-10-31 NOTE — PROGRESS NOTES
Chief Complaint: Left femoral nerve injury    Mr. Bhavesh Emerson comes back for a follow up visit he continues to have pain of the left lower leg with adductor weakness. Feels he has poor quality of life. The Neurontin only marginally improves pain. Conitnue to note atrophy of left leg. Assesment and Plan    1. Injury of left femoral nerve, subsequent encounter  Has worsening pain and weakness. Continue current medications. Trial of gralise. He has tried the neurontin without much improvement    2. Hypertension  Continue Zestril    3. Hyperlipidemia  Continue atorvastatin    4. Prediabetes  Diet controlled    5. Gait disturbance  Fall precautions continue cane use. 30 minutes were spent in face-to-face discussion and explanation of his condition. Discussed with wife and the patient's presence. Allergies  Simvastatin     Medications  Current Outpatient Prescriptions   Medication Sig    traZODone (DESYREL) 50 mg tablet Take 1 Tab by mouth nightly.  sertraline (ZOLOFT) 50 mg tablet Take 1 Tab by mouth daily.  gabapentin (NEURONTIN) 600 mg tablet 600 mg two (2) times a day.  lisinopril (PRINIVIL, ZESTRIL) 10 mg tablet Take 1 Tab by mouth daily.  atorvastatin (LIPITOR) 80 mg tablet Take 1 Tab by mouth daily.  atenolol (TENORMIN) 50 mg tablet TAKE 1 TABLET BY MOUTH DAILY    aspirin delayed-release 81 mg tablet Take  by mouth daily. No current facility-administered medications for this visit.          Medical History  Past Medical History:   Diagnosis Date    Bilateral hip pain     Colon polyps 2013    COPD (chronic obstructive pulmonary disease) ([facility] [address] 04/2017    moderate    Diverticulitis     ED (erectile dysfunction)     Essential hypertension, benign 2006    Hypercholesterolemia     IGT (impaired glucose tolerance)     Metabolic syndrome     MI (myocardial infarction) 11/10/2016    Mononeuropathy     femoral nerve left    PAD (peripheral artery disease) ([facility])     bilat    Paresthesia of left foot     Smoker      Review of Systems   Constitutional: Negative for chills and fever. HENT: Negative for ear pain. Eyes: Negative for pain and discharge. Respiratory: Negative for cough and hemoptysis. Cardiovascular: Negative for chest pain and claudication. Gastrointestinal: Negative for constipation and diarrhea. Genitourinary: Negative for flank pain and hematuria. Musculoskeletal: Positive for myalgias. Negative for back pain. Skin: Negative for itching and rash. Neurological: Positive for focal weakness. Negative for headaches. Endo/Heme/Allergies: Negative for environmental allergies. Does not bruise/bleed easily. Psychiatric/Behavioral: Negative for depression and hallucinations. Exam:    Visit Vitals    /74    Pulse 63    Ht 5' 7\" (1.702 m)    Wt 187 lb (84.8 kg)    SpO2 96%    BMI 29.29 kg/m2         General: Well developed, well nourished. Patient in no apparent distress   Head: Normocephalic, atraumatic, anicteric sclera   Neck Normal ROM, No thyromegally   Lungs:  Clear to auscultation bilaterally, No wheezes or rubs   Cardiac: Regular rate and rhythm with no murmurs. Abd: Bowel sounds were audible. No tenderness on palpation   Ext: No pedal edema   Skin: Supple no rash     NeurologicExam:  Mental Status: Alert and oriented to person place and time   Speech: Fluent no aphasia or dysarthria. Cranial Nerves:   II -XII intact   Motor:  Full and symmetric strength of upper and lower proximal and distal muscles. Normal bulk and tone. Left vastus medialis atrophy and weakness. Reflexes:   Deep tendon reflexes 2+/4 and symmetric. Sensory:   Symmetric and intact with no perceived deficits modalities involving small or large fibers with Dysthesia along the the anterior femoral cutaneous and saphenous nerve of the left leg   Gait:  Walks with lobo    Tremor:   No tremor noted. Cerebellar:  Coordination intact.     Neurovascular: No carotid bruits.  No JVD           Lab Review    Lab Results   Component Value Date/Time    WBC 7.7 01/20/2017 10:38 AM    HCT 44.2 01/20/2017 10:38 AM    HGB 14.4 01/20/2017 10:38 AM    PLATELET 383 77/63/8771 10:38 AM       Lab Results   Component Value Date/Time    Sodium 143 09/28/2017 09:10 AM    Potassium 5.2 09/28/2017 09:10 AM    Chloride 102 09/28/2017 09:10 AM    CO2 26 09/28/2017 09:10 AM    Glucose 108 09/28/2017 09:10 AM    BUN 13 09/28/2017 09:10 AM    Creatinine 1.02 09/28/2017 09:10 AM    Calcium 9.5 09/28/2017 09:10 AM         Lab Results   Component Value Date/Time    Hemoglobin A1c 6.4 09/28/2017 09:10 AM        Lab Results   Component Value Date/Time    Cholesterol, total 142 09/28/2017 09:10 AM    HDL Cholesterol 37 09/28/2017 09:10 AM    LDL, calculated 85 09/28/2017 09:10 AM    VLDL, calculated 20 09/28/2017 09:10 AM    Triglyceride 101 09/28/2017 09:10 AM

## 2017-10-31 NOTE — MR AVS SNAPSHOT
Visit Information Date & Time Provider Department Dept. Phone Encounter #  
 10/31/2017 11:40 AM Rigo Duran MD Neurology Clinic at [facility] [phone] [id number] Follow-up Instructions Return in about 3 months (around 1/31/2018) for femoral nerve injury. Your Appointments 11/24/2017  7:00 AM  
ESTABLISHED PATIENT with Javier Hwang MD  
[address] [place] [address] ([facility]) Appt Note: 1 mo f/u, $ cp [phone] [phone]  
[phone]  
  
   
 [address] [address] Upcoming Health Maintenance Date Due COLONOSCOPY 11/2/2021 DTaP/Tdap/Td series (2 - Td) 8/6/2025 Allergies as of 10/31/2017  Review Complete On: 10/31/2017 By: Rigo Duran MD  
  
 Severity Noted Reaction Type Reactions Simvastatin  05/17/2014    Myalgia Current Immunizations  Reviewed on 9/28/2017 Name Date Influenza Vaccine 9/30/2016 Pneumococcal Polysaccharide (PPSV-23) 9/28/2017 Tdap 8/6/2015 Not reviewed this visit You Were Diagnosed With   
  
 Codes Comments Prediabetes    -  Primary ICD-10-CM: R73.03 
ICD-9-CM: 790.29 Hyperlipidemia, unspecified hyperlipidemia type     ICD-10-CM: E78.5 ICD-9-CM: 272.4 Injury of left femoral nerve, subsequent encounter     ICD-10-CM: L23.42AY ICD-9-CM: V58.89, 956.1 Vitals BP Pulse Height(growth percentile) Weight(growth percentile) SpO2 BMI  
 122/74 63 5' 7\" (1.702 m) 187 lb (84.8 kg) 96% 29.29 kg/m2 Smoking Status Former Smoker BMI and BSA Data Body Mass Index Body Surface Area  
 29.29 kg/m 2 2 m 2 Preferred Pharmacy Pharmacy Name Phone [facility], [address] [place] [address] [address] Your Updated Medication List  
  
   
This list is accurate as of: 10/31/17 12:46 PM.  Always use your most recent med list.  
  
  
  
  
 aspirin delayed-release 81 mg tablet Take  by mouth daily. atenolol 50 mg tablet Commonly known as:  TENORMIN  
TAKE 1 TABLET BY MOUTH DAILY  
  
 atorvastatin 80 mg tablet Commonly known as:  LIPITOR Take 1 Tab by mouth daily. cyclobenzaprine 10 mg tablet Commonly known as:  FLEXERIL Take 1 Tab by mouth two (2) times a day.  
  
 gabapentin 600 mg tablet Commonly known as:  NEURONTIN  
600 mg two (2) times a day. lisinopril 10 mg tablet Commonly known as:  Zuleika Jadiel Take 1 Tab by mouth daily. sertraline 50 mg tablet Commonly known as:  ZOLOFT Take 1 Tab by mouth daily. traZODone 50 mg tablet Commonly known as:  Steve [place] English Take 1 Tab by mouth nightly. Prescriptions Sent to Pharmacy Refills  
 cyclobenzaprine (FLEXERIL) 10 mg tablet 3 Sig: Take 1 Tab by mouth two (2) times a day. Class: Normal  
 [facility]: [facility] [address] Ph #: [phone] Route: Oral  
  
Follow-up Instructions Return in about 3 months (around 1/31/2018) for femoral nerve injury. Patient Instructions PRESCRIPTION REFILL POLICY [facility] Statement to Patients April 1, 2014 In an effort to ensure the large volume of patient prescription refills is processed in the most efficient and expeditious manner, we are asking our patients to assist us by calling your Pharmacy for all prescription refills, this will include also your  Mail Order Pharmacy. The pharmacy will contact our office electronically to continue the refill process. Please do not wait until the last minute to call your pharmacy. We need at least 48 hours (2days) to fill prescriptions. We also encourage you to call your pharmacy before going to  your prescription to make sure it is ready.   
 
With regard to controlled substance prescription refill requests (narcotic refills) that need to be picked up at our office, we ask your cooperation by providing us with at least 72 hours (3days) notice that you will need a refill. We will not refill narcotic prescription refill requests after 4:00pm on any weekday, Monday through Thursday, or after 2:00pm on Fridays, or on the weekends. We encourage everyone to explore another way of getting your prescription refill request processed using [employer] our patient web portal through our electronic medical record system. [employer] is an efficient and effective way to communicate your medication request directly to the office and  downloadable as an samuel on your smart phone . [employer] also features a review functionality that allows you to view your medication list as well as leave messages for your physician. Are you ready to get connected? If so please review the attatched instructions or speak to any of our staff to get you set up right away! Thank you so much for your cooperation. Should you have any questions please contact our Practice Administrator. The Physicians and Staff,  [facility] A Healthy Lifestyle: Care Instructions Your Care Instructions A healthy lifestyle can help you feel good, stay at a healthy weight, and have plenty of energy for both work and play. A healthy lifestyle is something you can share with your whole family. A healthy lifestyle also can lower your risk for serious health problems, such as high blood pressure, heart disease, and diabetes. You can follow a few steps listed below to improve your health and the health of your family. Follow-up care is a key part of your treatment and safety. Be sure to make and go to all appointments, and call your doctor if you are having problems. It's also a good idea to know your test results and keep a list of the medicines you take. How can you care for yourself at home? · Do not eat too much sugar, fat, or fast foods. You can still have dessert and treats now and then. The goal is moderation. · Start small to improve your eating habits. Pay attention to portion sizes, drink less juice and soda pop, and eat more fruits and vegetables. ¨ Eat a healthy amount of food. A 3-ounce serving of meat, for example, is about the size of a deck of cards. Fill the rest of your plate with vegetables and whole grains. ¨ Limit the amount of soda and sports drinks you have every day. Drink more water when you are thirsty. ¨ Eat at least 5 servings of fruits and vegetables every day. It may seem like a lot, but it is not hard to reach this goal. A serving or helping is 1 piece of fruit, 1 cup of vegetables, or 2 cups of leafy, raw vegetables. Have an apple or some carrot sticks as an afternoon snack instead of a candy bar. Try to have fruits and/or vegetables at every meal. 
· Make exercise part of your daily routine. You may want to start with simple activities, such as walking, bicycling, or slow swimming. Try to be active 30 to 60 minutes every day. You do not need to do all 30 to 60 minutes all at once. For example, you can exercise 3 times a day for 10 or 20 minutes. Moderate exercise is safe for most people, but it is always a good idea to talk to your doctor before starting an exercise program. 
· Keep moving. Haily Boyd the lawn, work in the garden, or [place]. Take the stairs instead of the elevator at work. · If you smoke, quit. People who smoke have an increased risk for heart attack, stroke, cancer, and other lung illnesses. Quitting is hard, but there are ways to boost your chance of quitting tobacco for good. ¨ Use nicotine gum, patches, or lozenges. ¨ Ask your doctor about stop-smoking programs and medicines. ¨ Keep trying.  
In addition to reducing your risk of diseases in the future, you will notice some benefits soon after you stop using tobacco. If you have shortness of breath or asthma symptoms, they will likely get better within a few weeks after you quit. · Limit how much alcohol you drink. Moderate amounts of alcohol (up to 2 drinks a day for men, 1 drink a day for women) are okay. But drinking too much can lead to liver problems, high blood pressure, and other health problems. Family health If you have a family, there are many things you can do together to improve your health. · Eat meals together as a family as often as possible. · Eat healthy foods. This includes fruits, vegetables, lean meats and dairy, and whole grains. · Include your family in your fitness plan. Most people think of activities such as jogging or tennis as the way to fitness, but there are many ways you and your family can be more active. Anything that makes you breathe hard and gets your heart pumping is exercise. Here are some tips: 
¨ Walk to do errands or to take your child to school or the bus. ¨ Go for a family bike ride after dinner instead of watching TV. Where can you learn more? Go to http://addy-emma.info/. Enter P644 in the search box to learn more about \"A Healthy Lifestyle: Care Instructions. \" Current as of: May 12, 2017 Content Version: 11.4 © 0280-6433 Healthwise, Incorporated. Care instructions adapted under license by [employer] (which disclaims liability or warranty for this information). If you have questions about a medical condition or this instruction, always ask your healthcare professional. [place] [address] any warranty or liability for your use of this information. Introducing Prabhakar Odonnell! Rojelio Perez introduces [employer] patient portal. Now you can access parts of your medical record, email your doctor's office, and request medication refills online. 1. In your internet browser, go to https://[employer] 2. Click on the First Time User? Click Here link in the Sign In box. You will see the New Member Sign Up page. 3. Enter your [employer] Access Code exactly as it appears below. You will not need to use this code after youve completed the sign-up process. If you do not sign up before the expiration date, you must request a new code. · [employer] Access Code: [email or web address] Expires: 12/27/2017  8:36 AM 
 
4. Enter the last four digits of your Social Security Number (xxxx) and Date of Birth (mm/dd/yyyy) as indicated and click Submit. You will be taken to the next sign-up page. 5. Create a [employer] ID. This will be your [employer] login ID and cannot be changed, so think of one that is secure and easy to remember. 6. Create a [employer] password. You can change your password at any time. 7. Enter your Password Reset Question and Answer. This can be used at a later time if you forget your password. 8. Enter your e-mail address. You will receive e-mail notification when new information is available in [address] 9. Click Sign Up. You can now view and download portions of your medical record. 10. Click the Download Summary menu link to download a portable copy of your medical information. If you have questions, please visit the Frequently Asked Questions section of the [employer] website. Remember, [employer] is NOT to be used for urgent needs. For medical emergencies, dial 911. Now available from your iPhone and Android! Please provide this summary of care documentation to your next provider. Your primary care clinician is listed as Bhargavi Hunt. If you have any questions after today's visit, please call [phone].

## 2017-10-31 NOTE — PATIENT INSTRUCTIONS
[facility]   Statement to Patients  April 1, 2014      In an effort to ensure the large volume of patient prescription refills is processed in the most efficient and expeditious manner, we are asking our patients to assist us by calling your Pharmacy for all prescription refills, this will include also your  Mail Order Pharmacy. The pharmacy will contact our office electronically to continue the refill process. Please do not wait until the last minute to call your pharmacy. We need at least 48 hours (2days) to fill prescriptions. We also encourage you to call your pharmacy before going to  your prescription to make sure it is ready. With regard to controlled substance prescription refill requests (narcotic refills) that need to be picked up at our office, we ask your cooperation by providing us with at least 72 hours (3days) notice that you will need a refill. We will not refill narcotic prescription refill requests after 4:00pm on any weekday, Monday through Thursday, or after 2:00pm on Fridays, or on the weekends. We encourage everyone to explore another way of getting your prescription refill request processed using [employer], our patient web portal through our electronic medical record system. [employer] is an efficient and effective way to communicate your medication request directly to the office and  downloadable as an samuel on your smart phone . [employer] also features a review functionality that allows you to view your medication list as well as leave messages for your physician. Are you ready to get connected? If so please review the attatched instructions or speak to any of our staff to get you set up right away! Thank you so much for your cooperation. Should you have any questions please contact our Practice Administrator.     The Physicians and Staff,  [facility]          A Healthy Lifestyle: Care Instructions  Your Care Instructions    A healthy lifestyle can help you feel good, stay at a healthy weight, and have plenty of energy for both work and play. A healthy lifestyle is something you can share with your whole family. A healthy lifestyle also can lower your risk for serious health problems, such as high blood pressure, heart disease, and diabetes. You can follow a few steps listed below to improve your health and the health of your family. Follow-up care is a key part of your treatment and safety. Be sure to make and go to all appointments, and call your doctor if you are having problems. It's also a good idea to know your test results and keep a list of the medicines you take. How can you care for yourself at home? · Do not eat too much sugar, fat, or fast foods. You can still have dessert and treats now and then. The goal is moderation. · Start small to improve your eating habits. Pay attention to portion sizes, drink less juice and soda pop, and eat more fruits and vegetables. ¨ Eat a healthy amount of food. A 3-ounce serving of meat, for example, is about the size of a deck of cards. Fill the rest of your plate with vegetables and whole grains. ¨ Limit the amount of soda and sports drinks you have every day. Drink more water when you are thirsty. ¨ Eat at least 5 servings of fruits and vegetables every day. It may seem like a lot, but it is not hard to reach this goal. A serving or helping is 1 piece of fruit, 1 cup of vegetables, or 2 cups of leafy, raw vegetables. Have an apple or some carrot sticks as an afternoon snack instead of a candy bar. Try to have fruits and/or vegetables at every meal.  · Make exercise part of your daily routine. You may want to start with simple activities, such as walking, bicycling, or slow swimming. Try to be active 30 to 60 minutes every day. You do not need to do all 30 to 60 minutes all at once. For example, you can exercise 3 times a day for 10 or 20 minutes.  Moderate exercise is safe for most people, but it is always a good idea to talk to your doctor before starting an exercise program.  · Keep moving. Isabelle  the lawn, work in the garden, or [employer]. Take the stairs instead of the elevator at work. · If you smoke, quit. People who smoke have an increased risk for heart attack, stroke, cancer, and other lung illnesses. Quitting is hard, but there are ways to boost your chance of quitting tobacco for good. ¨ Use nicotine gum, patches, or lozenges. ¨ Ask your doctor about stop-smoking programs and medicines. ¨ Keep trying. In addition to reducing your risk of diseases in the future, you will notice some benefits soon after you stop using tobacco. If you have shortness of breath or asthma symptoms, they will likely get better within a few weeks after you quit. · Limit how much alcohol you drink. Moderate amounts of alcohol (up to 2 drinks a day for men, 1 drink a day for women) are okay. But drinking too much can lead to liver problems, high blood pressure, and other health problems. Family health  If you have a family, there are many things you can do together to improve your health. · Eat meals together as a family as often as possible. · Eat healthy foods. This includes fruits, vegetables, lean meats and dairy, and whole grains. · Include your family in your fitness plan. Most people think of activities such as jogging or tennis as the way to fitness, but there are many ways you and your family can be more active. Anything that makes you breathe hard and gets your heart pumping is exercise. Here are some tips:  ¨ Walk to do errands or to take your child to school or the bus. ¨ Go for a family bike ride after dinner instead of watching TV. Where can you learn more? Go to http://addy-emma.info/. Enter G021 in the search box to learn more about \"A Healthy Lifestyle: Care Instructions. \"  Current as of:  May 12, 2017  Content Version: 11.4  © 5818-0490 Healthwise, Incorporated. Care instructions adapted under license by [employer] (which disclaims liability or warranty for this information). If you have questions about a medical condition or this instruction, always ask your healthcare professional. [place] [address] any warranty or liability for your use of this information.

## 2017-11-10 DIAGNOSIS — F32.A DEPRESSION, UNSPECIFIED DEPRESSION TYPE: ICD-10-CM

## 2017-11-10 RX ORDER — SERTRALINE HYDROCHLORIDE 50 MG/1
50 TABLET, FILM COATED ORAL DAILY
Qty: 30 TAB | Refills: 0 | Status: SHIPPED | OUTPATIENT
Start: 2017-11-10 | End: 2017-11-24 | Stop reason: SDUPTHER

## 2017-11-10 RX ORDER — VARENICLINE TARTRATE 25 MG
KIT ORAL
Qty: 1 DOSE PACK | Refills: 0 | Status: SHIPPED | OUTPATIENT
Start: 2017-11-10 | End: 2018-03-05

## 2017-11-15 DIAGNOSIS — I10 ESSENTIAL HYPERTENSION: ICD-10-CM

## 2017-11-15 DIAGNOSIS — S74.11XD INJURY OF RIGHT FEMORAL NERVE, SUBSEQUENT ENCOUNTER: Primary | ICD-10-CM

## 2017-11-15 DIAGNOSIS — E78.5 HYPERLIPIDEMIA, UNSPECIFIED HYPERLIPIDEMIA TYPE: ICD-10-CM

## 2017-11-15 RX ORDER — GABAPENTIN 600 MG/1
3 TABLET, FILM COATED ORAL DAILY
Qty: 90 TAB | Refills: 3 | Status: SHIPPED | OUTPATIENT
Start: 2017-11-15 | End: 2018-03-05

## 2017-11-24 ENCOUNTER — OFFICE VISIT (OUTPATIENT)
Dept: FAMILY MEDICINE CLINIC | Age: 62
End: 2017-11-24

## 2017-11-24 VITALS
OXYGEN SATURATION: 96 % | HEIGHT: 67 IN | SYSTOLIC BLOOD PRESSURE: 113 MMHG | WEIGHT: 190.6 LBS | DIASTOLIC BLOOD PRESSURE: 70 MMHG | TEMPERATURE: 98.3 F | RESPIRATION RATE: 16 BRPM | BODY MASS INDEX: 29.91 KG/M2 | HEART RATE: 70 BPM

## 2017-11-24 DIAGNOSIS — G62.9 NEUROPATHY: ICD-10-CM

## 2017-11-24 DIAGNOSIS — K43.9 HERNIA OF ABDOMINAL WALL: ICD-10-CM

## 2017-11-24 DIAGNOSIS — F32.89 OTHER DEPRESSION: Primary | ICD-10-CM

## 2017-11-24 DIAGNOSIS — G47.09 OTHER INSOMNIA: ICD-10-CM

## 2017-11-24 DIAGNOSIS — Z87.891 CESSATION OF TOBACCO USE IN PREVIOUS 12 MONTHS: ICD-10-CM

## 2017-11-24 RX ORDER — OMEPRAZOLE 20 MG/1
20 CAPSULE, DELAYED RELEASE ORAL DAILY
COMMUNITY
End: 2018-12-11

## 2017-11-24 RX ORDER — SERTRALINE HYDROCHLORIDE 50 MG/1
50 TABLET, FILM COATED ORAL DAILY
Qty: 30 TAB | Refills: 3 | Status: SHIPPED | OUTPATIENT
Start: 2017-11-24 | End: 2018-01-08 | Stop reason: SDUPTHER

## 2017-11-24 RX ORDER — VARENICLINE TARTRATE 1 MG/1
1 TABLET, FILM COATED ORAL 2 TIMES DAILY
Qty: 60 TAB | Refills: 1 | Status: SHIPPED | OUTPATIENT
Start: 2017-11-24 | End: 2018-04-05

## 2017-11-24 RX ORDER — TRAZODONE HYDROCHLORIDE 100 MG/1
100 TABLET ORAL
Qty: 30 TAB | Refills: 1 | Status: SHIPPED | OUTPATIENT
Start: 2017-11-24 | End: 2018-07-30 | Stop reason: SDUPTHER

## 2017-11-24 NOTE — PROGRESS NOTES
HISTORY OF PRESENT ILLNESS  Deya Kaufman is a 58 y.o. male. Chief Complaint   Patient presents with    Depression     Depression, 1 mo follow up       HPI   Saw Neuro  Started on long active Gabapentin  And working much better now   Much less pain in left leg    Depression  On Zoloft 50  No SE  Better now  Needs refills    Insomnia  On Trazodone 50  Still not sleeping  Waking up every 2 h and can't go back to sleep  No SE with med    Worried about Hernia surgery on Dec 19    On Chantix  Quit smoking 1.5 w ago  Feeling much better  No cough    Review of Systems   HENT: Negative for congestion. Respiratory: Negative for cough. Cardiovascular: Negative for chest pain. Psychiatric/Behavioral: Negative for depression. The patient has insomnia. Past Medical History:   Diagnosis Date    Bilateral hip pain     Colon polyps 2013    COPD (chronic obstructive pulmonary disease) ([facility] [address] 04/2017    moderate    Diverticulitis     ED (erectile dysfunction)     Essential hypertension, benign 2006    Hernia of abdominal wall 2017    Hypercholesterolemia     IGT (impaired glucose tolerance)     Inguinal hernia 2017    (R)    Metabolic syndrome     MI (myocardial infarction) 11/10/2016    Mononeuropathy     femoral nerve left    PAD (peripheral artery disease) (HCC)     bilat    Paresthesia of left foot     Smoker      Current Outpatient Prescriptions   Medication Sig Dispense Refill    OTHER 2 Caps. Turmeric Curcumin Complex 500 mg      omeprazole (PRILOSEC) 20 mg capsule Take 20 mg by mouth daily.  sertraline (ZOLOFT) 50 mg tablet Take 1 Tab by mouth daily. 30 Tab 3    traZODone (DESYREL) 100 mg tablet Take 1 Tab by mouth nightly. 30 Tab 1    varenicline (CHANTIX) 1 mg tablet Take 1 Tab by mouth two (2) times a day. 60 Tab 1    GRALISE 600 mg Tb24 Take 3 Tabs by mouth daily.  Indications: NEUROPATHIC PAIN 90 Tab 3    varenicline (CHANTIX STARTER HUSSEIN) 0.5 mg (11)- 1 mg (42) DsPk Follow instructions 1 Dose Pack 0    cyclobenzaprine (FLEXERIL) 10 mg tablet Take 1 Tab by mouth two (2) times a day. 60 Tab 3    lisinopril (PRINIVIL, ZESTRIL) 10 mg tablet Take 1 Tab by mouth daily. 90 Tab 1    atorvastatin (LIPITOR) 80 mg tablet Take 1 Tab by mouth daily. 90 Tab 1    atenolol (TENORMIN) 50 mg tablet TAKE 1 TABLET BY MOUTH DAILY 90 Tab 1    aspirin delayed-release 81 mg tablet Take  by mouth daily. Allergies   Allergen Reactions    Simvastatin Myalgia     Visit Vitals    /70 (BP 1 Location: Right arm, BP Patient Position: Sitting)    Pulse 70    Temp 98.3 °F (36.8 °C) (Oral)    Resp 16    Ht 5' 7\" (1.702 m)    Wt 190 lb 9.6 oz (86.5 kg)    SpO2 96%    BMI 29.85 kg/m2     Physical Exam   Constitutional: He is oriented to person, place, and time. He appears well-developed and well-nourished. No distress. HENT:   Head: Normocephalic and atraumatic. Eyes: Conjunctivae and EOM are normal.   Pulmonary/Chest: Effort normal.   Neurological: He is alert and oriented to person, place, and time. Psychiatric: He has a normal mood and affect. Nursing note and vitals reviewed. ASSESSMENT and PLAN    ICD-10-CM ICD-9-CM    1. Other depression F32.89 311 sertraline (ZOLOFT) 50 mg tablet   2. Other insomnia G47.09 780.52 traZODone (DESYREL) 100 mg tablet   3. Neuropathy G62.9 355.9 Cont Gralise   4. Cessation of tobacco use in previous 12 months Z87.891 V15.82 Avoid restarting smoking  Cont Chantix   5.  Hernia of abdominal wall K43.9 553.20 F/U with surgeon

## 2017-11-24 NOTE — MR AVS SNAPSHOT
Visit Information Date & Time Provider Department Dept. Phone Encounter #  
 11/24/2017  7:00 AM Catrina Carpenter MD [address] [place] [address] [phone] [id number] Your Appointments 1/12/2018 11:00 AM  
Follow Up with Kristyn Smith MD  
[facility]) Appt Note: FU,adt,10/31/2017  
 [address], 
[id number], [address] [address]  
[address], [id number], [address] [address] Upcoming Health Maintenance Date Due COLONOSCOPY 11/2/2021 DTaP/Tdap/Td series (2 - Td) 8/6/2025 Allergies as of 11/24/2017  Review Complete On: 11/24/2017 By: Rae Sheffield, RN Severity Noted Reaction Type Reactions Simvastatin  05/17/2014    Myalgia Current Immunizations  Reviewed on 9/28/2017 Name Date Influenza Vaccine 9/30/2016 Pneumococcal Polysaccharide (PPSV-23) 9/28/2017 Tdap 8/6/2015 Not reviewed this visit You Were Diagnosed With   
  
 Codes Comments Other depression    -  Primary ICD-10-CM: F32.89 ICD-9-CM: 252 Other insomnia     ICD-10-CM: G47.09 
ICD-9-CM: 780.52 Neuropathy     ICD-10-CM: G62.9 ICD-9-CM: 355.9 Cessation of tobacco use in previous 12 months     ICD-10-CM: Z87.891 ICD-9-CM: V15.82 Hernia of abdominal wall     ICD-10-CM: K43.9 ICD-9-CM: 553.20 Vitals BP Pulse Temp Resp Height(growth percentile) Weight(growth percentile) 113/70 (BP 1 Location: Right arm, BP Patient Position: Sitting) 70 98.3 °F (36.8 °C) (Oral) 16 5' 7\" (1.702 m) 190 lb 9.6 oz (86.5 kg) SpO2 BMI Smoking Status 96% 29.85 kg/m2 Former Smoker BMI and BSA Data Body Mass Index Body Surface Area  
 29.85 kg/m 2 2.02 m 2 Preferred Pharmacy Pharmacy Name Phone [facility] [address], [address] [facility] Your Updated Medication List  
  
 This list is accurate as of: 11/24/17  7:38 AM.  Always use your most recent med list.  
  
  
  
  
 aspirin delayed-release 81 mg tablet Take  by mouth daily. atenolol 50 mg tablet Commonly known as:  TENORMIN  
TAKE 1 TABLET BY MOUTH DAILY  
  
 atorvastatin 80 mg tablet Commonly known as:  LIPITOR Take 1 Tab by mouth daily. cyclobenzaprine 10 mg tablet Commonly known as:  FLEXERIL Take 1 Tab by mouth two (2) times a day. GRALISE 600 mg Tb24 Generic drug:  gabapentin Take 3 Tabs by mouth daily. Indications: NEUROPATHIC PAIN  
  
 lisinopril 10 mg tablet Commonly known as:  Zuleika Jadiel Take 1 Tab by mouth daily. omeprazole 20 mg capsule Commonly known as:  PRILOSEC Take 20 mg by mouth daily. OTHER  
2 Caps. Turmeric Curcumin Complex 500 mg  
  
 sertraline 50 mg tablet Commonly known as:  ZOLOFT Take 1 Tab by mouth daily. traZODone 100 mg tablet Commonly known as:  Steve Racine Take 1 Tab by mouth nightly. varenicline 0.5 mg (11)- 1 mg (42) Dspk Commonly known as:  CHANTIX STARTER HUSSEIN Follow instructions Prescriptions Sent to Pharmacy Refills  
 sertraline (ZOLOFT) 50 mg tablet 3 Sig: Take 1 Tab by mouth daily. Class: Normal  
 [facility]: [facility] [address] Ph #: [phone] Route: Oral  
 traZODone (DESYREL) 100 mg tablet 1 Sig: Take 1 Tab by mouth nightly. Class: Normal  
 Pharmacy: [facility] [place] [facility] [address] Ph #: [phone] Route: Oral  
  
Introducing [facility] & HEALTH SERVICES! Rahel Mendez introduces [employer] patient portal. Now you can access parts of your medical record, email your doctor's office, and request medication refills online. 1. In your internet browser, go to [email or web address] 2. Click on the First Time User? Click Here link in the Sign In box. You will see the New Member Sign Up page. 3. Enter your [employer] Access Code exactly as it appears below. You will not need to use this code after youve completed the sign-up process. If you do not sign up before the expiration date, you must request a new code. · [employer] Access Code: [email or web address] Expires: 12/27/2017  7:36 AM 
 
4. Enter the last four digits of your Social Security Number (xxxx) and Date of Birth (mm/dd/yyyy) as indicated and click Submit. You will be taken to the next sign-up page. 5. Create a [employer] ID. This will be your [employer] login ID and cannot be changed, so think of one that is secure and easy to remember. 6. Create a [employer] password. You can change your password at any time. 7. Enter your Password Reset Question and Answer. This can be used at a later time if you forget your password. 8. Enter your e-mail address. You will receive e-mail notification when new information is available in [address] 9. Click Sign Up. You can now view and download portions of your medical record. 10. Click the Download Summary menu link to download a portable copy of your medical information. If you have questions, please visit the Frequently Asked Questions section of the [employer] website. Remember, [employer] is NOT to be used for urgent needs. For medical emergencies, dial 911. Now available from your iPhone and Android! Please provide this summary of care documentation to your next provider. Your primary care clinician is listed as Bhargavi Hunt. If you have any questions after today's visit, please call [phone].

## 2017-11-27 ENCOUNTER — DOCUMENTATION ONLY (OUTPATIENT)
Dept: NEUROLOGY | Age: 62
End: 2017-11-27

## 2017-12-13 ENCOUNTER — TELEPHONE (OUTPATIENT)
Dept: NEUROLOGY | Age: 62
End: 2017-12-13

## 2017-12-13 NOTE — TELEPHONE ENCOUNTER
Need clarification on Gralise dx. If it is for neuropathic pain, which Dx code should be attached to the PA request?     Sent to Dr. Josué Anderson for review and reply.

## 2017-12-18 NOTE — TELEPHONE ENCOUNTER
Araceli Right,    Can you give me the paperwork back on the gralise and I will send it to insurance today?

## 2017-12-19 NOTE — TELEPHONE ENCOUNTER
Spoke with Jatin Alvarez and told her no information was received and we released that was something that we discussed face to face for the patient

## 2018-01-10 ENCOUNTER — TELEPHONE (OUTPATIENT)
Dept: FAMILY MEDICINE CLINIC | Age: 63
End: 2018-01-10

## 2018-01-10 NOTE — TELEPHONE ENCOUNTER
Please call patient's wife, Bhupinder Gomez. Patient has been in the hospital since December 19th (was released January 4th). He is now experiencing anxiety issues and Bhupinder Gomez wants to talk to you about possibly getting him on some anxiety medication. Please call her at [phone].

## 2018-01-11 DIAGNOSIS — F41.9 ANXIETY: Primary | ICD-10-CM

## 2018-01-11 RX ORDER — HYDROXYZINE 25 MG/1
TABLET, FILM COATED ORAL
Qty: 45 TAB | Refills: 0 | Status: SHIPPED | OUTPATIENT
Start: 2018-01-11 | End: 2018-04-05

## 2018-01-11 NOTE — TELEPHONE ENCOUNTER
Spoke w/pt wife; she says home health nurse sees pt M,W,F; nurse suggested pt take PRN anxiety med for his anxiety attacks; pt taking sertraline; pt wife would like something called in for pt so she can  because pt can't make it to office for appt.  Please advise

## 2018-01-18 ENCOUNTER — TELEPHONE (OUTPATIENT)
Dept: NEUROLOGY | Age: 63
End: 2018-01-18

## 2018-01-18 NOTE — TELEPHONE ENCOUNTER
Re:   Gralise - Denied - Case    WJ:[id number];MXZAOOZ Name:[address] [place] and [facility];Status:Denied; Appeal Information:     Attention:GRIEVANCES [address]     [facility] [phone]; No other details provided for reason of denial. Reply was rec'd from [facility] NELIDA [employer]. Forward to Dr. Uma Ramsey for review.      Faxed denial as FYI to [facility] ph [phone]

## 2018-01-19 ENCOUNTER — TELEPHONE (OUTPATIENT)
Dept: NEUROLOGY | Age: 63
End: 2018-01-19

## 2018-03-05 ENCOUNTER — OFFICE VISIT (OUTPATIENT)
Dept: FAMILY MEDICINE CLINIC | Age: 63
End: 2018-03-05

## 2018-03-05 VITALS
BODY MASS INDEX: 25.58 KG/M2 | WEIGHT: 163 LBS | OXYGEN SATURATION: 100 % | SYSTOLIC BLOOD PRESSURE: 86 MMHG | RESPIRATION RATE: 18 BRPM | HEIGHT: 67 IN | TEMPERATURE: 97.4 F | HEART RATE: 84 BPM | DIASTOLIC BLOOD PRESSURE: 46 MMHG

## 2018-03-05 DIAGNOSIS — R11.10 VOMITING AND DIARRHEA: ICD-10-CM

## 2018-03-05 DIAGNOSIS — I10 ESSENTIAL HYPERTENSION: ICD-10-CM

## 2018-03-05 DIAGNOSIS — R10.31 RIGHT LOWER QUADRANT ABDOMINAL PAIN: Primary | ICD-10-CM

## 2018-03-05 DIAGNOSIS — I95.9 HYPOTENSION, UNSPECIFIED HYPOTENSION TYPE: ICD-10-CM

## 2018-03-05 DIAGNOSIS — R19.7 VOMITING AND DIARRHEA: ICD-10-CM

## 2018-03-05 RX ORDER — VALGANCICLOVIR 450 MG/1
450 TABLET, FILM COATED ORAL
COMMUNITY
Start: 2018-02-28 | End: 2018-04-05

## 2018-03-05 RX ORDER — ONDANSETRON 4 MG/1
4 TABLET, ORALLY DISINTEGRATING ORAL
COMMUNITY
Start: 2018-02-28 | End: 2018-03-30

## 2018-03-05 RX ORDER — UREA 10 %
1 LOTION (ML) TOPICAL
COMMUNITY
Start: 2018-02-28 | End: 2018-03-30

## 2018-03-05 RX ORDER — SIMETHICONE 80 MG
80 TABLET,CHEWABLE ORAL
COMMUNITY
Start: 2018-02-15 | End: 2018-06-19

## 2018-03-05 RX ORDER — ATENOLOL 50 MG/1
TABLET ORAL
Qty: 90 TAB | Refills: 1 | Status: SHIPPED | OUTPATIENT
Start: 2018-03-05 | End: 2018-04-05

## 2018-03-05 RX ORDER — LANOLIN ALCOHOL/MO/W.PET/CERES
325 CREAM (GRAM) TOPICAL
COMMUNITY
Start: 2018-02-16 | End: 2018-03-18

## 2018-03-05 RX ORDER — LISINOPRIL 10 MG/1
10 TABLET ORAL DAILY
Qty: 90 TAB | Refills: 1 | Status: SHIPPED | OUTPATIENT
Start: 2018-03-05 | End: 2018-04-05

## 2018-03-05 RX ORDER — LOPERAMIDE HYDROCHLORIDE 2 MG/1
2 CAPSULE ORAL
COMMUNITY
Start: 2018-02-15 | End: 2018-03-17

## 2018-03-05 RX ORDER — HYDROCODONE BITARTRATE AND ACETAMINOPHEN 5; 325 MG/1; MG/1
1 TABLET ORAL
COMMUNITY
Start: 2018-02-26 | End: 2018-03-26 | Stop reason: SDUPTHER

## 2018-03-05 NOTE — MR AVS SNAPSHOT
[facility] 6 
[phone] Patient: Dennise Guzman MRN: [id number] DON:[id number] Visit Information Date & Time Provider Department Dept. Phone Encounter #  
 3/5/2018 10:20 AM Delano Sosa MD [address] [id number] Upcoming Health Maintenance Date Due COLONOSCOPY 11/2/2021 DTaP/Tdap/Td series (2 - Td) 8/6/2025 Allergies as of 3/5/2018  Review Complete On: 3/5/2018 By: Sheng Leyva LPN Severity Noted Reaction Type Reactions Simvastatin  05/17/2014    Myalgia Current Immunizations  Reviewed on 9/28/2017 Name Date Influenza Vaccine 9/30/2016 Pneumococcal Polysaccharide (PPSV-23) 9/28/2017 Tdap 8/6/2015 Not reviewed this visit You Were Diagnosed With   
  
 Codes Comments Right lower quadrant abdominal pain    -  Primary ICD-10-CM: R10.31 ICD-9-CM: 789.03 Vomiting and diarrhea     ICD-10-CM: R11.10, R19.7 ICD-9-CM: 787.03, 787.91 Hypotension, unspecified hypotension type     ICD-10-CM: I95.9 ICD-9-CM: 458.9 Essential hypertension     ICD-10-CM: I10 
ICD-9-CM: 401.9 Vitals BP Pulse Temp Resp Height(growth percentile) Weight(growth percentile) (!) 86/46 84 97.4 °F (36.3 °C) (Temporal) 18 5' 7\" (1.702 m) 163 lb (73.9 kg) SpO2 BMI Smoking Status 100% 25.53 kg/m2 Former Smoker Vitals History BMI and BSA Data Body Mass Index Body Surface Area 25.53 kg/m 2 1.87 m 2 Preferred Pharmacy Pharmacy Name Phone [facility] [address], [address] [place] [address] Street [address] Your Updated Medication List  
  
   
This list is accurate as of 3/5/18 11:03 AM.  Always use your most recent med list.  
  
  
  
  
 aspirin delayed-release 81 mg tablet Take  by mouth daily. atenolol 50 mg tablet Commonly known as:  TENORMIN  
 TAKE 1 TABLET BY MOUTH DAILY  
  
 atorvastatin 80 mg tablet Commonly known as:  LIPITOR Take 1 Tab by mouth daily. ferrous sulfate 325 mg (65 mg iron) tablet Take 325 mg by mouth. FLORANEX 1 million cell Tab tablet Generic drug:  Lactobacillus Acidoph & Bulgar Take 1 Tab by mouth. HYDROcodone-acetaminophen 5-325 mg per tablet Commonly known as:  Jeovanny Harper Take 1 Tab by mouth. hydrOXYzine HCl 25 mg tablet Commonly known as:  ATARAX Take one tablet bid prn for anxiety  Indications: anxiety  
  
 lisinopril 10 mg tablet Commonly known as:  Rheta Valentina Take 1 Tab by mouth daily. loperamide 2 mg capsule Commonly known as:  IMODIUM Take 2 mg by mouth. omeprazole 20 mg capsule Commonly known as:  PRILOSEC Take 20 mg by mouth daily. ondansetron 4 mg disintegrating tablet Commonly known as:  ZOFRAN ODT Take 4 mg by mouth. sertraline 50 mg tablet Commonly known as:  ZOLOFT  
TAKE 1 TABLET BY MOUTH EVERY DAY  
  
 simethicone 80 mg chewable tablet Commonly known as:  Daniella Minion Take 80 mg by mouth. traZODone 100 mg tablet Commonly known as:  Bertha Bolognese Take 1 Tab by mouth nightly. valGANciclovir 450 mg tablet Commonly known as:  VALCYTE Take 450 mg by mouth. varenicline 1 mg tablet Commonly known as:  Chika Vega Take 1 Tab by mouth two (2) times a day. Prescriptions Sent to Pharmacy Refills  
 lisinopril (PRINIVIL, ZESTRIL) 10 mg tablet 1 Sig: Take 1 Tab by mouth daily. Class: Normal  
 [facility] [address] &  Ph #: [phone] Route: Oral  
 atenolol (TENORMIN) 50 mg tablet 1 Sig: TAKE 1 TABLET BY MOUTH DAILY Class: Normal  
 Pharmacy: [facility] &  Ph #: [phone] Introducing [facility] & HEALTH SERVICES!    
 Napoleon Liz introduces [employer] patient portal. Now you can access parts of your medical record, email your doctor's office, and request medication refills online. 1. In your internet browser, go to [email or web address] 2. Click on the First Time User? Click Here link in the Sign In box. You will see the New Member Sign Up page. 3. Enter your [employer] Access Code exactly as it appears below. You will not need to use this code after youve completed the sign-up process. If you do not sign up before the expiration date, you must request a new code. · [employer] Access Code: [email or web address] Expires: 6/3/2018  9:58 AM 
 
4. Enter the last four digits of your Social Security Number (xxxx) and Date of Birth (mm/dd/yyyy) as indicated and click Submit. You will be taken to the next sign-up page. 5. Create a [employer] ID. This will be your [employer] login ID and cannot be changed, so think of one that is secure and easy to remember. 6. Create a [employer] password. You can change your password at any time. 7. Enter your Password Reset Question and Answer. This can be used at a later time if you forget your password. 8. Enter your e-mail address. You will receive e-mail notification when new information is available in [address] 9. Click Sign Up. You can now view and download portions of your medical record. 10. Click the Download Summary menu link to download a portable copy of your medical information. If you have questions, please visit the Frequently Asked Questions section of the [employer] website. Remember, [employer] is NOT to be used for urgent needs. For medical emergencies, dial 911. Now available from your iPhone and Android! Please provide this summary of care documentation to your next provider. Your primary care clinician is listed as Bhargavi Hunt. If you have any questions after today's visit, please call [phone].

## 2018-03-05 NOTE — PROGRESS NOTES
HISTORY OF PRESENT ILLNESS  Yinka Hitchcock is a 58 y.o. male. Chief Complaint   Patient presents with    Follow-up     [facility] hospital stay       HPI  Started with Hernia repair in 12/19th  Next day had perforated bowel  On 12/21 had Laparoscopy and cleaned out  Had wound vac and still has open wound in abdomen  Released 1/4/18  Then had [facility] care nurse for wound vac  On 1/24th BP was low 80/40  Went to ER in [place] for dehydration  Got fluids and legs and arms swollen  Had Cryptosporidium infection  Had Colonoscopy and some polyps removed  Found CMV infection  Got ABX and antiviral infection  Went to [facility] for 2 w  Released 3/1/18    Still has diarrhea, gas pains and nausea, abdominal pain  Zofran is helping some but still has vomiting too  BM's 6-8 times, green colored BM's  Still feeling bad at times    Still has hernia in right lowsince mesh was taken out when septic    Also had endoscopy that was normal  Had hiccups    Took BP meds this am, Lisinopril and Atenolol  Needs refills    Review of Systems   Constitutional: Positive for chills. Negative for fever. HENT: Negative for congestion. Respiratory: Negative for cough. Cardiovascular: Negative for chest pain, palpitations and leg swelling. Gastrointestinal: Positive for abdominal pain (right side), diarrhea, nausea and vomiting. Negative for blood in stool and melena. Genitourinary: Positive for frequency. Negative for dysuria, hematuria and urgency.      Past Medical History:   Diagnosis Date    Bilateral hip pain     Colon polyps 2013    COPD (chronic obstructive pulmonary disease) ([facility] [address] 04/2017    moderate    Diverticulitis     ED (erectile dysfunction)     Essential hypertension, benign 2006    Hernia of abdominal wall 2017    Hypercholesterolemia     IGT (impaired glucose tolerance)     Inguinal hernia 2017    (R)    Metabolic syndrome     MI (myocardial infarction) 11/10/2016    Mononeuropathy     femoral nerve left    PAD (peripheral artery disease) (HCC)     bilat    Paresthesia of left foot     Smoker      Current Outpatient Prescriptions   Medication Sig Dispense Refill    ferrous sulfate 325 mg (65 mg iron) tablet Take 325 mg by mouth.  HYDROcodone-acetaminophen (NORCO) 5-325 mg per tablet Take 1 Tab by mouth.  Lactobacillus Acidoph & Bulgar (FLORANEX) 1 million cell tab tablet Take 1 Tab by mouth.  loperamide (IMODIUM) 2 mg capsule Take 2 mg by mouth.  ondansetron (ZOFRAN ODT) 4 mg disintegrating tablet Take 4 mg by mouth.  simethicone (MYLICON) 80 mg chewable tablet Take 80 mg by mouth.  valGANciclovir (VALCYTE) 450 mg tablet Take 450 mg by mouth.  lisinopril (PRINIVIL, ZESTRIL) 10 mg tablet Take 1 Tab by mouth daily. 90 Tab 1    atenolol (TENORMIN) 50 mg tablet TAKE 1 TABLET BY MOUTH DAILY 90 Tab 1    hydrOXYzine HCl (ATARAX) 25 mg tablet Take one tablet bid prn for anxiety  Indications: anxiety 45 Tab 0    sertraline (ZOLOFT) 50 mg tablet TAKE 1 TABLET BY MOUTH EVERY DAY 30 Tab 3    omeprazole (PRILOSEC) 20 mg capsule Take 20 mg by mouth daily.  traZODone (DESYREL) 100 mg tablet Take 1 Tab by mouth nightly. 30 Tab 1    atorvastatin (LIPITOR) 80 mg tablet Take 1 Tab by mouth daily. 90 Tab 1    aspirin delayed-release 81 mg tablet Take  by mouth daily.  varenicline (CHANTIX) 1 mg tablet Take 1 Tab by mouth two (2) times a day. 60 Tab 1     Allergies   Allergen Reactions    Simvastatin Myalgia     Visit Vitals    BP (!) 86/46    Pulse 84    Temp 97.4 °F (36.3 °C) (Temporal)    Resp 18    Ht 5' 7\" (1.702 m)    Wt 163 lb (73.9 kg)    SpO2 100%    BMI 25.53 kg/m2     Physical Exam   Constitutional: He is oriented to person, place, and time. He appears well-developed and well-nourished. No distress. HENT:   Head: Normocephalic and atraumatic. Mouth/Throat: Oropharynx is clear and moist. No oropharyngeal exudate.    Eyes: Conjunctivae and EOM are normal. Pupils are equal, round, and reactive to light. Cardiovascular: Normal rate and regular rhythm. Pulmonary/Chest: Effort normal and breath sounds normal.   Abdominal: Soft. There is tenderness (right LQ). There is rebound and guarding. Musculoskeletal: He exhibits no edema. Lymphadenopathy:     He has no cervical adenopathy. Neurological: He is alert and oriented to person, place, and time. Skin: Skin is warm and dry. Psychiatric: He has a normal mood and affect. Nursing note and vitals reviewed. ASSESSMENT and PLAN    ICD-10-CM ICD-9-CM    1. Right lower quadrant abdominal pain R10.31 789.03    2. Vomiting and diarrhea R11.10 787.03     R19.7 787.91    3. Hypotension, unspecified hypotension type I95.9 458.9 Hold BP meds for now  Po fluids given   4.  Essential hypertension I10 401.9 lisinopril (PRINIVIL, ZESTRIL) 10 mg tablet      atenolol (TENORMIN) 50 mg tablet     ER in [place] called and MD notified re pt and pt sent by Ambulance for further eval and treatment

## 2018-03-23 ENCOUNTER — OFFICE VISIT (OUTPATIENT)
Dept: FAMILY MEDICINE CLINIC | Age: 63
End: 2018-03-23

## 2018-03-23 VITALS
HEART RATE: 82 BPM | WEIGHT: 158.4 LBS | DIASTOLIC BLOOD PRESSURE: 51 MMHG | TEMPERATURE: 98.1 F | SYSTOLIC BLOOD PRESSURE: 78 MMHG | HEIGHT: 67 IN | OXYGEN SATURATION: 99 % | RESPIRATION RATE: 22 BRPM | BODY MASS INDEX: 24.86 KG/M2

## 2018-03-23 DIAGNOSIS — I95.9 HYPOTENSION, UNSPECIFIED HYPOTENSION TYPE: ICD-10-CM

## 2018-03-23 DIAGNOSIS — Z98.890 S/P HERNIA SURGERY: ICD-10-CM

## 2018-03-23 DIAGNOSIS — Z09 HOSPITAL DISCHARGE FOLLOW-UP: ICD-10-CM

## 2018-03-23 DIAGNOSIS — R10.30 LOWER ABDOMINAL PAIN: Primary | ICD-10-CM

## 2018-03-23 DIAGNOSIS — Z87.19 S/P HERNIA SURGERY: ICD-10-CM

## 2018-03-23 LAB
BILIRUB UR QL STRIP: NEGATIVE
GLUCOSE UR-MCNC: NEGATIVE MG/DL
KETONES P FAST UR STRIP-MCNC: NEGATIVE MG/DL
PH UR STRIP: 8.5 [PH] (ref 4.6–8)
PROT UR QL STRIP: ABNORMAL
SP GR UR STRIP: 1.01 (ref 1–1.03)
UA UROBILINOGEN AMB POC: ABNORMAL (ref 0.2–1)
URINALYSIS CLARITY POC: ABNORMAL
URINALYSIS COLOR POC: ABNORMAL
URINE BLOOD POC: ABNORMAL
URINE LEUKOCYTES POC: NEGATIVE
URINE NITRITES POC: NEGATIVE

## 2018-03-23 RX ORDER — LOPERAMIDE HYDROCHLORIDE 2 MG/1
CAPSULE ORAL AS NEEDED
COMMUNITY
End: 2019-02-05

## 2018-03-23 RX ORDER — ERGOCALCIFEROL 1.25 MG/1
CAPSULE ORAL
Refills: 0 | COMMUNITY
Start: 2018-03-20 | End: 2018-05-03

## 2018-03-23 RX ORDER — METOPROLOL TARTRATE 25 MG/1
TABLET, FILM COATED ORAL
Refills: 0 | COMMUNITY
Start: 2018-03-17 | End: 2018-05-03

## 2018-03-23 RX ORDER — CYCLOBENZAPRINE HCL 10 MG
TABLET ORAL
COMMUNITY
End: 2018-10-30

## 2018-03-23 RX ORDER — CHLORPROMAZINE HYDROCHLORIDE 25 MG/1
TABLET, FILM COATED ORAL
Refills: 0 | COMMUNITY
Start: 2018-03-20 | End: 2018-05-03

## 2018-03-23 RX ORDER — PREDNISONE 10 MG/1
TABLET ORAL
Refills: 1 | COMMUNITY
Start: 2018-03-20 | End: 2018-10-30

## 2018-03-23 RX ORDER — LANOLIN ALCOHOL/MO/W.PET/CERES
CREAM (GRAM) TOPICAL
COMMUNITY
End: 2018-04-05

## 2018-03-23 RX ORDER — BACLOFEN 10 MG/1
TABLET ORAL
COMMUNITY
Start: 2018-02-15 | End: 2018-04-05

## 2018-03-23 RX ORDER — PROCHLORPERAZINE MALEATE 5 MG
TABLET ORAL
Refills: 0 | COMMUNITY
Start: 2018-03-17 | End: 2018-10-30

## 2018-03-23 NOTE — PROGRESS NOTES
Chief Complaint   Patient presents with   [facility] Follow Up     pt. hospitalized enteritis, IBS, hypotension     There are no preventive care reminders to display for this patient.   Visit Vitals    BP (!) 86/60 (BP 1 Location: Left arm, BP Patient Position: Sitting)    Pulse (!) 8    Temp 98.1 °F (36.7 °C) (Oral)    Resp 22    Ht 5' 7\" (1.702 m)    Wt 158 lb 6.4 oz (71.8 kg)    SpO2 99%    BMI 24.81 kg/m2     Dominga Mcfarlane LPN

## 2018-03-23 NOTE — PROGRESS NOTES
Rosalee Gaucher is a 58 y.o. male who presents to the office today with the following:  Chief Complaint   Patient presents with   [facility] Follow Up     pt. hospitalized enteritis, IBS, hypotension       HPI   Pt here for f/u after ongoing issues and multiple hospitalizations for a variety of reasons. PCP is Dr. Faviola Valentine who is currently out of the country. Per Dr. Faviola Valentine last note:  \" Started with Hernia repair in 12/19th. Next day had perforated bowel  On 12/21 had Laparoscopy and cleaned out. Had wound vac and still has open wound in abdomen  Released 1/4/18, [facility] care nurse for wound vac  On 1/24th BP was low 80/40  Went to ER in [place] for dehydration  Got fluids and legs and arms swollen  Had Cryptosporidium infection  Had Colonoscopy and some polyps removed  Found CMV infection  Got ABX and antiviral infection  Went to [facility] for 2 w  Released 3/1/18. .  ..still with hernia right low since mesh was taken out when septic  Also had endoscopy that was normal. Had hiccups\"    Presented to office 3/5/18 to see Dr. Faviola Valentine c/o ongoing diarrhea, gas pains, nausea, and abd pain. Was taking Zofran but still vomiting, BMs 6-8xs green color, still feeling bad. Had taken his BP meds that AM consisting of Lisinopril and Atenolol. Due to sxs and PE findings of RLQ pain/rebound/guarding and hypotension (BP of 86/46) was sent via Ambulance back to ER in [place]. Transferred to [facility] from ER. Admitted [facility] on 3/7 and d/c on 20th. Repeated colonoscopy and endoscopy  Biopsy showed Inflammatory bowel disease  Back home with [employer] once week since Tues (x 3 d ago). Still w/o blood in stool- dark purple. No urinary sxs except still with incontinence (cannot tell when he has to go). Stays stomach still feels full from yesterday, does not want to eat today. Nothing smells/tastes the same. Felt good yesterday, almost nl BM, plenty of energy, almost no pain.   No diarrhea \"just loose\" stools per pt. Occasional nausea w/o vomiting, relieved with Zofran. Then by 3AM severe new onset abd pain began, leg pain, and hip pain (says all has been XRd- some of this pain is chronic due to femoral nerve damage- has not been able to make back to see his PM physician due to all of these other complications/hospitalizations). \"I just want something for pain, I don't want to go to the hospital\"    Has had 4 glasses of OJ, glass of coffee, ensure this AM. Trying to avoid dehydration. Did take BP meds this AM, including one Lisinopril 10mg and half Metoprolol 25mg. Is out of pain medication. Since quit smoking BP has been \"pretty good\"   BP on 21st [employer] got 104/62, then 94/66 with home cuff, this /63. Is not sure why he is still on multiple BP lowering agents. Review of Systems   Constitutional: Positive for malaise/fatigue and weight loss. Negative for chills, diaphoresis and fever. HENT: Negative for congestion, sinus pain and sore throat. Respiratory: Negative for cough, shortness of breath and wheezing. Cardiovascular: Negative for chest pain. Gastrointestinal: Positive for abdominal pain, blood in stool and nausea. Negative for constipation, heartburn and vomiting. Genitourinary: Negative for dysuria, flank pain, frequency, hematuria and urgency. Musculoskeletal: Positive for joint pain. Negative for myalgias. Skin: Negative for rash. Neurological: Positive for dizziness and weakness. Negative for tingling, tremors, sensory change, speech change, focal weakness, seizures, loss of consciousness and headaches. See HPI.     Past Medical History:   Diagnosis Date    Bilateral hip pain     Colon polyps 2013    COPD (chronic obstructive pulmonary disease) ([facility] [address] 04/2017    moderate    Diverticulitis     ED (erectile dysfunction)     Enteritis 03/2018    Essential hypertension, benign 2006    Hernia of abdominal wall 2017    Hypercholesterolemia     IGT (impaired glucose tolerance)     Inguinal hernia 2017    (R)    Irritable bowel syndrome (IBS) 90/7025    Metabolic syndrome     MI (myocardial infarction) 11/10/2016    Mononeuropathy     femoral nerve left    PAD (peripheral artery disease) (HCC)     bilat    Paresthesia of left foot     Smoker        Past Surgical History:   Procedure Laterality Date    ENDOSCOPY, COLON, DIAGNOSTIC  2014    HX COLONOSCOPY      w\ polyps    HX CORONARY ARTERY BYPASS GRAFT      HX CORONARY ARTERY BYPASS GRAFT  11/21/2016    4 vessel    HX HERNIA REPAIR  53/5297    paraumbilical, 2 hernias    HX OTHER SURGICAL  09\20\2013    nl stress myoview    HX OTHER SURGICAL      Aortic abdominal bypass       Allergies   Allergen Reactions    Simvastatin Myalgia     Pt takes this, says he is not allergic       Current Outpatient Prescriptions   Medication Sig    chlorproMAZINE (THORAZINE) 25 mg tablet TAKE ONE TABLET BY MOUTH EVERY DAY AS NEEDED FOR INTRACTABLE HICCUPPS    VITAMIN D2 50,000 unit capsule TAKE ONE CAPSULE BY MOUTH EVERY 7 DAYS.  metoprolol tartrate (LOPRESSOR) 25 mg tablet TAKE ONE-HALF TABLET BY MOUTH EVERY 12 HOURS    predniSONE (DELTASONE) 10 mg tablet TAKE THREE TABLETS BY MOUTH EVERY DAY    prochlorperazine (COMPAZINE) 5 mg tablet TAKE ONE TABLET BY MOUTH THREE TIMES A DAY AS NEEDED FOR NAUSEA AND VOMITING    ferrous sulfate (IRON) 325 mg (65 mg iron) tablet Take  by mouth Daily (before breakfast).  loperamide (IMODIUM) 2 mg capsule Take  by mouth as needed for Diarrhea.  cyclobenzaprine (FLEXERIL) 10 mg tablet Take  by mouth three (3) times daily as needed for Muscle Spasm(s).  HYDROcodone-acetaminophen (NORCO) 5-325 mg per tablet Take 1 Tab by mouth.  Lactobacillus Acidoph & Bulgar (FLORANEX) 1 million cell tab tablet Take 1 Tab by mouth.  ondansetron (ZOFRAN ODT) 4 mg disintegrating tablet Take 4 mg by mouth.  simethicone (MYLICON) 80 mg chewable tablet Take 80 mg by mouth.     lisinopril (PRINIVIL, ZESTRIL) 10 mg tablet Take 1 Tab by mouth daily.  hydrOXYzine HCl (ATARAX) 25 mg tablet Take one tablet bid prn for anxiety  Indications: anxiety    sertraline (ZOLOFT) 50 mg tablet TAKE 1 TABLET BY MOUTH EVERY DAY    omeprazole (PRILOSEC) 20 mg capsule Take 20 mg by mouth daily.  traZODone (DESYREL) 100 mg tablet Take 1 Tab by mouth nightly.  varenicline (CHANTIX) 1 mg tablet Take 1 Tab by mouth two (2) times a day.  atorvastatin (LIPITOR) 80 mg tablet Take 1 Tab by mouth daily.  aspirin delayed-release 81 mg tablet Take  by mouth daily.  baclofen (LIORESAL) 10 mg tablet     valGANciclovir (VALCYTE) 450 mg tablet Take 450 mg by mouth.  atenolol (TENORMIN) 50 mg tablet TAKE 1 TABLET BY MOUTH DAILY     No current facility-administered medications for this visit. Social History     Social History    Marital status:      Spouse name: N/A    Number of children: N/A    Years of education: N/A     Social History Main Topics    Smoking status: Former Smoker     Years: 46.00     Types: Cigarettes    Smokeless tobacco: Former User     Quit date: 11/20/2016    Alcohol use No      Comment: rare    Drug use: No    Sexual activity: Yes     Partners: Female     Other Topics Concern    Blood Transfusions No    Sleep Concern Yes     Works swing shift    Exercise Yes    Seat Belt Yes    Self-Exams Yes     Social History Narrative       Family History   Problem Relation Age of Onset    Cancer Sister      cervical    Hypertension Brother          Physical Exam:  Visit Vitals    BP (!) 78/51 (BP 1 Location: Left arm, BP Patient Position: Sitting)    Pulse 82    Temp 98.1 °F (36.7 °C) (Oral)    Resp 22    Ht 5' 7\" (1.702 m)    Wt 158 lb 6.4 oz (71.8 kg)    SpO2 99%    BMI 24.81 kg/m2     Physical Exam   Constitutional: He is oriented to person, place, and time. Pt appears frail, pale, and lightheaded.  Wife hold him while sitting in chair, pt did walk in to office with assistance of wife and cane   HENT:   Head: Normocephalic and atraumatic. Eyes: Conjunctivae are normal.   Cardiovascular: Normal rate, regular rhythm and normal heart sounds. Pulmonary/Chest: Effort normal and breath sounds normal.   Abdominal: Bowel sounds are normal. There is tenderness. There is rebound. There is no rigidity and no guarding. Difficult assessment as pt is too lightheaded to stand to get up on table. Chair exam shows a somewhat distended (but pt notes is nl for him) abdomen with tenderness along lower half of abdomen and into bilat groin. He has a surgical wound with bandage in the center and large midline healing scar from prior hernia repair. Mostly soft with some slight firmness over RLQ. Neurological: He is alert and oriented to person, place, and time. Skin: Skin is warm and dry. Psychiatric: Mood and affect normal.       Assessment/Plan:    ICD-10-CM ICD-9-CM    1. Lower abdominal pain R10.30 789.09 AMB POC URINALYSIS DIP STICK MANUAL W/O MICRO   2. Hypotension, unspecified hypotension type I95.9 458.9    3. S/P hernia surgery Z98.890 V45.89     Z87.19     4. Hospital discharge follow-up Z09 V67.59      Results for orders placed or performed in visit on 03/23/18   AMB POC URINALYSIS DIP STICK MANUAL W/O MICRO   Result Value Ref Range    Color (UA POC) Brown     Clarity (UA POC) Slightly Cloudy     Glucose (UA POC) Negative Negative    Bilirubin (UA POC) Negative Negative    Ketones (UA POC) Negative Negative    Specific gravity (UA POC) 1.015 1.001 - 1.035    Blood (UA POC) 3+ Negative    pH (UA POC) 8.5 (A) 4.6 - 8.0    Protein (UA POC) 1+ Negative    Urobilinogen (UA POC) 1 mg/dL 0.2 - 1    Nitrites (UA POC) Negative Negative    Leukocyte esterase (UA POC) Negative Negative   Pt notes blood in urine is not new. Hypotensive and worsening in office, even after po fluids.   D/w pt and wife who agree with pt severe hypotension and new lower abdominal pain given complicated recent history he would best be managed in hospital.  Spoke with PA \"Minesh\" at [facility], agrees with transport. Hand off to EMS- [facility]. He will f/u with PCP Dr. Dennice Fleischer once discharged. Total time spent with the patient today was 60 minutes of which more than 50% was spent face to face with the patient.     Aure Pressley PA-C

## 2018-03-26 ENCOUNTER — TELEPHONE (OUTPATIENT)
Dept: FAMILY MEDICINE CLINIC | Age: 63
End: 2018-03-26

## 2018-03-26 DIAGNOSIS — G62.9 NEUROPATHY: Primary | ICD-10-CM

## 2018-03-26 DIAGNOSIS — L76.82 PAIN AT SURGICAL INCISION: ICD-10-CM

## 2018-03-26 RX ORDER — HYDROCODONE BITARTRATE AND ACETAMINOPHEN 5; 325 MG/1; MG/1
1 TABLET ORAL
Qty: 15 TAB | Refills: 0 | Status: SHIPPED | OUTPATIENT
Start: 2018-03-26 | End: 2018-05-03 | Stop reason: SDUPTHER

## 2018-03-26 NOTE — TELEPHONE ENCOUNTER
Patient still in a lot of pain since Friday's appt. Can Kim call him in something for the pain please.

## 2018-03-26 NOTE — TELEPHONE ENCOUNTER
Spoke with pt. Will come by tomorrow afternoon to . Counseled pt on potential medication AEs/interactions. Caution regarding sedation and safety. Has tolerated well in past, no issues with interactions or SEs. Understands this is temporary tx and must f/u with PCP or Neuro if continues to require pain medication.  reviewed.

## 2018-04-05 ENCOUNTER — OFFICE VISIT (OUTPATIENT)
Dept: FAMILY MEDICINE CLINIC | Age: 63
End: 2018-04-05

## 2018-04-05 VITALS
DIASTOLIC BLOOD PRESSURE: 69 MMHG | WEIGHT: 151 LBS | TEMPERATURE: 99.5 F | SYSTOLIC BLOOD PRESSURE: 114 MMHG | RESPIRATION RATE: 19 BRPM | OXYGEN SATURATION: 96 % | HEART RATE: 102 BPM | HEIGHT: 67 IN | BODY MASS INDEX: 23.7 KG/M2

## 2018-04-05 DIAGNOSIS — D64.9 ANEMIA, UNSPECIFIED TYPE: ICD-10-CM

## 2018-04-05 DIAGNOSIS — R31.9 HEMATURIA, UNSPECIFIED TYPE: ICD-10-CM

## 2018-04-05 DIAGNOSIS — R30.0 DYSURIA: ICD-10-CM

## 2018-04-05 DIAGNOSIS — K42.9 UMBILICAL HERNIA WITHOUT OBSTRUCTION AND WITHOUT GANGRENE: ICD-10-CM

## 2018-04-05 DIAGNOSIS — K59.1 FUNCTIONAL DIARRHEA: Primary | ICD-10-CM

## 2018-04-05 DIAGNOSIS — I10 ESSENTIAL HYPERTENSION: ICD-10-CM

## 2018-04-05 LAB
BILIRUB UR QL STRIP: NEGATIVE
GLUCOSE UR-MCNC: NEGATIVE MG/DL
KETONES P FAST UR STRIP-MCNC: NEGATIVE MG/DL
PH UR STRIP: 6 [PH] (ref 4.6–8)
PROT UR QL STRIP: NORMAL
SP GR UR STRIP: 1.01 (ref 1–1.03)
UA UROBILINOGEN AMB POC: NORMAL (ref 0.2–1)
URINALYSIS CLARITY POC: CLEAR
URINALYSIS COLOR POC: NORMAL
URINE BLOOD POC: NORMAL
URINE LEUKOCYTES POC: NEGATIVE
URINE NITRITES POC: NEGATIVE

## 2018-04-05 NOTE — MR AVS SNAPSHOT
[facility] [address] 
[phone] Patient: Oskar Chi MRN: [id number] VET:[birth year] Visit Information Date & Time Provider Department Dept. Phone Encounter #  
 4/5/2018  1:00 PM Leigh Lackey MD [address] [place] [address] [phone] [id number] Upcoming Health Maintenance Date Due DTaP/Tdap/Td series (2 - Td) 8/6/2025 COLONOSCOPY 1/31/2028 Allergies as of 4/5/2018  Review Complete On: 4/5/2018 By: Amber Ospina LPN Severity Noted Reaction Type Reactions Simvastatin  05/17/2014    Myalgia Pt takes this, says he is not allergic Current Immunizations  Reviewed on 9/28/2017 Name Date Influenza Vaccine 9/30/2016 Pneumococcal Polysaccharide (PPSV-23) 9/28/2017 Tdap 8/6/2015 Not reviewed this visit You Were Diagnosed With   
  
 Codes Comments Functional diarrhea    -  Primary ICD-10-CM: K59.1 ICD-9-CM: 564.5 Dysuria     ICD-10-CM: R30.0 ICD-9-CM: 788.1 Essential hypertension     ICD-10-CM: I10 
ICD-9-CM: 401.9 Anemia, unspecified type     ICD-10-CM: D64.9 ICD-9-CM: 285.9 Hematuria, unspecified type     ICD-10-CM: R31.9 ICD-9-CM: 599.70 Vitals BP Pulse Temp Resp Height(growth percentile) Weight(growth percentile) 114/69 (BP 1 Location: Left arm, BP Patient Position: Sitting) (!) 102 99.5 °F (37.5 °C) (Oral) 19 5' 7\" (1.702 m) 151 lb (68.5 kg) SpO2 BMI Smoking Status 96% 23.65 kg/m2 Former Smoker Vitals History BMI and BSA Data Body Mass Index Body Surface Area  
 23.65 kg/m 2 1.8 m 2 Preferred Pharmacy Pharmacy Name Phone [facility] [address] [facility], [address] Your Updated Medication List  
  
   
This list is accurate as of 4/5/18  2:08 PM.  Always use your most recent med list.  
  
  
  
  
 aspirin delayed-release 81 mg tablet Take  by mouth daily. atorvastatin 80 mg tablet Commonly known as:  LIPITOR Take 1 Tab by mouth daily. chlorproMAZINE 25 mg tablet Commonly known as:  THORAZINE  
TAKE ONE TABLET BY MOUTH EVERY DAY AS NEEDED FOR INTRACTABLE HICCUPPS  
  
 cyclobenzaprine 10 mg tablet Commonly known as:  FLEXERIL Take  by mouth three (3) times daily as needed for Muscle Spasm(s). HYDROcodone-acetaminophen 5-325 mg per tablet Commonly known as:  Marvelyn Code Take 1 Tab by mouth every six (6) hours as needed for Pain. Max Daily Amount: 4 Tabs. loperamide 2 mg capsule Commonly known as:  IMODIUM Take  by mouth as needed for Diarrhea.  
  
 metoprolol tartrate 25 mg tablet Commonly known as:  LOPRESSOR  
TAKE ONE-HALF TABLET BY MOUTH EVERY 12 HOURS  
  
 omeprazole 20 mg capsule Commonly known as:  PRILOSEC Take 20 mg by mouth daily. predniSONE 10 mg tablet Commonly known as:  DELTASONE  
two TABLETS BY MOUTH EVERY DAY  
  
 PROBIOTIC 4X 10-15 mg Tbec Generic drug:  B.infantis-B.ani-B.long-B.bifi Take  by mouth.  
  
 prochlorperazine 5 mg tablet Commonly known as:  COMPAZINE  
TAKE ONE TABLET BY MOUTH THREE TIMES A DAY AS NEEDED FOR NAUSEA AND VOMITING  
  
 sertraline 50 mg tablet Commonly known as:  ZOLOFT  
TAKE 1 TABLET BY MOUTH EVERY DAY  
  
 simethicone 80 mg chewable tablet Commonly known as:  Charity Barefoot Take 80 mg by mouth. traZODone 100 mg tablet Commonly known as:   Gates Take 1 Tab by mouth nightly. VITAMIN D2 50,000 unit capsule Generic drug:  ergocalciferol TAKE ONE CAPSULE BY MOUTH EVERY 7 DAYS. We Performed the Following AMB POC URINALYSIS DIP STICK MANUAL W/O MICRO [93271 CPT(R)] CBC WITH AUTOMATED DIFF [73736 CPT(R)] COLLECTION VENOUS BLOOD,VENIPUNCTURE L1985419 CPT(R)] IRON PROFILE [id number] CPT(R)] METABOLIC PANEL, COMPREHENSIVE [64502 CPT(R)] WA HANDLG&/OR CONVEY OF SPEC FOR TR OFFICE TO LAB [15768 CPT(R)] Introducing [facility]! [employer] introduces [employer] patient portal. Now you can access parts of your medical record, email your doctor's office, and request medication refills online. 1. In your internet browser, go to [email or web address] 2. Click on the First Time User? Click Here link in the Sign In box. You will see the New Member Sign Up page. 3. Enter your [employer] Access Code exactly as it appears below. You will not need to use this code after youve completed the sign-up process. If you do not sign up before the expiration date, you must request a new code. · [employer] Access Code: [email or web address] Expires: 6/3/2018 10:58 AM 
 
4. Enter the last four digits of your Social Security Number (xxxx) and Date of Birth (mm/dd/yyyy) as indicated and click Submit. You will be taken to the next sign-up page. 5. Create a [employer] ID. This will be your [employer] login ID and cannot be changed, so think of one that is secure and easy to remember. 6. Create a [employer] password. You can change your password at any time. 7. Enter your Password Reset Question and Answer. This can be used at a later time if you forget your password. 8. Enter your e-mail address. You will receive e-mail notification when new information is available in [address] 9. Click Sign Up. You can now view and download portions of your medical record. 10. Click the Download Summary menu link to download a portable copy of your medical information. If you have questions, please visit the Frequently Asked Questions section of the [employer] website. Remember, [employer] is NOT to be used for urgent needs. For medical emergencies, dial 911. Now available from your iPhone and Android! Please provide this summary of care documentation to your next provider. Your primary care clinician is listed as Bhargavi Hunt.  If you have any questions after today's visit, please call [phone].

## 2018-04-05 NOTE — PROGRESS NOTES
Casi Wild  Identified pt with two pt identifiers(name and ). Chief Complaint   Patient presents with   [facility] Follow Up     hospital stay last week & lost 50 lbs since 2017       1. Have you been to the ER, urgent care clinic since your last visit? Hospitalized since your last visit? NO    2. Have you seen or consulted any other health care providers outside of the [address] [place] [address] since your last visit? Include any pap smears or colon screening. NO      Dr Cornelius Brown notified of reason for visit, vitals and flowsheets obtained on patients. Patient received paperwork for advance directive during previous visit but has not completed at this time     Reviewed record In preparation for visit, huddled with provider and have obtained necessary documentation      There are no preventive care reminders to display for this patient.     Wt Readings from Last 3 Encounters:   18 151 lb (68.5 kg)   18 158 lb 6.4 oz (71.8 kg)   18 163 lb (73.9 kg)     Temp Readings from Last 3 Encounters:   18 99.5 °F (37.5 °C) (Oral)   18 98.1 °F (36.7 °C) (Oral)   18 97.4 °F (36.3 °C) (Temporal)     BP Readings from Last 3 Encounters:   18 114/69   18 (!) 78/51   18 (!) 86/46     Pulse Readings from Last 3 Encounters:   18 (!) 102   18 82   18 84     Vitals:    18 1253   BP: 114/69   Pulse: (!) 102   Resp: 19   Temp: 99.5 °F (37.5 °C)   TempSrc: Oral   SpO2: 96%   Weight: 151 lb (68.5 kg)   Height: 5' 7\" (1.702 m)   PainSc:   8   PainLoc: Leg         Learning Assessment:  :     Learning Assessment 10/31/2017 2017 3/7/2017 2017 2017 2016 2016   PRIMARY LEARNER Patient Patient Patient Patient Patient Patient Patient   HIGHEST LEVEL OF EDUCATION - PRIMARY LEARNER  - - - - - - -   PRIMARY LANGUAGE ENGLISH ENGLISH ENGLISH ENGLISH ENGLISH ENGLISH ENGLISH   LEARNER PREFERENCE PRIMARY DEMONSTRATION DEMONSTRATION DEMONSTRATION DEMONSTRATION DEMONSTRATION DEMONSTRATION LISTENING   ANSWERED BY patient patient patient patient patient patient Patient   RELATIONSHIP SELF SELF SELF SELF - SELF SELF       Depression Screening:  :     PHQ over the last two weeks 10/12/2017   Little interest or pleasure in doing things Nearly every day   Feeling down, depressed or hopeless Nearly every day   Total Score PHQ 2 6   Trouble falling or staying asleep, or sleeping too much Nearly every day   Feeling tired or having little energy Several days   Poor appetite or overeating Nearly every day   Feeling bad about yourself - or that you are a failure or have let yourself or your family down Nearly every day   Trouble concentrating on things such as school, work, reading or watching TV Several days   Moving or speaking so slowly that other people could have noticed; or the opposite being so fidgety that others notice Nearly every day   How difficult have these problems made it for you to do your work, take care of your home and get along with others Somewhat difficult       Fall Risk Assessment:  :     Fall Risk Assessment, last 12 mths 7/22/2016   Able to walk? Yes   Fall in past 12 months? No       Abuse Screening:  :     Abuse Screening Questionnaire 7/22/2016   Do you ever feel afraid of your partner? N   Are you in a relationship with someone who physically or mentally threatens you? N   Is it safe for you to go home?  Y       ADL Screening:  :     ADL Assessment 9/30/2016   Feeding yourself No Help Needed   Getting from bed to chair No Help Needed   Getting dressed No Help Needed   Bathing or showering No Help Needed   Walk across the room (includes cane/walker) No Help Needed   Using the telphone No Help Needed   Taking your medications No Help Needed   Preparing meals No Help Needed   Managing money (expenses/bills) No Help Needed   Moderately strenuous housework (laundry) No Help Needed   Shopping for personal items (toiletries/medicines) No Help Needed   Shopping for groceries No Help Needed   Driving No Help Needed   Climbing a flight of stairs No Help Needed   Getting to places beyond walking distances No Help Needed               Patient is accompanied by wife I have received verbal consent from Daniela Joseph to discuss any/all medical information while they are present in the room. Medication reconciliation up to date and corrected with patient at this time.

## 2018-04-05 NOTE — PROGRESS NOTES
HISTORY OF PRESENT ILLNESS  Brea Chew is a 58 y.o. male. Chief Complaint   Patient presents with   [facility] Follow Up     hospital stay last week & lost 50 lbs since December 2017       HPI    Started with Hernia repair in 12/19th  Next day had perforated bowel  On 12/21 had Laparoscopy and had abdominal cavity cleaned out  Had wound vac and still has open wound in abdomen  Released 1/4/18  Then had Adrian Machado care nurse for wound vac  On 1/24th BP was low 80/40  Went to ER in [place] for dehydration  Got fluids and legs and arms were swollen  Had Cryptosporidium infection  Had Colonoscopy and some polyps removed  Found CMV infection  Got ABX and antiviral infection  Went to [facility] for 2 w  Released 3/1/18  When here 2 visits ago still had fever and low BP   Was sent to ER in [place]  Was hospitalized 2 d then sent to [facility]  Had IBS based on Bx from Colonoscopy  Was discont on meds for CMV and Cryptosporidium  Started on Prednisone the day before being discharged on 3/20/18  Went home with [place] and PT  Still had diarrhea     Saw Dillon Juares for sim sy again last visit and sent back to ER in [address],  fluids for dehydration and had nl CT except for inflammation and Hiatal hernia  Sent home with no change of meds    Still not doing good  Can not eat much  Still has diarrhea  Saw Dr Jeremías Loaiza and Gastroenterologist at [facility] Dr Araclei Coto  Has F/U apt on 4/16    Has loose stools still about 5-8 times unless taking Imodium  Lost weight    Taking ensure now  Often too weak for PT    Review of Systems   Constitutional: Negative for fever. HENT: Negative for congestion. Respiratory: Positive for shortness of breath (when heart racing). Negative for cough. Cardiovascular: Negative for chest pain and palpitations. Gastrointestinal: Positive for abdominal pain (lower abdomen 4/10 since surgery and right hernia), blood in stool (still a little occ) and diarrhea. Negative for heartburn, melena, nausea and vomiting. Genitourinary: Positive for dysuria (little) and urgency. Negative for frequency. Musculoskeletal: Negative for myalgias. Left leg pain   Neurological: Positive for dizziness (when BP low) and weakness. Negative for headaches. Psychiatric/Behavioral: The patient is nervous/anxious. Past Medical History:   Diagnosis Date    Bilateral hip pain     Colon polyps 2013    COPD (chronic obstructive pulmonary disease) ([facility] 04/2017    moderate    Diverticulitis     ED (erectile dysfunction)     Enteritis 03/2018    Essential hypertension, benign 2006    Hernia of abdominal wall 2017    Hypercholesterolemia     IGT (impaired glucose tolerance)     Inguinal hernia 2017    (R)    Irritable bowel syndrome (IBS) 41/6365    Metabolic syndrome     MI (myocardial infarction) ([facility] 11/10/2016    Mononeuropathy     femoral nerve left    PAD (peripheral artery disease) (HCC)     bilat    Paresthesia of left foot     Smoker      Current Outpatient Prescriptions   Medication Sig Dispense Refill    B.infantis-B.ani-B.long-B.bifi (PROBIOTIC 4X) 10-15 mg TbEC Take  by mouth.  HYDROcodone-acetaminophen (NORCO) 5-325 mg per tablet Take 1 Tab by mouth every six (6) hours as needed for Pain. Max Daily Amount: 4 Tabs. 15 Tab 0    chlorproMAZINE (THORAZINE) 25 mg tablet TAKE ONE TABLET BY MOUTH EVERY DAY AS NEEDED FOR INTRACTABLE HICCUPPS  0    VITAMIN D2 50,000 unit capsule TAKE ONE CAPSULE BY MOUTH EVERY 7 DAYS.  0    predniSONE (DELTASONE) 10 mg tablet two TABLETS BY MOUTH EVERY DAY  1    prochlorperazine (COMPAZINE) 5 mg tablet TAKE ONE TABLET BY MOUTH THREE TIMES A DAY AS NEEDED FOR NAUSEA AND VOMITING  0    loperamide (IMODIUM) 2 mg capsule Take  by mouth as needed for Diarrhea.  cyclobenzaprine (FLEXERIL) 10 mg tablet Take  by mouth three (3) times daily as needed for Muscle Spasm(s).  simethicone (MYLICON) 80 mg chewable tablet Take 80 mg by mouth.       sertraline (ZOLOFT) 50 mg tablet TAKE 1 TABLET BY MOUTH EVERY DAY 30 Tab 3    omeprazole (PRILOSEC) 20 mg capsule Take 20 mg by mouth daily.  traZODone (DESYREL) 100 mg tablet Take 1 Tab by mouth nightly. 30 Tab 1    atorvastatin (LIPITOR) 80 mg tablet Take 1 Tab by mouth daily. 90 Tab 1    aspirin delayed-release 81 mg tablet Take  by mouth daily.  metoprolol tartrate (LOPRESSOR) 25 mg tablet TAKE ONE-HALF TABLET BY MOUTH EVERY 12 HOURS  0     Allergies   Allergen Reactions    Simvastatin Myalgia     Pt takes this, says he is not allergic     Visit Vitals    /69 (BP 1 Location: Left arm, BP Patient Position: Sitting)    Pulse (!) 102    Temp 99.5 °F (37.5 °C) (Oral)    Resp 19    Ht 5' 7\" (1.702 m)    Wt 151 lb (68.5 kg)    SpO2 96%    BMI 23.65 kg/m2     Physical Exam   Constitutional: He is oriented to person, place, and time. He appears well-developed and well-nourished. No distress. HENT:   Head: Normocephalic and atraumatic. Right Ear: External ear normal.   Left Ear: External ear normal.   Mouth/Throat: Oropharynx is clear and moist. No oropharyngeal exudate. Eyes: Conjunctivae and EOM are normal. Pupils are equal, round, and reactive to light. Cardiovascular: Normal rate and regular rhythm. Pulmonary/Chest: Effort normal and breath sounds normal.   Abdominal: Soft. He exhibits no distension. There is tenderness. Suprapubic tenderness, umbilical hernia tender, reducible   Musculoskeletal: He exhibits no edema. Lymphadenopathy:     He has no cervical adenopathy. Neurological: He is alert and oriented to person, place, and time. Skin: Skin is warm and dry. Abdominal wound healed   Psychiatric: He has a normal mood and affect. Nursing note and vitals reviewed.     Recent Results (from the past 12 hour(s))   AMB POC URINALYSIS DIP STICK MANUAL W/O MICRO    Collection Time: 04/05/18  1:57 PM   Result Value Ref Range    Color (UA POC) Mariza     Clarity (UA POC) Clear     Glucose (UA POC) Negative Negative    Bilirubin (UA POC) Negative Negative    Ketones (UA POC) Negative Negative    Specific gravity (UA POC) 1.015 1.001 - 1.035    Blood (UA POC) 2+ Negative    pH (UA POC) 6.0 4.6 - 8.0    Protein (UA POC) 1+ Negative    Urobilinogen (UA POC) 1 mg/dL 0.2 - 1    Nitrites (UA POC) Negative Negative    Leukocyte esterase (UA POC) Negative Negative       ASSESSMENT and PLAN    ICD-10-CM ICD-9-CM    1. Functional diarrhea K59.1 564.5    2. Dysuria R30.0 788.1 AMB POC URINALYSIS DIP STICK MANUAL W/O MICRO   3. Essential hypertension I10 401.9 CBC WITH AUTOMATED DIFF      METABOLIC PANEL, COMPREHENSIVE      WA HANDLG&/OR CONVEY OF SPEC FOR TR OFFICE TO LAB      COLLECTION VENOUS BLOOD,VENIPUNCTURE   4. Anemia, unspecified type D64.9 285.9 IRON PROFILE   5. Hematuria, unspecified type R31.9 599.70    6.  Umbilical hernia without obstruction and without gangrene K42.9 553.1    start Fiber  Stop Iron  Recheck urine in 2 w  Monitor hernia and may try hernia belt with golf ball  Cont ensure or high protein shakes and PT

## 2018-04-06 LAB
ALBUMIN SERPL-MCNC: 2 G/DL (ref 3.6–4.8)
ALBUMIN/GLOB SERPL: 0.7 {RATIO} (ref 1.2–2.2)
ALP SERPL-CCNC: 97 IU/L (ref 39–117)
ALT SERPL-CCNC: 76 IU/L (ref 0–44)
AST SERPL-CCNC: 45 IU/L (ref 0–40)
BASOPHILS # BLD AUTO: 0 X10E3/UL (ref 0–0.2)
BASOPHILS NFR BLD AUTO: 0 %
BILIRUB SERPL-MCNC: <0.2 MG/DL (ref 0–1.2)
BUN SERPL-MCNC: 21 MG/DL (ref 8–27)
BUN/CREAT SERPL: 27 (ref 10–24)
CALCIUM SERPL-MCNC: 7.6 MG/DL (ref 8.6–10.2)
CHLORIDE SERPL-SCNC: 97 MMOL/L (ref 96–106)
CO2 SERPL-SCNC: 21 MMOL/L (ref 18–29)
CREAT SERPL-MCNC: 0.78 MG/DL (ref 0.76–1.27)
EOSINOPHIL # BLD AUTO: 0 X10E3/UL (ref 0–0.4)
EOSINOPHIL NFR BLD AUTO: 1 %
ERYTHROCYTE [DISTWIDTH] IN BLOOD BY AUTOMATED COUNT: 18.4 % (ref 12.3–15.4)
GFR SERPLBLD CREATININE-BSD FMLA CKD-EPI: 112 ML/MIN/1.73
GFR SERPLBLD CREATININE-BSD FMLA CKD-EPI: 97 ML/MIN/1.73
GLOBULIN SER CALC-MCNC: 2.8 G/DL (ref 1.5–4.5)
GLUCOSE SERPL-MCNC: 120 MG/DL (ref 65–99)
HCT VFR BLD AUTO: 32.9 % (ref 37.5–51)
HGB BLD-MCNC: 10.4 G/DL (ref 13–17.7)
IMM GRANULOCYTES # BLD: 0 X10E3/UL (ref 0–0.1)
IMM GRANULOCYTES NFR BLD: 0 %
IRON SATN MFR SERPL: 11 % (ref 15–55)
IRON SERPL-MCNC: 13 UG/DL (ref 38–169)
LYMPHOCYTES # BLD AUTO: 1 X10E3/UL (ref 0.7–3.1)
LYMPHOCYTES NFR BLD AUTO: 17 %
MCH RBC QN AUTO: 26.4 PG (ref 26.6–33)
MCHC RBC AUTO-ENTMCNC: 31.6 G/DL (ref 31.5–35.7)
MCV RBC AUTO: 84 FL (ref 79–97)
MONOCYTES # BLD AUTO: 0.7 X10E3/UL (ref 0.1–0.9)
MONOCYTES NFR BLD AUTO: 13 %
NEUTROPHILS # BLD AUTO: 3.9 X10E3/UL (ref 1.4–7)
NEUTROPHILS NFR BLD AUTO: 69 %
PLATELET # BLD AUTO: 356 X10E3/UL (ref 150–379)
POTASSIUM SERPL-SCNC: 4.9 MMOL/L (ref 3.5–5.2)
PROT SERPL-MCNC: 4.8 G/DL (ref 6–8.5)
RBC # BLD AUTO: 3.94 X10E6/UL (ref 4.14–5.8)
SODIUM SERPL-SCNC: 134 MMOL/L (ref 134–144)
TIBC SERPL-MCNC: 122 UG/DL (ref 250–450)
UIBC SERPL-MCNC: 109 UG/DL (ref 111–343)
WBC # BLD AUTO: 5.7 X10E3/UL (ref 3.4–10.8)

## 2018-04-06 NOTE — PROGRESS NOTES
Call pt, the Hb is still low, the Iron is also still low, so if poss continue the Iron, but if that is the cause of the diarrhea RTC and we can set him up for Iron infusions  The sugar is a little up, we will monitor  The kidney tests are normal  The Albumin and Protein levels are low, cont with high protein shakes  The liver tests are a little up,   Recheck in 2 w

## 2018-04-09 NOTE — PROGRESS NOTES
I have called and talked to this patient's wife, advised of lab results per Dr Delbert Shafer review. Call transferred to PSR's to make apt.

## 2018-04-12 ENCOUNTER — TELEPHONE (OUTPATIENT)
Dept: FAMILY MEDICINE CLINIC | Age: 63
End: 2018-04-12

## 2018-04-25 ENCOUNTER — TELEPHONE (OUTPATIENT)
Dept: FAMILY MEDICINE CLINIC | Age: 63
End: 2018-04-25

## 2018-04-25 RX ORDER — HYDROXYZINE 25 MG/1
TABLET, FILM COATED ORAL
Qty: 45 TAB | Refills: 1 | Status: SHIPPED | OUTPATIENT
Start: 2018-04-25 | End: 2018-08-06 | Stop reason: SDUPTHER

## 2018-04-25 NOTE — TELEPHONE ENCOUNTER
----- Message from Prem Rene sent at 4/25/2018  5:10 PM EDT -----  Regarding: /Refill  The pt is requesting a refill on \"Chlorpromazine\" be sent to [facility]. Best Contact [phone].       [place] Staff message

## 2018-04-26 RX ORDER — CHLORPROMAZINE HYDROCHLORIDE 25 MG/1
TABLET, FILM COATED ORAL
Refills: 0 | OUTPATIENT
Start: 2018-04-26

## 2018-04-27 NOTE — TELEPHONE ENCOUNTER
I have called and talked to this patient's wife, advised that this RX was refused because patient needs apt. She reports that he will probably end up in the ED to get this because he is out and when he gets these hiccups, he can't catch his breath and tends to aspirate.

## 2018-04-27 NOTE — TELEPHONE ENCOUNTER
Pt's wife calls to state that pt is completely out of his medicine clorpromazine 25 mg tablets that he uses  for hiccups. They have an appointment for next week on May 3rd. Will you please write an rx to cover him until that appointment. Please call her to let her know if this can be done and when it might be ready. Best number is .

## 2018-05-03 ENCOUNTER — OFFICE VISIT (OUTPATIENT)
Dept: FAMILY MEDICINE CLINIC | Age: 63
End: 2018-05-03

## 2018-05-03 VITALS
DIASTOLIC BLOOD PRESSURE: 80 MMHG | WEIGHT: 142 LBS | SYSTOLIC BLOOD PRESSURE: 119 MMHG | BODY MASS INDEX: 22.29 KG/M2 | RESPIRATION RATE: 16 BRPM | OXYGEN SATURATION: 98 % | HEART RATE: 104 BPM | TEMPERATURE: 96.4 F | HEIGHT: 67 IN

## 2018-05-03 DIAGNOSIS — J44.9 CHRONIC OBSTRUCTIVE PULMONARY DISEASE, UNSPECIFIED COPD TYPE (HCC): ICD-10-CM

## 2018-05-03 DIAGNOSIS — G62.9 NEUROPATHY: ICD-10-CM

## 2018-05-03 DIAGNOSIS — R19.7 DIARRHEA, UNSPECIFIED TYPE: ICD-10-CM

## 2018-05-03 DIAGNOSIS — D64.9 ANEMIA, UNSPECIFIED TYPE: ICD-10-CM

## 2018-05-03 DIAGNOSIS — R79.89 ELEVATED LIVER FUNCTION TESTS: ICD-10-CM

## 2018-05-03 DIAGNOSIS — R31.9 HEMATURIA, UNSPECIFIED TYPE: Primary | ICD-10-CM

## 2018-05-03 RX ORDER — BISMUTH SUBSALICYLATE 262 MG
1 TABLET,CHEWABLE ORAL DAILY
COMMUNITY
End: 2018-12-11

## 2018-05-03 RX ORDER — HYDROCODONE BITARTRATE AND ACETAMINOPHEN 5; 325 MG/1; MG/1
1 TABLET ORAL
Qty: 10 TAB | Refills: 0 | Status: SHIPPED | OUTPATIENT
Start: 2018-05-03 | End: 2018-06-19

## 2018-05-03 RX ORDER — UREA 10 %
LOTION (ML) TOPICAL
COMMUNITY
End: 2018-12-11

## 2018-05-03 RX ORDER — DICYCLOMINE HYDROCHLORIDE 10 MG/1
10 CAPSULE ORAL
COMMUNITY
End: 2018-08-01 | Stop reason: SDUPTHER

## 2018-05-03 RX ORDER — ONDANSETRON 4 MG/1
4 TABLET, ORALLY DISINTEGRATING ORAL
COMMUNITY
End: 2018-10-30 | Stop reason: SDUPTHER

## 2018-05-03 NOTE — PROGRESS NOTES
HISTORY OF PRESENT ILLNESS  Casi Wild is a 58 y.o. male. Chief Complaint   Patient presents with    Follow-up     2 week follow up, labs (?), diarrhea       HPI  DM  Last HbA1C 6.3 in Feb 22    Hematuria  Here for recheck of UA    Anemia  Still on Iron    Stopped Prilosec     Elevated liver tests    Lost 8 lbs  Diarrhea started again 2 d ago  Sees GI  Had CT scan 2 d ago  Scheduling Colonoscopy    Alertness better  Gets weak    Cont with ensure  Trouble eating   Tried Zn    Had CMV and Cryptosporidium  Was on antiviral and ABX    Taking Imodium      Review of Systems   Constitutional: Positive for chills and weight loss. Negative for fever. HENT: Negative for congestion. Respiratory: Positive for cough and sputum production (sometimes). Negative for shortness of breath and wheezing. Cardiovascular: Positive for palpitations. Negative for chest pain. Gastrointestinal: Positive for abdominal pain (occ), diarrhea, nausea and vomiting (once or twice with cough). Genitourinary: Negative for dysuria. Musculoskeletal:        Left leg pain     Past Medical History:   Diagnosis Date    Bilateral hip pain     Colon polyps 2013    COPD (chronic obstructive pulmonary disease) ([facility] [address] 04/2017    moderate    Diverticulitis     ED (erectile dysfunction)     Enteritis 03/2018    Essential hypertension, benign 2006    Hernia of abdominal wall 2017    History of MI (myocardial infarction) 11/10/2016    Hypercholesterolemia     IGT (impaired glucose tolerance)     Inguinal hernia 2017    (R)    Irritable bowel syndrome (IBS) 23/1736    Metabolic syndrome     Mononeuropathy     femoral nerve left    Paresthesia of left foot     Smoker      Current Outpatient Prescriptions   Medication Sig Dispense Refill    ondansetron (ZOFRAN ODT) 4 mg disintegrating tablet Take 4 mg by mouth every eight (8) hours as needed for Nausea.  zinc sulfate 220 mg tablet Take  by mouth.       dicyclomine (BENTYL) 10 mg capsule Take 10 mg by mouth 4 times daily (before meals and nightly).  multivitamin (ONE A DAY) tablet Take 1 Tab by mouth daily.  HYDROcodone-acetaminophen (NORCO) 5-325 mg per tablet Take 1 Tab by mouth every six (6) hours as needed for Pain. Max Daily Amount: 4 Tabs. 10 Tab 0    hydrOXYzine HCl (ATARAX) 25 mg tablet TAKE 1 TABLET BY MOUTH TWICE DAILY FOR ANXIETY 45 Tab 1    B.infantis-B.ani-B.long-B.bifi (PROBIOTIC 4X) 10-15 mg TbEC Take  by mouth.  predniSONE (DELTASONE) 10 mg tablet two TABLETS BY MOUTH EVERY DAY  1    prochlorperazine (COMPAZINE) 5 mg tablet TAKE ONE TABLET BY MOUTH THREE TIMES A DAY AS NEEDED FOR NAUSEA AND VOMITING  0    loperamide (IMODIUM) 2 mg capsule Take  by mouth as needed for Diarrhea.  cyclobenzaprine (FLEXERIL) 10 mg tablet Take  by mouth three (3) times daily as needed for Muscle Spasm(s).  simethicone (MYLICON) 80 mg chewable tablet Take 80 mg by mouth.  sertraline (ZOLOFT) 50 mg tablet TAKE 1 TABLET BY MOUTH EVERY DAY 30 Tab 3    omeprazole (PRILOSEC) 20 mg capsule Take 20 mg by mouth daily.  traZODone (DESYREL) 100 mg tablet Take 1 Tab by mouth nightly. (Patient taking differently: Take 100 mg by mouth as needed.) 30 Tab 1    atorvastatin (LIPITOR) 80 mg tablet Take 1 Tab by mouth daily. 90 Tab 1    aspirin delayed-release 81 mg tablet Take  by mouth daily. Allergies   Allergen Reactions    Simvastatin Myalgia     Pt takes this, says he is not allergic     Visit Vitals    /80 (BP 1 Location: Right arm, BP Patient Position: Sitting)    Pulse (!) 104    Temp 96.4 °F (35.8 °C) (Temporal)    Resp 16    Ht 5' 7\" (1.702 m)    Wt 142 lb (64.4 kg)    SpO2 98%    BMI 22.24 kg/m2     Physical Exam   Constitutional: He is oriented to person, place, and time. He appears well-developed and well-nourished. No distress. HENT:   Head: Normocephalic and atraumatic.    Mouth/Throat: Oropharynx is clear and moist. No oropharyngeal exudate. Eyes: Conjunctivae and EOM are normal. Pupils are equal, round, and reactive to light. Cardiovascular: Normal rate, regular rhythm and intact distal pulses. Pulmonary/Chest: Effort normal and breath sounds normal.   Abdominal: Soft. There is tenderness (right low abdomen). Musculoskeletal: He exhibits no edema. Neurological: He is alert and oriented to person, place, and time. Skin: Skin is warm and dry. There is pallor. Psychiatric: He has a normal mood and affect. Nursing note and vitals reviewed. ASSESSMENT and PLAN    ICD-10-CM ICD-9-CM    1. Hematuria, unspecified type R31.9 599.70 AMB POC URINALYSIS DIP STICK MANUAL W/O MICRO   2. Diarrhea, unspecified type R19.7 787.91 C DIFFICILE, CYTOTOXIN B   3. Elevated liver function tests Q88.98 833.6 METABOLIC PANEL, COMPREHENSIVE   4. Anemia, unspecified type D64.9 285.9 CBC WITH AUTOMATED DIFF   5. Chronic obstructive pulmonary disease, unspecified COPD type ([facility] J44.9 496 stable   6.  Neuropathy G62.9 355.9 HYDROcodone-acetaminophen (NORCO) 5-325 mg per tablet          Pt advised to increase fluids and food, cont high protein shakes  F/U with GI  If getting worse go back to ER

## 2018-06-13 ENCOUNTER — TELEPHONE (OUTPATIENT)
Dept: FAMILY MEDICINE CLINIC | Age: 63
End: 2018-06-13

## 2018-06-13 NOTE — TELEPHONE ENCOUNTER
I have called and talked to patient's wife, advised that I will send Dr Naomi Dorado her message, she will not be in the office until tomorrow but as soon as she responds we will call them back. Patient's wife verbalizes understanding.

## 2018-06-13 NOTE — TELEPHONE ENCOUNTER
Wife reports patient as having severe hiccups. Asking for Dr. Bennie Caruso to call him something in to [facility] for them.

## 2018-06-14 DIAGNOSIS — R06.6 INTRACTABLE HICCUPS: Primary | ICD-10-CM

## 2018-06-15 NOTE — TELEPHONE ENCOUNTER
I have called and left a message for this patient, he needs to make apt to come see Dr Naomi Dorado about his hiccups and medications.

## 2018-06-15 NOTE — TELEPHONE ENCOUNTER
Call pt, the Chlorpromazine is interacting with several of his current meds  I need for pt to come in to stop all these meds before he can take it

## 2018-06-18 NOTE — TELEPHONE ENCOUNTER
I have called and talked to this patient, advised him that he needs apt to see Dr Sarah Doyle about his hiccups. I have transferred the call to the PSR's to make an apt.

## 2018-06-19 ENCOUNTER — OFFICE VISIT (OUTPATIENT)
Dept: FAMILY MEDICINE CLINIC | Age: 63
End: 2018-06-19

## 2018-06-19 VITALS
TEMPERATURE: 98.9 F | WEIGHT: 143 LBS | OXYGEN SATURATION: 96 % | SYSTOLIC BLOOD PRESSURE: 136 MMHG | HEIGHT: 67 IN | BODY MASS INDEX: 22.44 KG/M2 | HEART RATE: 120 BPM | RESPIRATION RATE: 18 BRPM | DIASTOLIC BLOOD PRESSURE: 88 MMHG

## 2018-06-19 DIAGNOSIS — K92.1 BLOOD IN STOOL: ICD-10-CM

## 2018-06-19 DIAGNOSIS — D64.9 ANEMIA, UNSPECIFIED TYPE: ICD-10-CM

## 2018-06-19 DIAGNOSIS — F41.9 ANXIETY: ICD-10-CM

## 2018-06-19 DIAGNOSIS — R14.2 BELCHING SYMPTOM: Primary | ICD-10-CM

## 2018-06-19 DIAGNOSIS — R19.7 DIARRHEA, UNSPECIFIED TYPE: ICD-10-CM

## 2018-06-19 DIAGNOSIS — R00.0 TACHYCARDIA: ICD-10-CM

## 2018-06-19 DIAGNOSIS — R79.89 ELEVATED LIVER FUNCTION TESTS: ICD-10-CM

## 2018-06-19 DIAGNOSIS — R30.0 DYSURIA: ICD-10-CM

## 2018-06-19 DIAGNOSIS — R60.9 EDEMA, UNSPECIFIED TYPE: ICD-10-CM

## 2018-06-19 LAB
BILIRUB UR QL STRIP: NORMAL
GLUCOSE UR-MCNC: NEGATIVE MG/DL
KETONES P FAST UR STRIP-MCNC: NORMAL MG/DL
PH UR STRIP: 6 [PH] (ref 4.6–8)
PROT UR QL STRIP: NORMAL
SP GR UR STRIP: 1.01 (ref 1–1.03)
UA UROBILINOGEN AMB POC: NORMAL (ref 0.2–1)
URINALYSIS CLARITY POC: CLEAR
URINALYSIS COLOR POC: YELLOW
URINE BLOOD POC: NORMAL
URINE LEUKOCYTES POC: NEGATIVE
URINE NITRITES POC: NEGATIVE

## 2018-06-19 RX ORDER — MELATONIN
1000
COMMUNITY
End: 2018-12-11

## 2018-06-19 RX ORDER — DIAZEPAM 2 MG/1
2 TABLET ORAL
Qty: 15 TAB | Refills: 0 | Status: SHIPPED | OUTPATIENT
Start: 2018-06-19 | End: 2018-10-30

## 2018-06-19 RX ORDER — FUROSEMIDE 20 MG/1
TABLET ORAL
Qty: 35 TAB | Refills: 0 | Status: SHIPPED | OUTPATIENT
Start: 2018-06-19 | End: 2018-10-30

## 2018-06-19 RX ORDER — LANOLIN ALCOHOL/MO/W.PET/CERES
CREAM (GRAM) TOPICAL
COMMUNITY
End: 2018-12-11

## 2018-06-19 NOTE — PROGRESS NOTES
HISTORY OF PRESENT ILLNESS  Farooq Patrick is a 61 y.o. male. Chief Complaint   Patient presents with    Hiccups     x 8 days, inhibit breathing       HPI  Has not gained any weight but has not lost any  Sees GI spec in July  Had Colonoscopy  Diagnosed with Iliitis    Still has hernia  Uses belt at times      Pain with urination  Burning lately    Hiccups  And anxiety attacks    On Ensure 2-3 times a day  Weight stable now    Review of Systems   Constitutional: Negative for weight loss. HENT: Negative for congestion. Respiratory: Negative for cough. Cardiovascular: Positive for chest pain. Gastrointestinal: Positive for abdominal pain, blood in stool and constipation (2 d ago). Negative for diarrhea and melena. Genitourinary: Positive for dysuria, frequency and urgency. Negative for flank pain and hematuria.      Past Medical History:   Diagnosis Date    Bilateral hip pain     Colon polyps 2013    COPD (chronic obstructive pulmonary disease) ([facility] [address] 04/2017    moderate    Diverticulitis     ED (erectile dysfunction)     Enteritis 03/2018    Essential hypertension, benign 2006    Hernia of abdominal wall 2017    History of MI (myocardial infarction) 11/10/2016    Hypercholesterolemia     IGT (impaired glucose tolerance)     Inguinal hernia 2017    (R)    Irritable bowel syndrome (IBS) 91/8337    Metabolic syndrome     Mononeuropathy     femoral nerve left    Paresthesia of left foot     Smoker        Past Surgical History:   Procedure Laterality Date    ENDOSCOPY, COLON, DIAGNOSTIC  2014    HX COLONOSCOPY      w\ polyps    HX CORONARY ARTERY BYPASS GRAFT      HX CORONARY ARTERY BYPASS GRAFT  11/21/2016    4 vessel    HX HERNIA REPAIR  37/9254    paraumbilical, 2 hernias    HX OTHER SURGICAL  09\20\2013    nl stress myoview    HX OTHER SURGICAL      Aortic abdominal bypass       Current Outpatient Prescriptions   Medication Sig Dispense Refill    ferrous sulfate (IRON) 325 mg (65 mg iron) tablet Take  by mouth Daily (before breakfast).  furosemide (LASIX) 20 mg tablet Take 2 tablets for 3 days then one a day 35 Tab 0    diazePAM (VALIUM) 2 mg tablet Take 1 Tab by mouth every twelve (12) hours as needed for Anxiety. Max Daily Amount: 4 mg. 15 Tab 0    metoprolol tartrate (LOPRESSOR) 25 mg tablet TAKE ONE-HALF TABLET BY MOUTH EVERY 12 HOURS (Patient taking differently: 25 mg as needed. TAKE ONE-HALF TABLET BY MOUTH EVERY 12 HOURS) 30 Tab 3    ondansetron (ZOFRAN ODT) 4 mg disintegrating tablet Take 4 mg by mouth every eight (8) hours as needed for Nausea.  zinc sulfate 220 mg tablet Take  by mouth.  dicyclomine (BENTYL) 10 mg capsule Take 10 mg by mouth 4 times daily (before meals and nightly).  multivitamin (ONE A DAY) tablet Take 1 Tab by mouth daily.  hydrOXYzine HCl (ATARAX) 25 mg tablet TAKE 1 TABLET BY MOUTH TWICE DAILY FOR ANXIETY 45 Tab 1    B.infantis-B.ani-B.long-B.bifi (PROBIOTIC 4X) 10-15 mg TbEC Take  by mouth.  predniSONE (DELTASONE) 10 mg tablet 1 tablet per day now. 1    loperamide (IMODIUM) 2 mg capsule Take  by mouth as needed for Diarrhea.  cyclobenzaprine (FLEXERIL) 10 mg tablet Take  by mouth three (3) times daily as needed for Muscle Spasm(s).  sertraline (ZOLOFT) 50 mg tablet TAKE 1 TABLET BY MOUTH EVERY DAY 30 Tab 3    omeprazole (PRILOSEC) 20 mg capsule Take 20 mg by mouth daily.  traZODone (DESYREL) 100 mg tablet Take 1 Tab by mouth nightly. (Patient taking differently: Take 100 mg by mouth as needed.) 30 Tab 1    atorvastatin (LIPITOR) 80 mg tablet Take 1 Tab by mouth daily. 90 Tab 1    aspirin delayed-release 81 mg tablet Take  by mouth daily.  cholecalciferol (VITAMIN D3) 1,000 unit tablet Take 1,000 Units by mouth.       prochlorperazine (COMPAZINE) 5 mg tablet TAKE ONE TABLET BY MOUTH THREE TIMES A DAY AS NEEDED FOR NAUSEA AND VOMITING  0       Allergies   Allergen Reactions    Simvastatin Myalgia     Pt takes this, says he is not allergic       Visit Vitals    /88 (BP 1 Location: Right arm, BP Patient Position: Sitting)    Pulse (!) 120    Temp 98.9 °F (37.2 °C) (Oral)    Resp 18    Ht 5' 7\" (1.702 m)    Wt 143 lb (64.9 kg)    SpO2 96%    BMI 22.4 kg/m2     Physical Exam   Constitutional: He is oriented to person, place, and time. He appears well-developed and well-nourished. No distress. HENT:   Head: Normocephalic and atraumatic. Right Ear: External ear normal.   Mouth/Throat: Oropharynx is clear and moist. No oropharyngeal exudate. Left Cerumen   Eyes: Conjunctivae and EOM are normal. Pupils are equal, round, and reactive to light. Cardiovascular: Regular rhythm. tachycardia   Pulmonary/Chest: Effort normal and breath sounds normal.   Abdominal: Soft. He exhibits no distension. There is tenderness (in low abdomen, hernia at umbilicus and right groin). Pt belching frequently, no hiccups   Musculoskeletal: He exhibits edema. Lymphadenopathy:     He has no cervical adenopathy. Neurological: He is alert and oriented to person, place, and time. Skin: Skin is warm and dry. Psychiatric:   anxious   Nursing note and vitals reviewed. EKG showed Tachycardia    ASSESSMENT and PLAN    ICD-10-CM ICD-9-CM    1. Belching symptom R14.2 787.3 diazePAM (VALIUM) 2 mg tablet   2. Edema, unspecified type R60.9 782.3 furosemide (LASIX) 20 mg tablet   3. Dysuria R30.0 788.1 AMB POC URINALYSIS DIP STICK MANUAL W/O MICRO      CULTURE, URINE   4. Tachycardia R00.0 785.0 AMB POC EKG ROUTINE W/ 12 LEADS, INTER & REP   5. Blood in stool K92.1 578.1 AMB POC FECAL BLOOD, OCCULT, QL 1 CARD   6. Anxiety F41.9 300.00 diazePAM (VALIUM) 2 mg tablet   7. Diarrhea, unspecified type R19.7 787.91 C DIFFICILE, CYTOTOXIN B      CANCELED: C DIFFICILE, CYTOTOXIN B   8. Anemia, unspecified type D64.9 285.9 CBC W/O DIFF      AL HANDLG&/OR CONVEY OF SPEC FOR TR OFFICE TO LAB      COLLECTION VENOUS BLOOD,VENIPUNCTURE   9. Elevated liver function tests N49.57 067.7 METABOLIC PANEL, COMPREHENSIVE

## 2018-06-20 LAB
ALBUMIN SERPL-MCNC: 1.9 G/DL (ref 3.6–4.8)
ALBUMIN/GLOB SERPL: 0.7 {RATIO} (ref 1.2–2.2)
ALP SERPL-CCNC: 110 IU/L (ref 39–117)
ALT SERPL-CCNC: 19 IU/L (ref 0–44)
AST SERPL-CCNC: 15 IU/L (ref 0–40)
BILIRUB SERPL-MCNC: 0.2 MG/DL (ref 0–1.2)
BUN SERPL-MCNC: 15 MG/DL (ref 8–27)
BUN/CREAT SERPL: 18 (ref 10–24)
CALCIUM SERPL-MCNC: 7.6 MG/DL (ref 8.6–10.2)
CHLORIDE SERPL-SCNC: 99 MMOL/L (ref 96–106)
CO2 SERPL-SCNC: 22 MMOL/L (ref 20–29)
CREAT SERPL-MCNC: 0.83 MG/DL (ref 0.76–1.27)
ERYTHROCYTE [DISTWIDTH] IN BLOOD BY AUTOMATED COUNT: 17.2 % (ref 12.3–15.4)
GFR SERPLBLD CREATININE-BSD FMLA CKD-EPI: 108 ML/MIN/1.73
GFR SERPLBLD CREATININE-BSD FMLA CKD-EPI: 94 ML/MIN/1.73
GLOBULIN SER CALC-MCNC: 2.8 G/DL (ref 1.5–4.5)
GLUCOSE SERPL-MCNC: 117 MG/DL (ref 65–99)
HCT VFR BLD AUTO: 28.6 % (ref 37.5–51)
HGB BLD-MCNC: 8.6 G/DL (ref 13–17.7)
MCH RBC QN AUTO: 27.1 PG (ref 26.6–33)
MCHC RBC AUTO-ENTMCNC: 30.1 G/DL (ref 31.5–35.7)
MCV RBC AUTO: 90 FL (ref 79–97)
PLATELET # BLD AUTO: 439 X10E3/UL (ref 150–379)
POTASSIUM SERPL-SCNC: 5.1 MMOL/L (ref 3.5–5.2)
PROT SERPL-MCNC: 4.7 G/DL (ref 6–8.5)
RBC # BLD AUTO: 3.17 X10E6/UL (ref 4.14–5.8)
SODIUM SERPL-SCNC: 137 MMOL/L (ref 134–144)
WBC # BLD AUTO: 8.3 X10E3/UL (ref 3.4–10.8)

## 2018-06-20 NOTE — PROGRESS NOTES
Call pt, the Hb is lower than before, please add on a B12 and Iron profile  The CBC does not show an infection  The kidney and liver tests are normal  The Albumin is very low, increase ensure to tid

## 2018-06-21 LAB — BACTERIA UR CULT: NORMAL

## 2018-06-26 ENCOUNTER — TELEPHONE (OUTPATIENT)
Dept: FAMILY MEDICINE CLINIC | Age: 63
End: 2018-06-26

## 2018-06-26 NOTE — TELEPHONE ENCOUNTER
[facility] to have patient come in for blood work for VitB12 #[id number] and Iron and TIBC #[id number]. Sample no longer available.

## 2018-06-26 NOTE — TELEPHONE ENCOUNTER
Notified wife that patient needs to come in for lab work for test#[id number] Iron and TIBC and #[id number] Vitamin B12 and Folate per

## 2018-06-26 NOTE — TELEPHONE ENCOUNTER
requested some test added for this patient and the sample is no longer available,so [facility] advise the patient to come in for Vit B12 and folate and iron and TIBC.

## 2018-07-03 ENCOUNTER — TELEPHONE (OUTPATIENT)
Dept: FAMILY MEDICINE CLINIC | Age: 63
End: 2018-07-03

## 2018-07-30 DIAGNOSIS — G47.09 OTHER INSOMNIA: ICD-10-CM

## 2018-07-30 DIAGNOSIS — F32.89 OTHER DEPRESSION: ICD-10-CM

## 2018-07-30 RX ORDER — TRAZODONE HYDROCHLORIDE 100 MG/1
TABLET ORAL
Qty: 30 TAB | Refills: 3 | Status: SHIPPED | OUTPATIENT
Start: 2018-07-30 | End: 2018-10-30

## 2018-07-30 RX ORDER — SERTRALINE HYDROCHLORIDE 50 MG/1
TABLET, FILM COATED ORAL
Qty: 30 TAB | Refills: 3 | Status: SHIPPED | OUTPATIENT
Start: 2018-07-30 | End: 2018-10-30 | Stop reason: SDUPTHER

## 2018-08-01 ENCOUNTER — CLINICAL SUPPORT (OUTPATIENT)
Dept: FAMILY MEDICINE CLINIC | Age: 63
End: 2018-08-01

## 2018-08-01 DIAGNOSIS — Z11.1 TUBERCULOSIS SCREENING: Primary | ICD-10-CM

## 2018-08-01 LAB
MM INDURATION POC: 0 MM (ref 0–5)
PPD POC: NEGATIVE NEGATIVE

## 2018-08-01 NOTE — PROGRESS NOTES
PPD Placement note  [place], 61 y.o. male is here today for placement of PPD test  Reason for PPD test: r/o TB in order to begin Humira Rx  Pt taken PPD test before: yes  Verified in allergy area and with patient that they are not allergic to the products PPD is made of (Phenol or Tween). No: no  Is patient taking any oral or IV steroid medication now or have they taken it in the last month? no  Has the patient ever received the BCG vaccine?: no  Has the patient been in recent contact with anyone known or suspected of having active TB disease?: no       Date of exposure (if applicable): n/a       Name of person they were exposed to (if applicable): n/a  Patient's Country of origin?: [place]  O: Alert and oriented in NAD. P:  PPD placed on 8/1/2018 left forearm. Patient advised to return for reading within 48-72 hours.

## 2018-08-02 RX ORDER — DICYCLOMINE HYDROCHLORIDE 10 MG/1
CAPSULE ORAL
Qty: 56 CAP | Refills: 0 | Status: SHIPPED | OUTPATIENT
Start: 2018-08-02 | End: 2018-10-30

## 2018-08-03 NOTE — PROGRESS NOTES
PPD Reading Note  PPD read and results entered in [address]. Result: 0 mm induration.   Interpretation: No reaction  If test not read within 48-72 hours of initial placement, patient advised to repeat in other arm 1-3 weeks after this test.  Allergic reaction: no

## 2018-08-06 RX ORDER — HYDROXYZINE 25 MG/1
TABLET, FILM COATED ORAL
Qty: 45 TAB | Refills: 1 | Status: SHIPPED | OUTPATIENT
Start: 2018-08-06 | End: 2019-01-08

## 2018-08-14 ENCOUNTER — OFFICE VISIT (OUTPATIENT)
Dept: FAMILY MEDICINE CLINIC | Age: 63
End: 2018-08-14

## 2018-08-14 VITALS
HEIGHT: 67 IN | WEIGHT: 146 LBS | DIASTOLIC BLOOD PRESSURE: 103 MMHG | RESPIRATION RATE: 18 BRPM | SYSTOLIC BLOOD PRESSURE: 133 MMHG | TEMPERATURE: 95.5 F | OXYGEN SATURATION: 99 % | BODY MASS INDEX: 22.91 KG/M2 | HEART RATE: 109 BPM

## 2018-08-14 DIAGNOSIS — G58.9 MONONEUROPATHY: ICD-10-CM

## 2018-08-14 DIAGNOSIS — R43.9 DISORDER OF TASTE: ICD-10-CM

## 2018-08-14 DIAGNOSIS — R06.6 HICCUPS: ICD-10-CM

## 2018-08-14 DIAGNOSIS — R53.1 WEAKNESS GENERALIZED: Primary | ICD-10-CM

## 2018-08-14 DIAGNOSIS — R60.0 BILATERAL LOWER EXTREMITY EDEMA: ICD-10-CM

## 2018-08-14 RX ORDER — AMITRIPTYLINE HYDROCHLORIDE 10 MG/1
TABLET, FILM COATED ORAL
Qty: 90 TAB | Refills: 0 | Status: SHIPPED | OUTPATIENT
Start: 2018-08-14 | End: 2018-10-30 | Stop reason: SINTOL

## 2018-08-14 NOTE — PROGRESS NOTES
Yareli Rosario is a 61 y.o. male who presents to the office today with the following:  Chief Complaint   Patient presents with    Fatigue     x 10 days, L leg weeping, extreme pain in L leg       HPI  Pt here with wife. Wife says she brought him in today bc he is getting weaker past few weeks, cannot get up or in bed by himself. Once up is able to ambulate with the assistance of a walker. Weakness is secondary to pt not eating and also severe left LE nerve pain. Pt states cannot do anything on own. Wife has to return to work soon and worried about his being alone at home. Has no home health care or other assistance currently. Pt reports permanent nerve damage in LLE and chronic swelling in LEs. Left leg is now weeping clear fluid from swelling. Has not been taking Lasix because he does not want to have to pee constantly since he cannot get up to restroom, though he has a urinal to use in bed. Has had falls in the past. Most recent was Friday, suffered a superficial abrasion to left elbow, but w/o significant injury and denies any recent head injury/trauma or other complaints. Is not concerned with elbow- no complaints about this today. Pt has had ongoing issues since herniation repair and bowel perforation in December 2017. With residual n/v/d and fecal incontinence. Has been tx multiple times for weakness and dehydration secondary to his issues of not eating/drinking enough. Per wife \"just told to drink ensures and everything would be fine\"  Pt states \"I can't eat\" but able to drink ensure, however, pt says \"I even have to force that down now\" due to \"everything tastes bad\"  Pt and wife note is not a problem with his appetite. Pt admits he gets hungry, he just \"does not want to eat because everything tastes bad now\"   Gastroenterologist just said his zinc was low would help with taste. Has been taking and notes no difference. Most recent dx IBD. Suppose to try Humira.    Does not have another apt with [facility] until October. Pt also c/o ongoing hiccups. Previously seen by Dr. Madhu Robins and described more as belching at that time. Pt frustrated with this saying it is in fact hiccups and they will not stop, he only burps to try to stop them. Says \"they are constant\" and keep him up at night. Pt says in addition to his severe left leg pain, this is his main complaint today. Depression screen positive but is already dx and tx for depression. Pt states only due to situation/health. Doing okay overall on Zoloft and prn hydroxyzine and valium. Also takes Trazodone for sleep. Denies any SI/HI. Pt states he has never been depressed to the point of wanting to harm himself. Review of Systems   Constitutional: Negative for chills and fever. Respiratory: Negative for cough, sputum production, shortness of breath and wheezing. Cardiovascular: Positive for leg swelling and PND. Negative for chest pain and palpitations. Skin: Negative for rash. Neurological: Negative for headaches. See HPI.     Past Medical History:   Diagnosis Date    Bilateral hip pain     Colon polyps 2013    COPD (chronic obstructive pulmonary disease) ([facility] [address] 04/2017    moderate    Diverticulitis     ED (erectile dysfunction)     Enteritis 03/2018    Essential hypertension, benign 2006    Hernia of abdominal wall 2017    History of MI (myocardial infarction) 11/10/2016    Hypercholesterolemia     IGT (impaired glucose tolerance)     Inguinal hernia 2017    (R)    Irritable bowel syndrome (IBS) 96/1008    Metabolic syndrome     Mononeuropathy     femoral nerve left    Paresthesia of left foot     Smoker        Past Surgical History:   Procedure Laterality Date    ENDOSCOPY, COLON, DIAGNOSTIC  2014    HX COLONOSCOPY      w\ polyps    HX CORONARY ARTERY BYPASS GRAFT      HX CORONARY ARTERY BYPASS GRAFT  11/21/2016    4 vessel    HX HERNIA REPAIR  80/4345    paraumbilical, 2 hernias    HX OTHER SURGICAL  82\[id number]    nl stress myoview    HX OTHER SURGICAL      Aortic abdominal bypass       Allergies   Allergen Reactions    Simvastatin Myalgia     Pt takes this, says he is not allergic       Current Outpatient Prescriptions   Medication Sig    amitriptyline (ELAVIL) 10 mg tablet 10 mg po (1 tab) in AM, and 20 mg (2 tabs) at bedtime. Hold trazodone while taking    hydrOXYzine HCl (ATARAX) 25 mg tablet TAKE 1 TABLET BY MOUTH TWICE DAILY FOR ANXIETY    dicyclomine (BENTYL) 10 mg capsule TAKE ONE CAPSULE BY MOUTH FOUR TIMES A DAY AS NEEDED FOR ABDOMINAL PAIN    sertraline (ZOLOFT) 50 mg tablet TAKE 1 TABLET BY MOUTH DAILY    traZODone (DESYREL) 100 mg tablet TAKE 1 TABLET BY MOUTH NIGHTLY    atorvastatin (LIPITOR) 80 mg tablet TAKE 1 TABLET BY MOUTH DAILY    cholecalciferol (VITAMIN D3) 1,000 unit tablet Take 1,000 Units by mouth.  ferrous sulfate (IRON) 325 mg (65 mg iron) tablet Take  by mouth Daily (before breakfast).  furosemide (LASIX) 20 mg tablet Take 2 tablets for 3 days then one a day    diazePAM (VALIUM) 2 mg tablet Take 1 Tab by mouth every twelve (12) hours as needed for Anxiety. Max Daily Amount: 4 mg.  metoprolol tartrate (LOPRESSOR) 25 mg tablet TAKE ONE-HALF TABLET BY MOUTH EVERY 12 HOURS (Patient taking differently: 25 mg as needed. TAKE ONE-HALF TABLET BY MOUTH EVERY 12 HOURS)    ondansetron (ZOFRAN ODT) 4 mg disintegrating tablet Take 4 mg by mouth every eight (8) hours as needed for Nausea.  zinc sulfate 220 mg tablet Take  by mouth.  multivitamin (ONE A DAY) tablet Take 1 Tab by mouth daily.  B.infantis-B.ani-B.long-B.bifi (PROBIOTIC 4X) 10-15 mg TbEC Take  by mouth.  predniSONE (DELTASONE) 10 mg tablet 1 tablet per day now.  prochlorperazine (COMPAZINE) 5 mg tablet TAKE ONE TABLET BY MOUTH THREE TIMES A DAY AS NEEDED FOR NAUSEA AND VOMITING    loperamide (IMODIUM) 2 mg capsule Take  by mouth as needed for Diarrhea.     cyclobenzaprine (FLEXERIL) 10 mg tablet Take  by mouth three (3) times daily as needed for Muscle Spasm(s).  omeprazole (PRILOSEC) 20 mg capsule Take 20 mg by mouth daily.  aspirin delayed-release 81 mg tablet Take  by mouth daily. No current facility-administered medications for this visit. Social History     Social History    Marital status:      Spouse name: N/A    Number of children: N/A    Years of education: N/A     Social History Main Topics    Smoking status: Former Smoker     Years: 46.00     Types: Cigarettes    Smokeless tobacco: Former User     Quit date: 11/20/2016    Alcohol use No      Comment: rare    Drug use: No    Sexual activity: Yes     Partners: Female     Other Topics Concern    Blood Transfusions No    Sleep Concern Yes     Works swing shift    Exercise Yes    Seat Belt Yes    Self-Exams Yes     Social History Narrative       Family History   Problem Relation Age of Onset    Cancer Sister      cervical    Hypertension Brother          Physical Exam:  Visit Vitals    BP (!) 133/103 (BP 1 Location: Left arm, BP Patient Position: At rest)    Pulse (!) 109    Temp 95.5 °F (35.3 °C)    Resp 18    Ht 5' 7\" (1.702 m)    Wt 146 lb (66.2 kg)    SpO2 99%    BMI 22.87 kg/m2     Physical Exam   Constitutional: He is oriented to person, place, and time. He appears not lethargic. He appears unhealthy. He does not have a sickly appearance. No distress. HENT:   Head: Normocephalic and atraumatic. Eyes: Conjunctivae are normal.   Neck: Neck supple. Cardiovascular: Normal rate and regular rhythm. Pulmonary/Chest: Effort normal and breath sounds normal.   Abdominal: Soft. There is no tenderness. Large central abd scar with reducible hernia   Musculoskeletal: He exhibits edema (4+ pitting bilat LEs, left leg wheeping clear fluid). Lymphadenopathy:     He has no cervical adenopathy. Neurological: He is oriented to person, place, and time. He appears not lethargic.    Exam limited to pt limited mobility. Left leg is extremely painful to any touch or manipulation throughout most of LE past the knee. nonfocal and other than edema/wheeping no other findings. Skin: Skin is warm. Abrasion (superficial left elbow, no sign of inf) noted. No bruising, no ecchymosis and no rash noted. No erythema. Legs without any warmth/erythema. Psychiatric: Mood and affect normal.   Nursing note and vitals reviewed. Assessment/Plan:    ICD-10-CM ICD-9-CM    1. Weakness generalized R53.1 780.79    2. Hiccups R06.6 786.8 amitriptyline (ELAVIL) 10 mg tablet   3. Disorder of taste R43.9 781.1    4. Mononeuropathy G58.9 355.9    5. Bilateral lower extremity edema R60.0 782.3       Lengthy discussion with pt and wife. Consulted with Dr. Alecia Cordero. She recommend provide with list of home health and assistance programs which I gave to pt/wife to call and see what they could do for them. Also discussed private sitter vs nursing home- which they have financial concerns (why they have not pursued these options already). Dr. Alecia Cordero notes previous visit pt was belching more than hiccups so did not put on meds for that. She says haldol may be an option, however, due to finding only IM rec d/w Dr. Edy Cruz and he rec amitriptyline instead- will have pt hold Trazodone for now and take one Amitriptyline in AM and two in PM to replace for insomnia and to attempt tx for hiccups also. Pt also encouraged to f/u with gastroenterologist sooner rather than later. Wife says they already had to bump pt apt back due to provider scheduling issues. Also, per Dr. Alecia Cordero recommendation. Remeron may be option for appetite. However, when discussing this option with pt/wife they say appetite is fine it is only the issue with taste so we agreed to hold off on tx for now. Will cont zinc and ensures and discuss with PCP and GI. Dr. Alecia Cordero did also rec offer to pt very low dose pain medication.   However, I discussed concerns with SEs, medication interactions, and concerns with pt h/o falls and pt/wife agree to hold off of this for now also. Will f/u with Neurology for chronic nerve pain in left leg. Missed his f/u in January due to hospitalization. Has apt scheduled for October, may try to get in sooner. Rec pt resume Lasix and use bed pain/urinal so swelling will go down in legs and weeping should improve. Also will try to elevated above heart level, as he has only been elevating slightly. Compression unlikely option with how tender pt is from nerve pain in left leg. Offered Rx for wheelchair and/or bedside commode, however, wife states no point because it is not issues of pt getting around more so it is the initial standing from lying/sitting. She will get bed pan for him though as he only has urinal as of now. Total time spent with the patient today was 25+ minutes of which more than 50% was spent face to face with the patient. Follow-up Disposition:  Return in about 1 week (around 8/21/2018), or sooner if symptoms worsen or fail to improve, for f/u with PCP Dr. Lina Sher. To ER for any severe or worsening sxs in interim. Pt/wife verbalizes understanding and agrees with the plan.     Iker Bernal PA-C

## 2018-08-14 NOTE — MR AVS SNAPSHOT
[facility] 
[phone] Patient: Deya Kaufman MRN: [id number] ZII:[id number] Visit Information Date & Time Provider Department Dept. Phone Encounter #  
 8/14/2018 10:00 AM Pollo Aguilar PA-C [id number] [id number] Upcoming Health Maintenance Date Due Influenza Age 5 to Adult 8/1/2018 DTaP/Tdap/Td series (2 - Td) 8/6/2025 COLONOSCOPY 1/31/2028 Allergies as of 8/14/2018  Review Complete On: 8/14/2018 By: Pollo Aguilar PA-C Severity Noted Reaction Type Reactions Simvastatin  05/17/2014    Myalgia Pt takes this, says he is not allergic Current Immunizations  Reviewed on 9/28/2017 Name Date Influenza Vaccine 9/30/2016 Pneumococcal Polysaccharide (PPSV-23) 9/28/2017 TB Skin Test (PPD) Intradermal  Incomplete Tdap 8/6/2015 Not reviewed this visit You Were Diagnosed With   
  
 Codes Comments Hiccups    -  Primary ICD-10-CM: R06.6 ICD-9-CM: 541. 8 Vitals BP Pulse Temp Resp Height(growth percentile) Weight(growth percentile) (!) 133/103 (BP 1 Location: Left arm, BP Patient Position: At rest) (!) 109 95.5 °F (35.3 °C) 18 5' 7\" (1.702 m) 146 lb (66.2 kg) SpO2 BMI Smoking Status 99% 22.87 kg/m2 Former Smoker Vitals History BMI and BSA Data Body Mass Index Body Surface Area  
 22.87 kg/m 2 1.77 m 2 Preferred Pharmacy Pharmacy Name Phone [facility] [address] [place] [address] Your Updated Medication List  
  
   
This list is accurate as of 8/14/18 11:28 AM.  Always use your most recent med list.  
  
  
  
  
 amitriptyline 10 mg tablet Commonly known as:  ELAVIL 10 mg po (1 tab) in AM, and 20 mg (2 tabs) at bedtime. Hold trazodone while taking  
  
 aspirin delayed-release 81 mg tablet Take  by mouth daily. atorvastatin 80 mg tablet Commonly known as:  LIPITOR  
TAKE 1 TABLET BY MOUTH DAILY  
  
 cholecalciferol 1,000 unit tablet Commonly known as:  VITAMIN D3 Take 1,000 Units by mouth. cyclobenzaprine 10 mg tablet Commonly known as:  FLEXERIL Take  by mouth three (3) times daily as needed for Muscle Spasm(s). diazePAM 2 mg tablet Commonly known as:  VALIUM Take 1 Tab by mouth every twelve (12) hours as needed for Anxiety. Max Daily Amount: 4 mg.  
  
 dicyclomine 10 mg capsule Commonly known as:  BENTYL TAKE ONE CAPSULE BY MOUTH FOUR TIMES A DAY AS NEEDED FOR ABDOMINAL PAIN  
  
 furosemide 20 mg tablet Commonly known as:  LASIX Take 2 tablets for 3 days then one a day  
  
 hydrOXYzine HCl 25 mg tablet Commonly known as:  ATARAX TAKE 1 TABLET BY MOUTH TWICE DAILY FOR ANXIETY Iron 325 mg (65 mg iron) tablet Generic drug:  ferrous sulfate Take  by mouth Daily (before breakfast). loperamide 2 mg capsule Commonly known as:  IMODIUM Take  by mouth as needed for Diarrhea.  
  
 metoprolol tartrate 25 mg tablet Commonly known as:  LOPRESSOR  
TAKE ONE-HALF TABLET BY MOUTH EVERY 12 HOURS  
  
 multivitamin tablet Commonly known as:  ONE A DAY Take 1 Tab by mouth daily. omeprazole 20 mg capsule Commonly known as:  PRILOSEC Take 20 mg by mouth daily. ondansetron 4 mg disintegrating tablet Commonly known as:  ZOFRAN ODT Take 4 mg by mouth every eight (8) hours as needed for Nausea. predniSONE 10 mg tablet Commonly known as:  DELTASONE  
1 tablet per day now. PROBIOTIC 4X 10-15 mg Tbec Generic drug:  B.infantis-B.ani-B.long-B.bifi Take  by mouth.  
  
 prochlorperazine 5 mg tablet Commonly known as:  COMPAZINE  
TAKE ONE TABLET BY MOUTH THREE TIMES A DAY AS NEEDED FOR NAUSEA AND VOMITING  
  
 sertraline 50 mg tablet Commonly known as:  ZOLOFT  
TAKE 1 TABLET BY MOUTH DAILY  
  
 traZODone 100 mg tablet Commonly known as:  DESYREL  
TAKE 1 TABLET BY MOUTH NIGHTLY  
  
 zinc sulfate 220 mg tablet Take  by mouth. Prescriptions Printed Refills  
 amitriptyline (ELAVIL) 10 mg tablet 0 Sig: 10 mg po (1 tab) in AM, and 20 mg (2 tabs) at bedtime. Hold trazodone while taking Class: Print Introducing [facility] SERVICES! [employer] introduces [employer] patient portal. Now you can access parts of your medical record, email your doctor's office, and request medication refills online. 1. In your internet browser, go to [email or web address] 2. Click on the First Time User? Click Here link in the Sign In box. You will see the New Member Sign Up page. 3. Enter your [employer] Access Code exactly as it appears below. You will not need to use this code after youve completed the sign-up process. If you do not sign up before the expiration date, you must request a new code. · [employer] Access Code: [email or web address] Expires: 11/12/2018 11:28 AM 
 
4. Enter the last four digits of your Social Security Number (xxxx) and Date of Birth (mm/dd/yyyy) as indicated and click Submit. You will be taken to the next sign-up page. 5. Create a [employer] ID. This will be your [employer] login ID and cannot be changed, so think of one that is secure and easy to remember. 6. Create a [employer] password. You can change your password at any time. 7. Enter your Password Reset Question and Answer. This can be used at a later time if you forget your password. 8. Enter your e-mail address. You will receive e-mail notification when new information is available in [address] 9. Click Sign Up. You can now view and download portions of your medical record. 10. Click the Download Summary menu link to download a portable copy of your medical information. If you have questions, please visit the Frequently Asked Questions section of the [employer] website.  Remember, [employer] is NOT to be used for urgent needs. For medical emergencies, dial 911. Now available from your iPhone and Android! Please provide this summary of care documentation to your next provider. Your primary care clinician is listed as Bhargavi Hunt. If you have any questions after today's visit, please call [phone].

## 2018-08-16 ENCOUNTER — TELEPHONE (OUTPATIENT)
Dept: FAMILY MEDICINE CLINIC | Age: 63
End: 2018-08-16

## 2018-08-16 DIAGNOSIS — R53.1 WEAKNESS GENERALIZED: Primary | ICD-10-CM

## 2018-08-16 NOTE — TELEPHONE ENCOUNTER
Patient's wife requesting a referral to [address] [place] [address] Jerry Fall, [facility]. She can be reached at [phone].

## 2018-08-16 NOTE — TELEPHONE ENCOUNTER
I have called and talked to the patient's wife. She reports that patient is no longer able to get up out of the chair or bed by himself. He gets swollen up but does take a fluid pill. Atmiatry was prescribe by DENISE Bassett at last visit, this caused him to have a rash on his back, bottom, and left leg and his back is hurting very bad. Rash on back hurts to touch. She would like to know if Home Health could come in and work with him to build his strength back up to be able to get up himself.

## 2018-08-17 NOTE — TELEPHONE ENCOUNTER
Spoke to wife  I have ordered PT/OT and she got a call from Mayra Khoury but  Pt is back in the hospital in [place] having a colectomy, after having sever cramping yesterday  And Had rash from Elavil  Advised wife to have hospital set pt up for PT/OT again

## 2018-11-27 PROBLEM — I26.99 PE (PULMONARY THROMBOEMBOLISM) (HCC): Status: ACTIVE | Noted: 2018-08-01

## 2018-11-27 PROBLEM — O22.30 DVT (DEEP VEIN THROMBOSIS) IN PREGNANCY: Status: ACTIVE | Noted: 2018-08-01

## 2018-11-29 ENCOUNTER — TELEPHONE (OUTPATIENT)
Dept: FAMILY MEDICINE CLINIC | Age: 63
End: 2018-11-29

## 2018-11-29 NOTE — TELEPHONE ENCOUNTER
Bryan Gutierrez (spouse) is requesting a call back regarding the \"kidney specialist\".      Best contact:[phone]

## 2019-02-01 PROBLEM — N28.1 RENAL CYST, RIGHT: Status: ACTIVE | Noted: 2019-02-01

## 2019-09-17 ENCOUNTER — TELEPHONE (OUTPATIENT)
Dept: FAMILY MEDICINE CLINIC | Age: 64
End: 2019-09-17

## 2020-09-21 DIAGNOSIS — M79.605 LEFT LEG PAIN: ICD-10-CM

## 2020-09-21 RX ORDER — CYCLOBENZAPRINE HCL 10 MG
10 TABLET ORAL
Qty: 90 TAB | Refills: 0 | Status: SHIPPED | OUTPATIENT
Start: 2020-09-21 | End: 2020-11-05 | Stop reason: SDUPTHER

## 2020-09-21 NOTE — TELEPHONE ENCOUNTER
Requested Prescriptions     Pending Prescriptions Disp Refills    cyclobenzaprine (FLEXERIL) 10 mg tablet 90 Tab 1     Sig: Take 1 Tab by mouth nightly.  As needed     Wants this to be increased to 20 mg

## 2020-10-20 DIAGNOSIS — M79.605 LEFT LEG PAIN: ICD-10-CM

## 2020-10-20 NOTE — TELEPHONE ENCOUNTER
Requested Prescriptions     Pending Prescriptions Disp Refills    traMADoL (ULTRAM) 50 mg tablet 15 Tab 0     Sig: Take 1 Tab by mouth every six (6) hours as needed for Pain for up to 15 doses. Max Daily Amount: 200 mg.

## 2020-10-22 RX ORDER — TRAMADOL HYDROCHLORIDE 50 MG/1
50 TABLET ORAL
Qty: 15 TAB | Refills: 0 | Status: SHIPPED | OUTPATIENT
Start: 2020-10-22 | End: 2020-11-05 | Stop reason: SDUPTHER

## 2021-02-24 ENCOUNTER — TRANSCRIBE ORDER (OUTPATIENT)
Dept: INTERNAL MEDICINE CLINIC | Age: 66
End: 2021-02-24

## 2021-03-08 DIAGNOSIS — G58.9 MONONEUROPATHY: ICD-10-CM

## 2021-03-08 DIAGNOSIS — M79.605 LEFT LEG PAIN: ICD-10-CM

## 2021-03-08 RX ORDER — CYCLOBENZAPRINE HCL 10 MG
10 TABLET ORAL
Qty: 30 TAB | Refills: 0 | Status: SHIPPED | OUTPATIENT
Start: 2021-03-08 | End: 2021-04-27 | Stop reason: SDUPTHER

## 2021-03-08 NOTE — TELEPHONE ENCOUNTER
Requested Prescriptions     Pending Prescriptions Disp Refills    cyclobenzaprine (FLEXERIL) 10 mg tablet 30 Tab 0     Sig: Take 1 Tab by mouth nightly.  As needed     Pt does not have tramadol on his med list.    ----- Message from Zoila Pereira sent at 3/8/2021  9:12 AM EST -----  Regarding: Dr villanueva-Mee/refill  Contact: [phone]  Medication Refill    Caller (if not patient):      Relationship of caller (if not patient):      Best contact number(s):[phone]      Name of medication and dosage if known:Tramadol 50 mg,Cyclobenzaprine 10 mg      Is patient out of this medication (yes/no):yes      [facility]armacy name:[facility] listed in chart? (yes/no):yes  Pharmacy phone number:      Details to clarify the request:this is patients 3rd request       Zoila Pereira

## 2021-04-27 ENCOUNTER — OFFICE VISIT (OUTPATIENT)
Dept: FAMILY MEDICINE CLINIC | Age: 66
End: 2021-04-27
Payer: MEDICARE

## 2021-04-27 VITALS
TEMPERATURE: 98.7 F | HEIGHT: 68 IN | DIASTOLIC BLOOD PRESSURE: 96 MMHG | OXYGEN SATURATION: 96 % | HEART RATE: 76 BPM | SYSTOLIC BLOOD PRESSURE: 158 MMHG | WEIGHT: 160 LBS | BODY MASS INDEX: 24.25 KG/M2 | RESPIRATION RATE: 12 BRPM

## 2021-04-27 DIAGNOSIS — I25.810 CORONARY ARTERY DISEASE INVOLVING CORONARY BYPASS GRAFT OF NATIVE HEART WITHOUT ANGINA PECTORIS: ICD-10-CM

## 2021-04-27 DIAGNOSIS — J44.9 CHRONIC OBSTRUCTIVE PULMONARY DISEASE, UNSPECIFIED COPD TYPE (HCC): ICD-10-CM

## 2021-04-27 DIAGNOSIS — Z00.00 MEDICARE ANNUAL WELLNESS VISIT, SUBSEQUENT: ICD-10-CM

## 2021-04-27 DIAGNOSIS — I25.2 HISTORY OF MI (MYOCARDIAL INFARCTION): ICD-10-CM

## 2021-04-27 DIAGNOSIS — G58.9 MONONEUROPATHY: ICD-10-CM

## 2021-04-27 DIAGNOSIS — Z90.49 HISTORY OF TOTAL COLECTOMY: ICD-10-CM

## 2021-04-27 DIAGNOSIS — M79.605 LEFT LEG PAIN: ICD-10-CM

## 2021-04-27 DIAGNOSIS — I10 ESSENTIAL HYPERTENSION: ICD-10-CM

## 2021-04-27 DIAGNOSIS — Z86.711 HISTORY OF PULMONARY EMBOLUS (PE): ICD-10-CM

## 2021-04-27 DIAGNOSIS — F51.01 PRIMARY INSOMNIA: ICD-10-CM

## 2021-04-27 DIAGNOSIS — F32.89 OTHER DEPRESSION: Primary | ICD-10-CM

## 2021-04-27 DIAGNOSIS — R68.89 OTHER GENERAL SYMPTOMS AND SIGNS: ICD-10-CM

## 2021-04-27 DIAGNOSIS — R11.0 NAUSEA: ICD-10-CM

## 2021-04-27 DIAGNOSIS — K91.2 POSTSURGICAL MALABSORPTION, NOT ELSEWHERE CLASSIFIED: ICD-10-CM

## 2021-04-27 DIAGNOSIS — E55.9 VITAMIN D DEFICIENCY: ICD-10-CM

## 2021-04-27 PROBLEM — I26.99 PE (PULMONARY THROMBOEMBOLISM) (HCC): Status: RESOLVED | Noted: 2018-08-01 | Resolved: 2021-04-27

## 2021-04-27 PROCEDURE — G8536 NO DOC ELDER MAL SCRN: HCPCS | Performed by: FAMILY MEDICINE

## 2021-04-27 PROCEDURE — G8427 DOCREV CUR MEDS BY ELIG CLIN: HCPCS | Performed by: FAMILY MEDICINE

## 2021-04-27 PROCEDURE — G8755 DIAS BP > OR = 90: HCPCS | Performed by: FAMILY MEDICINE

## 2021-04-27 PROCEDURE — 99214 OFFICE O/P EST MOD 30 MIN: CPT | Performed by: FAMILY MEDICINE

## 2021-04-27 PROCEDURE — G9711 PT HX TOT COL OR COLON CA: HCPCS | Performed by: FAMILY MEDICINE

## 2021-04-27 PROCEDURE — G8753 SYS BP > OR = 140: HCPCS | Performed by: FAMILY MEDICINE

## 2021-04-27 PROCEDURE — G8420 CALC BMI NORM PARAMETERS: HCPCS | Performed by: FAMILY MEDICINE

## 2021-04-27 PROCEDURE — 1100F PTFALLS ASSESS-DOCD GE2>/YR: CPT | Performed by: FAMILY MEDICINE

## 2021-04-27 PROCEDURE — 36415 COLL VENOUS BLD VENIPUNCTURE: CPT | Performed by: FAMILY MEDICINE

## 2021-04-27 PROCEDURE — G0439 PPPS, SUBSEQ VISIT: HCPCS | Performed by: FAMILY MEDICINE

## 2021-04-27 PROCEDURE — 3288F FALL RISK ASSESSMENT DOCD: CPT | Performed by: FAMILY MEDICINE

## 2021-04-27 PROCEDURE — G8431 POS CLIN DEPRES SCRN F/U DOC: HCPCS | Performed by: FAMILY MEDICINE

## 2021-04-27 RX ORDER — CYCLOBENZAPRINE HCL 10 MG
10 TABLET ORAL
Qty: 90 TAB | Refills: 0 | Status: SHIPPED | OUTPATIENT
Start: 2021-04-27 | End: 2021-06-08 | Stop reason: SDUPTHER

## 2021-04-27 RX ORDER — SERTRALINE HYDROCHLORIDE 100 MG/1
100 TABLET, FILM COATED ORAL DAILY
Qty: 90 TAB | Refills: 1 | Status: SHIPPED | OUTPATIENT
Start: 2021-04-27 | End: 2021-11-19 | Stop reason: SDUPTHER

## 2021-04-27 RX ORDER — TRAMADOL HYDROCHLORIDE 50 MG/1
50 TABLET ORAL
Qty: 15 TAB | Refills: 0 | Status: SHIPPED | OUTPATIENT
Start: 2021-04-27 | End: 2021-05-28

## 2021-04-27 RX ORDER — METOCLOPRAMIDE 5 MG/1
TABLET ORAL
Qty: 90 TAB | Refills: 1 | Status: SHIPPED | OUTPATIENT
Start: 2021-04-27 | End: 2021-09-05

## 2021-04-27 RX ORDER — CARVEDILOL 6.25 MG/1
6.25 TABLET ORAL 2 TIMES DAILY WITH MEALS
Qty: 60 TAB | Refills: 0 | Status: SHIPPED | OUTPATIENT
Start: 2021-04-27 | End: 2021-06-08 | Stop reason: SDUPTHER

## 2021-04-27 NOTE — PROGRESS NOTES
1. Have you been to the ER, urgent care clinic since your last visit? Hospitalized since your last visit? No    2. Have you seen or consulted any other health care providers outside of the [address] [place] [address] since your last visit? Include any pap smears or colon screening. No  This is the Subsequent Medicare Annual Wellness Exam, performed 12 months or more after the Initial AWV or the last Subsequent AWV    I have reviewed the patient's medical history in detail and updated the computerized patient record. Assessment/Plan   Education and counseling provided:  Are appropriate based on today's review and evaluation    1. Other depression  -     METABOLIC PANEL, COMPREHENSIVE; Future  -     VITAMIN B12; Future  -     VITAMIN D, 25 HYDROXY; Future  2. Chronic obstructive pulmonary disease, unspecified COPD type ([facility] [address]  -     CBC WITH AUTOMATED DIFF; Future  3. Mononeuropathy  4. Primary insomnia  5. History of total colectomy  -     VITAMIN B12; Future  -     VITAMIN D, 25 HYDROXY; Future  6. History of MI (myocardial infarction)  -     LIPID PANEL; Future  7. Other general symptoms and signs   -     VITAMIN B12; Future  8. Postsurgical malabsorption, not elsewhere classified   -     VITAMIN D, 25 HYDROXY; Future  9.  Medicare annual wellness visit, subsequent       Depression Risk Factor Screening     3 most recent PHQ Screens 4/27/2021   PHQ Not Done -   Little interest or pleasure in doing things Several days   Feeling down, depressed, irritable, or hopeless More than half the days   Total Score PHQ 2 3   Trouble falling or staying asleep, or sleeping too much Nearly every day   Feeling tired or having little energy More than half the days   Poor appetite, weight loss, or overeating Nearly every day   Feeling bad about yourself - or that you are a failure or have let yourself or your family down Nearly every day   Trouble concentrating on things such as school, work, reading, or watching TV Not at all Moving or speaking so slowly that other people could have noticed; or the opposite being so fidgety that others notice Not at all   Thoughts of being better off dead, or hurting yourself in some way Several days   PHQ 9 Score 15   How difficult have these problems made it for you to do your work, take care of your home and get along with others -       Alcohol Risk Screen    Do you average more than 1 drink per night or more than 7 drinks a week: No    In the past three months have you have had more than 4 drinks containing alcohol on one occasion: No        Functional Ability and Level of Safety    Hearing: Hearing is good. Activities of Daily Living: The home contains: no safety equipment. Patient does total self care      Ambulation: with no difficulty     Fall Risk:  Fall Risk Assessment, last 12 mths 7/27/2020   Able to walk? Yes   Fall in past 12 months? Yes   Number of falls in past 12 months 1   Fall with injury? 0      Abuse Screen:  Patient is not abused       Cognitive Screening    Has your family/caregiver stated any concerns about your memory: no     Cognitive Screening: na    Health Maintenance Due     Health Maintenance Due   Topic Date Due    COVID-19 Vaccine (1) Never done    Shingrix Vaccine Age 50> (1 of 2) Never done       Functional Ability:   Does the patient exhibit a steady gait? yes   How long did it take the patient to get up and walk from a sitting position? 2sec   Is the patient self reliant?  (ie can do own laundry, meals, household chores)  yes     Does the patient handle his/her own medications? yes     Does the patient handle his/her own money? yes     Is the patients home safe (ie good lighting, handrails on stairs and bath, etc.)? yes     Did you notice or did patient express any hearing difficulties? no     Did you notice or did patient express any vision difficulties?   no     Were distance and reading eye charts used?   no       Advance Care Planning:   Patient was offered the opportunity to discuss advance care planning:  yes     Does patient have an Advance Directive:  no   If no, did you provide information on Caring Connections? yes     ADL Assessment 7/27/2020   Feeding yourself No Help Needed   Getting from bed to chair No Help Needed   Getting dressed No Help Needed   Bathing or showering No Help Needed   Walk across the room (includes cane/walker) No Help Needed   Using the telphone No Help Needed   Taking your medications No Help Needed   Preparing meals Help Needed   Managing money (expenses/bills) No Help Needed   Moderately strenuous housework (laundry) Help Needed   Shopping for personal items (toiletries/medicines) Help Needed   Shopping for groceries Help Needed   Driving No Help Needed   Climbing a flight of stairs Help Needed   Getting to places beyond walking distances Help Needed       Abuse Screening Questionnaire 3/9/2020   Do you ever feel afraid of your partner? N   Are you in a relationship with someone who physically or mentally threatens you? N   Is it safe for you to go home?  Y         Patient Care Team   Patient Care Team:  Eric Aguiar MD as PCP - General (Family Medicine)  Eric Aguiar MD as PCP - [facility] Empaneled Provider    History     Patient Active Problem List   Diagnosis Code    Hypertension I10    Hyperlipidemia N30.3    Metabolic syndrome Y29.27    Prediabetes R73.03    COPD (chronic obstructive pulmonary disease) ([facility] [address] J44.9    PAD (peripheral artery disease) ([facility] [address] I73.9    History of MI (myocardial infarction) I25.2    Mononeuropathy G58.9    DVT (deep vein thrombosis) in pregnancy O22.30    Renal cyst, right N28.1     Past Medical History:   Diagnosis Date    Bilateral hip pain     Colon polyps 2013    COPD (chronic obstructive pulmonary disease) ([facility] [address] 04/2017    moderate    Depression     Diverticulitis     ED (erectile dysfunction)     Enteritis 03/2018    Essential hypertension, benign 2006    Hernia of abdominal wall 2017    History of DVT (deep vein thrombosis) 08/2018    History of MI (myocardial infarction) 11/10/2016    History of pulmonary embolus (PE) 08/2018    Hypercholesterolemia     IGT (impaired glucose tolerance)     Inguinal hernia 2017    (R)    Irritable bowel syndrome (IBS) 03/2018    Mononeuropathy     femoral nerve left    Paresthesia of left foot     Renal cyst     Smoker       Past Surgical History:   Procedure Laterality Date    ENDOSCOPY, COLON, DIAGNOSTIC  2014    HX COLONOSCOPY      w\ polyps    HX CORONARY ARTERY BYPASS GRAFT      HX CORONARY ARTERY BYPASS GRAFT  11/21/2016    4 vessel    HX HERNIA REPAIR  04/9010    paraumbilical, 2 hernias    HX OTHER SURGICAL  [id number]    nl stress myoview    HX OTHER SURGICAL      Aortic abdominal bypass    HX OTHER SURGICAL  03/2019    Cystoscopy    HX SMALL BOWEL RESECTION  08/2018    13 inches    HX TOTAL COLECTOMY  08/2018    total     Current Outpatient Medications   Medication Sig Dispense Refill    cyclobenzaprine (FLEXERIL) 10 mg tablet Take 1 Tab by mouth nightly. As needed 30 Tab 0    sertraline (ZOLOFT) 50 mg tablet TAKE 1 TABLET BY MOUTH DAILY 90 Tab 0    carvediloL (Coreg) 3.125 mg tablet Take 1 Tab by mouth two (2) times daily (with meals). 30 Tab 0    traZODone (DESYREL) 100 mg tablet Take 1 Tab by mouth nightly. (Patient taking differently: Take 100 mg by mouth as needed.) 30 Tab 0    ondansetron (ZOFRAN ODT) 4 mg disintegrating tablet 1 TABLET BY MOUTH EVERY 8 HOURS AS NEEDED FOR NAUSEA d/t small bowel resection and total Colectomy 180 Tab 0    rivaroxaban (XARELTO) 20 mg tab tablet Take 1 Tab by mouth daily (with breakfast). 90 Tab 1    metoclopramide HCl (REGLAN) 5 mg tablet Take one tid with meals if needed 90 Tab 1    gabapentin (NEURONTIN) 600 mg tablet Take 3 at night if needed 270 Tab 0    cyanocobalamin (VITAMIN B-12) 1,000 mcg tablet Take 1,000 mcg by mouth daily.  Taking 500mcg      lactose-reduced food (ENSURE COMPLETE PO) Take 1 Can by mouth.  simethicone (MYLICON) 80 mg chewable tablet Take 80 mg by mouth as needed.        Allergies   Allergen Reactions    Amitriptyline Rash    Simvastatin Myalgia     Pt takes this, says he is not allergic       Family History   Problem Relation Age of Onset    Cancer Sister         cervical    Hypertension Brother      Social History     Tobacco Use    Smoking status: Former Smoker     Years: 46.00     Types: Cigarettes    Smokeless tobacco: Former User     Quit date: 11/20/2016    Tobacco comment: smokes a few   Substance Use Topics    Alcohol use: No     Comment: rare         Ron Tillman LPN

## 2021-04-27 NOTE — PROGRESS NOTES
Meño العلي is a 72 y.o. male who presents to the office today with the following:  Chief Complaint   Patient presents with    Medication Refill    Annual Wellness Visit            HPI  Hx of Pancolitis  S/p colectomy  Has increased weight to 160  Eating one meal a day d/t getting bloated    Hx of PE and MI    COPD  occ has SOB  On long walks  Still smoking occ when nervous    Depression  On Sertraline  Taking 100 mg now  Helping some  Still has night larson  Taking Trazodone not often, not helping much    Taking Flexeril 20 mg at night    Neuropathy  Hardly ever taking Gabapentin now  But occ taking the pain med  Would like a refill    Review of Systems   Constitutional: Negative for weight loss. Respiratory: Positive for shortness of breath (occ with long walks). Negative for cough. Cardiovascular: Negative for chest pain. Psychiatric/Behavioral: Positive for depression. Negative for hallucinations, memory loss, substance abuse and suicidal ideas. The patient is nervous/anxious. The patient does not have insomnia. See HPI.     Past Medical History:   Diagnosis Date    Bilateral hip pain     Colon polyps 2013    COPD (chronic obstructive pulmonary disease) ([facility] [address] 04/2017    moderate    Depression     Diverticulitis     ED (erectile dysfunction)     Enteritis 03/2018    Essential hypertension, benign 2006    Hernia of abdominal wall 2017    History of DVT (deep vein thrombosis) 08/2018    History of MI (myocardial infarction) 11/10/2016    History of pulmonary embolus (PE) 08/2018    Hypercholesterolemia     IGT (impaired glucose tolerance)     Inguinal hernia 2017    (R)    Irritable bowel syndrome (IBS) 03/2018    Mononeuropathy     femoral nerve left    Paresthesia of left foot     Renal cyst     Smoker        Past Surgical History:   Procedure Laterality Date    ENDOSCOPY, COLON, DIAGNOSTIC  2014    HX COLONOSCOPY      w\ polyps    HX CORONARY ARTERY BYPASS GRAFT      HX CORONARY ARTERY BYPASS GRAFT  11/21/2016    4 vessel    HX HERNIA REPAIR  11/5325    paraumbilical, 2 hernias    HX OTHER SURGICAL  56\[id number]    nl stress myoview    HX OTHER SURGICAL      Aortic abdominal bypass    HX OTHER SURGICAL  03/2019    Cystoscopy    HX SMALL BOWEL RESECTION  08/2018    13 inches    HX TOTAL COLECTOMY  08/2018    total       Allergies   Allergen Reactions    Amitriptyline Rash    Simvastatin Myalgia     Pt takes this, says he is not allergic       Current Outpatient Medications   Medication Sig    sertraline (ZOLOFT) 100 mg tablet Take 1 Tab by mouth daily.  cyclobenzaprine (FLEXERIL) 10 mg tablet Take 1 Tab by mouth nightly. As needed    rivaroxaban (XARELTO) 20 mg tab tablet Take 1 Tab by mouth daily (with breakfast).  metoclopramide HCl (REGLAN) 5 mg tablet Take one tid with meals if needed    traMADoL (ULTRAM) 50 mg tablet Take 1 Tab by mouth every six (6) hours as needed for Pain for up to 15 doses. Max Daily Amount: 200 mg.  carvediloL (COREG) 6.25 mg tablet Take 1 Tab by mouth two (2) times daily (with meals).  traZODone (DESYREL) 100 mg tablet Take 1 Tab by mouth nightly. (Patient taking differently: Take 100 mg by mouth as needed.)    ondansetron (ZOFRAN ODT) 4 mg disintegrating tablet 1 TABLET BY MOUTH EVERY 8 HOURS AS NEEDED FOR NAUSEA d/t small bowel resection and total Colectomy    gabapentin (NEURONTIN) 600 mg tablet Take 3 at night if needed    cyanocobalamin (VITAMIN B-12) 1,000 mcg tablet Take 1,000 mcg by mouth daily. Taking 500mcg    lactose-reduced food (ENSURE COMPLETE PO) Take 1 Can by mouth.  simethicone (MYLICON) 80 mg chewable tablet Take 80 mg by mouth as needed. No current facility-administered medications for this visit.         Social History     Socioeconomic History    Marital status:      Spouse name: Not on file    Number of children: Not on file    Years of education: Not on file    Highest education level: Not on file   Tobacco Use    Smoking status: Former Smoker     Years: 46.00     Types: Cigarettes    Smokeless tobacco: Former User     Quit date: 11/20/2016    Tobacco comment: smokes a few   Substance and Sexual Activity    Alcohol use: No     Comment: rare    Drug use: No    Sexual activity: Yes     Partners: Female   Other Topics Concern    Blood Transfusions No    Sleep Concern Yes     Comment: Works swing shift    Exercise Yes    Seat Belt Yes    Self-Exams Yes       Family History   Problem Relation Age of Onset    Cancer Sister         cervical    Hypertension Brother          Physical Exam:  Visit Vitals  BP (!) 158/96 (BP 1 Location: Left upper arm, BP Patient Position: At rest)   Pulse 76   Temp 98.7 °F (37.1 °C) (Oral)   Resp 12   Ht 5' 8\" (1.727 m)   Wt 160 lb (72.6 kg)   SpO2 96%   BMI 24.33 kg/m²     Physical Exam  Vitals signs and nursing note reviewed. Constitutional:       Appearance: He is normal weight. HENT:      Head: Normocephalic and atraumatic. Right Ear: There is impacted cerumen. Left Ear: There is impacted cerumen. Eyes:      Extraocular Movements: Extraocular movements intact. Conjunctiva/sclera: Conjunctivae normal.   Cardiovascular:      Rate and Rhythm: Normal rate and regular rhythm. Pulses: Normal pulses. Pulmonary:      Effort: Pulmonary effort is normal.      Breath sounds: Normal breath sounds. Abdominal:      General: There is distension (d/t hernia). Palpations: Abdomen is soft. Tenderness: There is no abdominal tenderness. There is no right CVA tenderness, left CVA tenderness or guarding. Musculoskeletal:      Right lower leg: No edema. Left lower leg: No edema. Lymphadenopathy:      Cervical: No cervical adenopathy. Neurological:      Mental Status: He is alert and oriented to person, place, and time.    Psychiatric:         Mood and Affect: Mood normal.         Behavior: Behavior normal. Assessment/Plan:    ICD-10-CM ICD-9-CM    1. Other depression  M80.94 296 METABOLIC PANEL, COMPREHENSIVE      VITAMIN B12      VITAMIN D, 25 HYDROXY      sertraline (ZOLOFT) 100 mg tablet      VITAMIN D, 25 HYDROXY      VITAMIN T40      METABOLIC PANEL, COMPREHENSIVE   2. Chronic obstructive pulmonary disease, unspecified COPD type (HCC)  J44.9 496 CBC WITH AUTOMATED DIFF      CBC WITH AUTOMATED DIFF   3. Mononeuropathy  G58.9 355.9 cyclobenzaprine (FLEXERIL) 10 mg tablet      traMADoL (ULTRAM) 50 mg tablet   4. Primary insomnia  F51.01 307.42    5. History of total colectomy  Z90.49 V45.89 VITAMIN B12      VITAMIN D, 25 HYDROXY      VITAMIN D, 25 HYDROXY      VITAMIN B12   6. History of MI (myocardial infarction)  I25.2 412 LIPID PANEL      LIPID PANEL   7. Other general symptoms and signs   R68.89 780.99 VITAMIN B12      VITAMIN B12   8. Postsurgical malabsorption, not elsewhere classified   K91.2 579.3 VITAMIN D, 25 HYDROXY      VITAMIN D, 25 HYDROXY   9. Medicare annual wellness visit, subsequent  Z00.00 V70.0    10. Left leg pain  M79.605 729.5 cyclobenzaprine (FLEXERIL) 10 mg tablet      traMADoL (ULTRAM) 50 mg tablet   11. Coronary artery disease involving coronary bypass graft of native heart without angina pectoris  I25.810 414.05 COLLECTION VENOUS BLOOD,VENIPUNCTURE      COLLECTION VENOUS BLOOD,VENIPUNCTURE   12. History of pulmonary embolus (PE)  Z86.711 V12.55 rivaroxaban (XARELTO) 20 mg tab tablet   13. Nausea  R11.0 787.02 metoclopramide HCl (REGLAN) 5 mg tablet   14. Essential hypertension  I10 401.9 carvediloL (COREG) 6.25 mg tablet     Coreg increased to 6.25 mg d/t high BP  Recheck in 1 mo  And monitor at home    Follow-up and Dispositions    · Return in about 4 weeks (around 5/25/2021).          Kirsten Elizabeth MD

## 2021-04-27 NOTE — PATIENT INSTRUCTIONS
Medicare Wellness Visit, Male The best way to live healthy is to have a lifestyle where you eat a well-balanced diet, exercise regularly, limit alcohol use, and quit all forms of tobacco/nicotine, if applicable. Regular preventive services are another way to keep healthy. Preventive services (vaccines, screening tests, monitoring & exams) can help personalize your care plan, which helps you manage your own care. Screening tests can find health problems at the earliest stages, when they are easiest to treat. [facility] follows the current, evidence-based guidelines published by the [facility] Laura ([facility]) when recommending preventive services for our patients. Because we follow these guidelines, sometimes recommendations change over time as research supports it. (For example, a prostate screening blood test is no longer routinely recommended for men with no symptoms). Of course, you and your doctor may decide to screen more often for some diseases, based on your risk and co-morbidities (chronic disease you are already diagnosed with). Preventive services for you include: - Medicare offers their members a free annual wellness visit, which is time for you and your primary care provider to discuss and plan for your preventive service needs. Take advantage of this benefit every year! 
-All adults over age 72 should receive the recommended pneumonia vaccines. Current USPSTF guidelines recommend a series of two vaccines for the best pneumonia protection.  
-All adults should have a flu vaccine yearly and tetanus vaccine every 10 years. 
-All adults age 48 and older should receive the shingles vaccines (series of two vaccines).       
-All adults age 38-68 who are overweight should have a diabetes screening test once every three years.  
-Other screening tests & preventive services for persons with diabetes include: an eye exam to screen for diabetic retinopathy, a kidney function test, a foot exam, and stricter control over your cholesterol.  
-Cardiovascular screening for adults with routine risk involves an electrocardiogram (ECG) at intervals determined by the provider.  
-Colorectal cancer screening should be done for adults age 54-65 with no increased risk factors for colorectal cancer. There are a number of acceptable methods of screening for this type of cancer. Each test has its own benefits and drawbacks. Discuss with your provider what is most appropriate for you during your annual wellness visit. The different tests include: colonoscopy (considered the best screening method), a fecal occult blood test, a fecal DNA test, and sigmoidoscopy. 
-All adults born between [facility] should be screened once for Hepatitis C. 
-An Abdominal Aortic Aneurysm (AAA) Screening is recommended for men age 73-68 who has ever smoked in their lifetime. Here is a list of your current Health Maintenance items (your personalized list of preventive services) with a due date: 
Health Maintenance Due Topic Date Due  
 COVID-19 Vaccine (1) Never done  Shingles Vaccine (1 of 2) Never done

## 2021-04-27 NOTE — ACP (ADVANCE CARE PLANNING)
Non-Provider Advance Care Planning (ACP) Note    Date of ACP Conversation: 4/27/2021  Persons included in Conversation: patient  Length of ACP Conversation in minutes: <16 minutes (Non-Billable)    Conversation requested by:   Provider    Authorized Decision Maker (if patient is incapable of making informed decisions):    This person is:  Next of Kin by law (only applies in absence of a Healthcare Power of  or Legal Guardian)        General ACP for ALL Patients with Decision Making Capacity:    Advance Directive Conversation with Patients who have not yet planned:  Importance of advance care planning, including choosing a healthcare agent to communicate patient's healthcare decisions if patient lost the ability to make decisions, such as after a sudden illness or accident    Review of Existing Advance Directive: (Select questions covered)  na    Interventions Provided:  Provided ACP educational materials:  Conversation Starter Kit  Advance Directive Form

## 2021-04-28 LAB
25(OH)D3 SERPL-MCNC: 10.7 NG/ML (ref 30–100)
ALBUMIN SERPL-MCNC: 3.8 G/DL (ref 3.5–5)
ALBUMIN/GLOB SERPL: 1.2 {RATIO} (ref 1.1–2.2)
ALP SERPL-CCNC: 105 U/L (ref 45–117)
ALT SERPL-CCNC: 34 U/L (ref 12–78)
ANION GAP SERPL CALC-SCNC: 6 MMOL/L (ref 5–15)
AST SERPL-CCNC: 29 U/L (ref 15–37)
BASOPHILS # BLD: 0.1 K/UL (ref 0–0.1)
BASOPHILS NFR BLD: 1 % (ref 0–1)
BILIRUB SERPL-MCNC: 0.3 MG/DL (ref 0.2–1)
BUN SERPL-MCNC: 21 MG/DL (ref 6–20)
BUN/CREAT SERPL: 17 (ref 12–20)
CALCIUM SERPL-MCNC: 9 MG/DL (ref 8.5–10.1)
CHLORIDE SERPL-SCNC: 107 MMOL/L (ref 97–108)
CHOLEST SERPL-MCNC: 198 MG/DL
CO2 SERPL-SCNC: 25 MMOL/L (ref 21–32)
CREAT SERPL-MCNC: 1.27 MG/DL (ref 0.7–1.3)
DIFFERENTIAL METHOD BLD: ABNORMAL
EOSINOPHIL # BLD: 0.3 K/UL (ref 0–0.4)
EOSINOPHIL NFR BLD: 3 % (ref 0–7)
ERYTHROCYTE [DISTWIDTH] IN BLOOD BY AUTOMATED COUNT: 13.9 % (ref 11.5–14.5)
GLOBULIN SER CALC-MCNC: 3.1 G/DL (ref 2–4)
GLUCOSE SERPL-MCNC: 81 MG/DL (ref 65–100)
HCT VFR BLD AUTO: 53.9 % (ref 36.6–50.3)
HDLC SERPL-MCNC: 43 MG/DL
HDLC SERPL: 4.6 {RATIO} (ref 0–5)
HGB BLD-MCNC: 16.9 G/DL (ref 12.1–17)
IMM GRANULOCYTES # BLD AUTO: 0 K/UL (ref 0–0.04)
IMM GRANULOCYTES NFR BLD AUTO: 0 % (ref 0–0.5)
LDLC SERPL CALC-MCNC: ABNORMAL MG/DL (ref 0–100)
LDLC SERPL DIRECT ASSAY-MCNC: 109 MG/DL (ref 0–100)
LIPID PROFILE,FLP: ABNORMAL
LYMPHOCYTES # BLD: 1.9 K/UL (ref 0.8–3.5)
LYMPHOCYTES NFR BLD: 20 % (ref 12–49)
MCH RBC QN AUTO: 29.8 PG (ref 26–34)
MCHC RBC AUTO-ENTMCNC: 31.4 G/DL (ref 30–36.5)
MCV RBC AUTO: 94.9 FL (ref 80–99)
MONOCYTES # BLD: 1 K/UL (ref 0–1)
MONOCYTES NFR BLD: 11 % (ref 5–13)
NEUTS SEG # BLD: 6 K/UL (ref 1.8–8)
NEUTS SEG NFR BLD: 65 % (ref 32–75)
NRBC # BLD: 0 K/UL (ref 0–0.01)
NRBC BLD-RTO: 0 PER 100 WBC
PLATELET # BLD AUTO: 174 K/UL (ref 150–400)
PMV BLD AUTO: 11.3 FL (ref 8.9–12.9)
POTASSIUM SERPL-SCNC: 4.6 MMOL/L (ref 3.5–5.1)
PROT SERPL-MCNC: 6.9 G/DL (ref 6.4–8.2)
RBC # BLD AUTO: 5.68 M/UL (ref 4.1–5.7)
SODIUM SERPL-SCNC: 138 MMOL/L (ref 136–145)
TRIGL SERPL-MCNC: 560 MG/DL (ref ?–150)
VIT B12 SERPL-MCNC: 587 PG/ML (ref 193–986)
VLDLC SERPL CALC-MCNC: ABNORMAL MG/DL
WBC # BLD AUTO: 9.2 K/UL (ref 4.1–11.1)

## 2021-04-28 RX ORDER — ERGOCALCIFEROL 1.25 MG/1
50000 CAPSULE ORAL
Qty: 12 CAP | Refills: 1 | Status: SHIPPED | OUTPATIENT
Start: 2021-04-28 | End: 2021-06-08

## 2021-04-28 NOTE — PROGRESS NOTES
Spoke with patient after verifying Name, and , informed patient of lab results and recommendations per Dr. Gabriela García . Patient given an opportunity to ask questions, repeated information, and verbalized understanding. Patient states he will  new prescription today.

## 2021-04-28 NOTE — PROGRESS NOTES
Call pt, the Vit D is very low, I am calling in a high dose to take once a week and recheck in 3 mo  The B12 is normal  The CBC is ok  The electrolytes, kidney and liver tests are ok  The Chol is good except the Triglycerides are very high, avoid junk foods and recheck FASTING in 3 mo

## 2021-05-17 DIAGNOSIS — Z86.711 HISTORY OF PULMONARY EMBOLUS (PE): ICD-10-CM

## 2021-05-17 NOTE — TELEPHONE ENCOUNTER
Pt has run out of his   Requested Prescriptions     Pending Prescriptions Disp Refills    rivaroxaban (XARELTO) 20 mg tab tablet 90 Tab 1     Sig: Take 1 Tab by mouth daily (with breakfast).

## 2021-06-08 ENCOUNTER — OFFICE VISIT (OUTPATIENT)
Dept: FAMILY MEDICINE CLINIC | Age: 66
End: 2021-06-08
Payer: COMMERCIAL

## 2021-06-08 VITALS
BODY MASS INDEX: 23.11 KG/M2 | OXYGEN SATURATION: 97 % | RESPIRATION RATE: 16 BRPM | TEMPERATURE: 97.5 F | SYSTOLIC BLOOD PRESSURE: 136 MMHG | DIASTOLIC BLOOD PRESSURE: 76 MMHG | HEIGHT: 68 IN | HEART RATE: 72 BPM | WEIGHT: 152.5 LBS

## 2021-06-08 DIAGNOSIS — M79.605 LEFT LEG PAIN: ICD-10-CM

## 2021-06-08 DIAGNOSIS — I10 ESSENTIAL HYPERTENSION: ICD-10-CM

## 2021-06-08 DIAGNOSIS — G58.9 MONONEUROPATHY: ICD-10-CM

## 2021-06-08 PROCEDURE — 99213 OFFICE O/P EST LOW 20 MIN: CPT | Performed by: FAMILY MEDICINE

## 2021-06-08 PROCEDURE — G8510 SCR DEP NEG, NO PLAN REQD: HCPCS | Performed by: FAMILY MEDICINE

## 2021-06-08 PROCEDURE — G8427 DOCREV CUR MEDS BY ELIG CLIN: HCPCS | Performed by: FAMILY MEDICINE

## 2021-06-08 PROCEDURE — G8754 DIAS BP LESS 90: HCPCS | Performed by: FAMILY MEDICINE

## 2021-06-08 PROCEDURE — G8752 SYS BP LESS 140: HCPCS | Performed by: FAMILY MEDICINE

## 2021-06-08 PROCEDURE — 1100F PTFALLS ASSESS-DOCD GE2>/YR: CPT | Performed by: FAMILY MEDICINE

## 2021-06-08 PROCEDURE — G9711 PT HX TOT COL OR COLON CA: HCPCS | Performed by: FAMILY MEDICINE

## 2021-06-08 PROCEDURE — 3288F FALL RISK ASSESSMENT DOCD: CPT | Performed by: FAMILY MEDICINE

## 2021-06-08 PROCEDURE — G8536 NO DOC ELDER MAL SCRN: HCPCS | Performed by: FAMILY MEDICINE

## 2021-06-08 PROCEDURE — G8420 CALC BMI NORM PARAMETERS: HCPCS | Performed by: FAMILY MEDICINE

## 2021-06-08 RX ORDER — MELATONIN
DAILY
COMMUNITY

## 2021-06-08 RX ORDER — TRAMADOL HYDROCHLORIDE 50 MG/1
50 TABLET ORAL
Qty: 15 TABLET | Refills: 0 | Status: SHIPPED | OUTPATIENT
Start: 2021-06-08 | End: 2021-08-10 | Stop reason: SDUPTHER

## 2021-06-08 RX ORDER — CARVEDILOL 6.25 MG/1
6.25 TABLET ORAL 2 TIMES DAILY WITH MEALS
Qty: 60 TABLET | Refills: 5 | Status: SHIPPED | OUTPATIENT
Start: 2021-06-08 | End: 2022-02-24 | Stop reason: SDUPTHER

## 2021-06-08 RX ORDER — TRAMADOL HYDROCHLORIDE 50 MG/1
50 TABLET ORAL
COMMUNITY
End: 2021-06-08 | Stop reason: SDUPTHER

## 2021-06-08 RX ORDER — CYCLOBENZAPRINE HCL 10 MG
10 TABLET ORAL
Qty: 90 TABLET | Refills: 0 | Status: SHIPPED | OUTPATIENT
Start: 2021-06-08 | End: 2021-11-23 | Stop reason: SDUPTHER

## 2021-06-08 NOTE — PROGRESS NOTES
Ramila Leigh is a 77 y.o. male who presents to the office today with the following:  Chief Complaint   Patient presents with    Hypertension    Leg Pain     Left Leg pain        HPI   Vit D def  Taking 1000U a day    HTN  Coreg increased to 6.25 mg bid last visit  No SE  BP at home good 140/80    Hyperlipidemia    Leg pain  Taking Flexeril every other night  Helping rosamaria at night  Rarely takes Trazodone    Taking Sertraline every day    Needs refill of Tramadol  Taking it when pain 10/10  Usually pain 8/10  Not taking Gabapentin now    Taking Reglan once a day  Has been good  No nausea    Review of Systems   Constitutional: Positive for weight loss. HENT: Negative for congestion. Respiratory: Negative for cough. Cardiovascular: Negative for chest pain. Gastrointestinal: Positive for abdominal pain (comes and goes). Negative for blood in stool, melena and nausea. Musculoskeletal: Negative for back pain. Left leg pain   Neurological: Negative for dizziness and headaches. Endo/Heme/Allergies: Does not bruise/bleed easily. Psychiatric/Behavioral: Negative for depression and suicidal ideas. The patient is not nervous/anxious. See HPI.     Past Medical History:   Diagnosis Date    Bilateral hip pain     Colon polyps 2013    COPD (chronic obstructive pulmonary disease) ([facility] [address] 04/2017    moderate    Depression     Diverticulitis     ED (erectile dysfunction)     Enteritis 03/2018    Essential hypertension, benign 2006    Hernia of abdominal wall 2017    History of DVT (deep vein thrombosis) 08/2018    History of MI (myocardial infarction) 11/10/2016    History of pulmonary embolus (PE) 08/2018    Hypercholesterolemia     IGT (impaired glucose tolerance)     Inguinal hernia 2017    (R)    Irritable bowel syndrome (IBS) 03/2018    Mononeuropathy     femoral nerve left    Paresthesia of left foot     Renal cyst     Smoker     Vitamin D deficiency 04/2021       Past Surgical History:   Procedure Laterality Date    ENDOSCOPY, COLON, DIAGNOSTIC  2014    HX COLONOSCOPY      w\ polyps    HX CORONARY ARTERY BYPASS GRAFT      HX CORONARY ARTERY BYPASS GRAFT  11/21/2016    4 vessel    HX HERNIA REPAIR  62/5492    paraumbilical, 2 hernias    HX OTHER SURGICAL  26\56\7217    nl stress myoview    HX OTHER SURGICAL      Aortic abdominal bypass    HX OTHER SURGICAL  03/2019    Cystoscopy    HX SMALL BOWEL RESECTION  08/2018    13 inches    HX TOTAL COLECTOMY  08/2018    total       Allergies   Allergen Reactions    Amitriptyline Rash    Simvastatin Myalgia     Pt takes this, says he is not allergic       Current Outpatient Medications   Medication Sig    cholecalciferol (VITAMIN D3) (1000 Units /25 mcg) tablet Take  by mouth daily.  traMADoL (ULTRAM) 50 mg tablet Take 1 Tablet by mouth every six (6) hours as needed for Pain for up to 30 days. Max Daily Amount: 200 mg.  cyclobenzaprine (FLEXERIL) 10 mg tablet Take 1 Tablet by mouth nightly. As needed    carvediloL (COREG) 6.25 mg tablet Take 1 Tablet by mouth two (2) times daily (with meals).  sertraline (ZOLOFT) 100 mg tablet Take 1 Tab by mouth daily.  rivaroxaban (XARELTO) 20 mg tab tablet Take 1 Tab by mouth daily (with breakfast).  metoclopramide HCl (REGLAN) 5 mg tablet Take one tid with meals if needed    traZODone (DESYREL) 100 mg tablet Take 1 Tab by mouth nightly. (Patient taking differently: Take 100 mg by mouth as needed.)    cyanocobalamin (VITAMIN B-12) 1,000 mcg tablet Take 1,000 mcg by mouth daily. Taking 500mcg    lactose-reduced food (ENSURE COMPLETE PO) Take 1 Can by mouth.     ondansetron (ZOFRAN ODT) 4 mg disintegrating tablet 1 TABLET BY MOUTH EVERY 8 HOURS AS NEEDED FOR NAUSEA d/t small bowel resection and total Colectomy (Patient not taking: Reported on 6/8/2021)    gabapentin (NEURONTIN) 600 mg tablet Take 3 at night if needed (Patient not taking: Reported on 6/8/2021)     No current facility-administered medications for this visit. Social History     Socioeconomic History    Marital status:      Spouse name: Not on file    Number of children: Not on file    Years of education: Not on file    Highest education level: Not on file   Tobacco Use    Smoking status: Former Smoker     Years: 46.00     Types: Cigarettes    Smokeless tobacco: Former User     Quit date: 11/20/2016    Tobacco comment: smokes a few   Vaping Use    Vaping Use: Never used   Substance and Sexual Activity    Alcohol use: No     Comment: rare    Drug use: No    Sexual activity: Yes     Partners: Female   Other Topics Concern    Blood Transfusions No    Sleep Concern Yes     Comment: Works swing shift    Exercise Yes   Arion Yes    Self-Exams Yes     Social Determinants of Health     Financial Resource Strain:     Difficulty of Paying Living Expenses:    Food Insecurity:     Worried About Running Out of Food in the Last Year:     Ran Out of Food in the Last Year:    Transportation Needs:     Lack of Transportation (Medical):  Lack of Transportation (Non-Medical):    Physical Activity:     Days of Exercise per Week:     Minutes of Exercise per Session:    Stress:     Feeling of Stress :    Social Connections:     Frequency of Communication with Friends and Family:     Frequency of Social Gatherings with Friends and Family:     Attends Baptism Services:     Active Member of Clubs or Organizations:     Attends Club or Organization Meetings:     Marital Status:        Family History   Problem Relation Age of Onset    Cancer Sister         cervical    Hypertension Brother          Physical Exam:  Visit Vitals  /76   Pulse 72   Temp 97.5 °F (36.4 °C) (Oral)   Resp 16   Ht 5' 8\" (1.727 m)   Wt 152 lb 8 oz (69.2 kg)   SpO2 97%   BMI 23.19 kg/m²     Physical Exam  Vitals and nursing note reviewed. Constitutional:       Appearance: He is normal weight.    HENT:      Head: Normocephalic and atraumatic. Eyes:      Extraocular Movements: Extraocular movements intact. Conjunctiva/sclera: Conjunctivae normal.   Cardiovascular:      Rate and Rhythm: Normal rate and regular rhythm. Heart sounds: Normal heart sounds. Pulmonary:      Effort: Pulmonary effort is normal.      Breath sounds: Normal breath sounds. Musculoskeletal:      Right lower leg: No edema. Left lower leg: No edema. Lymphadenopathy:      Cervical: No cervical adenopathy. Neurological:      Mental Status: He is alert. Psychiatric:         Mood and Affect: Mood normal.         Behavior: Behavior normal.         Assessment/Plan:    ICD-10-CM ICD-9-CM    1. Left leg pain  M79.605 729.5 traMADoL (ULTRAM) 50 mg tablet      cyclobenzaprine (FLEXERIL) 10 mg tablet   2. Mononeuropathy  G58.9 355.9 cyclobenzaprine (FLEXERIL) 10 mg tablet   3. Essential hypertension  I10 401.9 carvediloL (COREG) 6.25 mg tablet       Follow-up and Dispositions    · Return in about 6 weeks (around 7/20/2021).      recheck in 6 w for Vit D and Chol check      Jacek Rodriguez MD

## 2021-06-08 NOTE — PROGRESS NOTES
1. Have you been to the ER, urgent care clinic since your last visit? Hospitalized since your last visit? No    2. Have you seen or consulted any other health care providers outside of the [address] [place] [address] since your last visit? Include any pap smears or colon screening.  No

## 2021-08-10 ENCOUNTER — OFFICE VISIT (OUTPATIENT)
Dept: FAMILY MEDICINE CLINIC | Age: 66
End: 2021-08-10
Payer: MEDICARE

## 2021-08-10 VITALS
BODY MASS INDEX: 23.7 KG/M2 | TEMPERATURE: 98 F | WEIGHT: 160 LBS | OXYGEN SATURATION: 97 % | HEART RATE: 82 BPM | SYSTOLIC BLOOD PRESSURE: 150 MMHG | RESPIRATION RATE: 14 BRPM | DIASTOLIC BLOOD PRESSURE: 90 MMHG | HEIGHT: 69 IN

## 2021-08-10 DIAGNOSIS — E78.1 HIGH TRIGLYCERIDES: ICD-10-CM

## 2021-08-10 DIAGNOSIS — I10 ESSENTIAL HYPERTENSION: ICD-10-CM

## 2021-08-10 DIAGNOSIS — E55.9 VITAMIN D DEFICIENCY: ICD-10-CM

## 2021-08-10 DIAGNOSIS — M79.605 LEFT LEG PAIN: Primary | ICD-10-CM

## 2021-08-10 PROCEDURE — 99214 OFFICE O/P EST MOD 30 MIN: CPT | Performed by: FAMILY MEDICINE

## 2021-08-10 RX ORDER — TRAMADOL HYDROCHLORIDE 50 MG/1
50 TABLET ORAL
Qty: 15 TABLET | Refills: 0 | Status: SHIPPED | OUTPATIENT
Start: 2021-08-10 | End: 2021-09-09

## 2021-08-10 NOTE — PROGRESS NOTES
1. Have you been to the ER, urgent care clinic since your last visit? Hospitalized since your last visit? No    2. Have you seen or consulted any other health care providers outside of the [address] [place] [address] since your last visit? Include any pap smears or colon screening.  No

## 2021-08-10 NOTE — PROGRESS NOTES
Marisel Hutson is a 77 y.o. male who presents to the office today with the following:  Chief Complaint   Patient presents with    Medication Refill     tramadol       HPI  Doing well  Gained a few lbs    Not using Gabapentin  Not helping much with left leg pain unless taking 1800 mg  Not taking Zofran and Ensure now    Pain in left leg 8/10 since being on the roof yesterday fixing holes  Tingling and burning pain in left leg now    Taking Tramadol prn only to help with sleep  Needs a refill    Needs BW done for Vit D and Lipids  Since TG over 500 last time  Not fasting today  Will RTC for that soon    Review of Systems   Constitutional: Negative for weight loss. Gastrointestinal: Negative for nausea. Musculoskeletal:        Left leg pain   Neurological: Positive for tingling, sensory change and focal weakness. See HPI.     Past Medical History:   Diagnosis Date    Bilateral hip pain     Colon polyps 2013    COPD (chronic obstructive pulmonary disease) ([facility] [address] 04/2017    moderate    Depression     Diverticulitis     ED (erectile dysfunction)     Enteritis 03/2018    Essential hypertension, benign 2006    Hernia of abdominal wall 2017    History of DVT (deep vein thrombosis) 08/2018    History of MI (myocardial infarction) 11/10/2016    History of pulmonary embolus (PE) 08/2018    Hypercholesterolemia     IGT (impaired glucose tolerance)     Inguinal hernia 2017    (R)    Irritable bowel syndrome (IBS) 03/2018    Mononeuropathy     femoral nerve left    Paresthesia of left foot     Renal cyst     Smoker     Vitamin D deficiency 04/2021       Past Surgical History:   Procedure Laterality Date    ENDOSCOPY, COLON, DIAGNOSTIC  2014    HX COLONOSCOPY      w\ polyps    HX CORONARY ARTERY BYPASS GRAFT      HX CORONARY ARTERY BYPASS GRAFT  11/21/2016    4 vessel    HX HERNIA REPAIR  10/1797    paraumbilical, 2 hernias    HX OTHER SURGICAL  09\20\2013    nl stress myoview    HX OTHER SURGICAL      Aortic abdominal bypass    HX OTHER SURGICAL  03/2019    Cystoscopy    HX SMALL BOWEL RESECTION  08/2018    13 inches    HX TOTAL COLECTOMY  08/2018    total       Allergies   Allergen Reactions    Amitriptyline Rash    Simvastatin Myalgia     Pt takes this, says he is not allergic       Current Outpatient Medications   Medication Sig    traMADoL (ULTRAM) 50 mg tablet Take 1 Tablet by mouth every six (6) hours as needed for Pain for up to 30 days. Max Daily Amount: 200 mg.  cholecalciferol (VITAMIN D3) (1000 Units /25 mcg) tablet Take  by mouth daily.  cyclobenzaprine (FLEXERIL) 10 mg tablet Take 1 Tablet by mouth nightly. As needed    carvediloL (COREG) 6.25 mg tablet Take 1 Tablet by mouth two (2) times daily (with meals).  sertraline (ZOLOFT) 100 mg tablet Take 1 Tab by mouth daily.  rivaroxaban (XARELTO) 20 mg tab tablet Take 1 Tab by mouth daily (with breakfast).  metoclopramide HCl (REGLAN) 5 mg tablet Take one tid with meals if needed    traZODone (DESYREL) 100 mg tablet Take 1 Tab by mouth nightly. (Patient taking differently: Take 100 mg by mouth as needed.)    cyanocobalamin (VITAMIN B-12) 1,000 mcg tablet Take 1,000 mcg by mouth daily. Taking 500mcg     No current facility-administered medications for this visit.        Social History     Socioeconomic History    Marital status:      Spouse name: Not on file    Number of children: Not on file    Years of education: Not on file    Highest education level: Not on file   Tobacco Use    Smoking status: Former Smoker     Years: 46.00     Types: Cigarettes    Smokeless tobacco: Former User     Quit date: 11/20/2016    Tobacco comment: smokes a few   Vaping Use    Vaping Use: Never used   Substance and Sexual Activity    Alcohol use: No     Comment: rare    Drug use: No    Sexual activity: Yes     Partners: Female   Other Topics Concern    Blood Transfusions No    Sleep Concern Yes     Comment: Works swing shift    Exercise Yes    Seat Belt Yes    Self-Exams Yes     Social Determinants of Health     Financial Resource Strain:     Difficulty of Paying Living Expenses:    Food Insecurity:     Worried About Running Out of Food in the Last Year:     [address] in the Last Year:    Transportation Needs:     Lack of Transportation (Medical):  Lack of Transportation (Non-Medical):    Physical Activity:     Days of Exercise per Week:     Minutes of Exercise per Session:    Stress:     Feeling of Stress :    Social Connections:     Frequency of Communication with Friends and Family:     Frequency of Social Gatherings with Friends and Family:     Attends Confucianism Services:     Active Member of Clubs or Organizations:     Attends Club or Organization Meetings:     Marital Status:        Family History   Problem Relation Age of Onset    Cancer Sister         cervical    Hypertension Brother          Physical Exam:  Visit Vitals  BP (!) 150/90   Pulse 82   Temp 98 °F (36.7 °C)   Resp 14   Ht 5' 9\" (1.753 m)   Wt 160 lb (72.6 kg)   SpO2 97%   BMI 23.63 kg/m²     Physical Exam  Vitals and nursing note reviewed. Constitutional:       Appearance: He is normal weight. HENT:      Head: Normocephalic and atraumatic. Right Ear: Tympanic membrane, ear canal and external ear normal.      Left Ear: Tympanic membrane, ear canal and external ear normal.   Eyes:      Extraocular Movements: Extraocular movements intact. Conjunctiva/sclera: Conjunctivae normal.   Cardiovascular:      Rate and Rhythm: Normal rate and regular rhythm. Heart sounds: Normal heart sounds. Pulmonary:      Effort: Pulmonary effort is normal.      Breath sounds: Normal breath sounds. Abdominal:      Hernia: A hernia is present. Comments: colostomy   Musculoskeletal:      Right lower leg: No edema. Left lower leg: No edema.       Comments: Left leg muscle atrophy medial thigh, walking with cane   Skin: General: Skin is warm and dry. Neurological:      Mental Status: He is alert and oriented to person, place, and time. Psychiatric:         Mood and Affect: Mood normal.         Behavior: Behavior normal.         Assessment/Plan:    ICD-10-CM ICD-9-CM    1. Left leg pain  M79.605 729.5 traMADoL (ULTRAM) 50 mg tablet   2.  High triglycerides  E78.1 272.1 LIPID PANEL   3. Vitamin D deficiency  E55.9 268.9 VITAMIN D, 25 HYDROXY   4. Essential hypertension  A74 749.2 METABOLIC PANEL, BASIC      LIPID PANEL     Advised to get help with roof and not to climb up there      Amita Guzman MD

## 2021-08-13 ENCOUNTER — LAB ONLY (OUTPATIENT)
Dept: FAMILY MEDICINE CLINIC | Age: 66
End: 2021-08-13
Payer: MEDICARE

## 2021-08-13 DIAGNOSIS — I10 ESSENTIAL HYPERTENSION: ICD-10-CM

## 2021-08-13 DIAGNOSIS — E78.1 HIGH TRIGLYCERIDES: ICD-10-CM

## 2021-08-13 DIAGNOSIS — E55.9 VITAMIN D DEFICIENCY: ICD-10-CM

## 2021-08-13 PROCEDURE — 36415 COLL VENOUS BLD VENIPUNCTURE: CPT | Performed by: FAMILY MEDICINE

## 2021-08-14 LAB
25(OH)D3 SERPL-MCNC: 29.8 NG/ML (ref 30–100)
ANION GAP SERPL CALC-SCNC: 5 MMOL/L (ref 5–15)
BUN SERPL-MCNC: 22 MG/DL (ref 6–20)
BUN/CREAT SERPL: 18 (ref 12–20)
CALCIUM SERPL-MCNC: 9.3 MG/DL (ref 8.5–10.1)
CHLORIDE SERPL-SCNC: 105 MMOL/L (ref 97–108)
CHOLEST SERPL-MCNC: 207 MG/DL
CO2 SERPL-SCNC: 27 MMOL/L (ref 21–32)
CREAT SERPL-MCNC: 1.23 MG/DL (ref 0.7–1.3)
GLUCOSE SERPL-MCNC: 100 MG/DL (ref 65–100)
HDLC SERPL-MCNC: 67 MG/DL
HDLC SERPL: 3.1 {RATIO} (ref 0–5)
LDLC SERPL CALC-MCNC: 104.6 MG/DL (ref 0–100)
POTASSIUM SERPL-SCNC: 4.7 MMOL/L (ref 3.5–5.1)
SODIUM SERPL-SCNC: 137 MMOL/L (ref 136–145)
TRIGL SERPL-MCNC: 177 MG/DL (ref ?–150)
VLDLC SERPL CALC-MCNC: 35.4 MG/DL

## 2021-09-16 ENCOUNTER — TELEPHONE (OUTPATIENT)
Dept: FAMILY MEDICINE CLINIC | Age: 66
End: 2021-09-16

## 2021-09-16 DIAGNOSIS — M79.605 LEFT LEG PAIN: Primary | ICD-10-CM

## 2021-09-16 RX ORDER — TRAMADOL HYDROCHLORIDE 50 MG/1
50 TABLET ORAL
Qty: 84 TABLET | Refills: 0 | Status: SHIPPED | OUTPATIENT
Start: 2021-09-16 | End: 2021-10-07

## 2021-09-16 NOTE — TELEPHONE ENCOUNTER
Pt requests a tramadol refill. He c/o increase in pain. Dr. Catrina Mcguire usually fills this for him. Please send to [email or web address].     Best number is

## 2021-09-20 ENCOUNTER — TELEPHONE (OUTPATIENT)
Dept: FAMILY MEDICINE CLINIC | Age: 66
End: 2021-09-20

## 2021-11-19 DIAGNOSIS — F32.89 OTHER DEPRESSION: ICD-10-CM

## 2021-11-19 NOTE — TELEPHONE ENCOUNTER
Pt will be out of medication tomorrow please [facility]   Requested Prescriptions     Pending Prescriptions Disp Refills    sertraline (ZOLOFT) 100 mg tablet 90 Tablet 1     Sig: Take 1 Tablet by mouth daily.

## 2021-11-19 NOTE — TELEPHONE ENCOUNTER
Requested Prescriptions     Pending Prescriptions Disp Refills    sertraline (ZOLOFT) 100 mg tablet 90 Tablet 1     Sig: Take 1 Tablet by mouth daily.      Lact visit:  Visit date not found

## 2021-11-22 RX ORDER — SERTRALINE HYDROCHLORIDE 100 MG/1
100 TABLET, FILM COATED ORAL DAILY
Qty: 90 TABLET | Refills: 1 | Status: SHIPPED | OUTPATIENT
Start: 2021-11-22 | End: 2022-02-28 | Stop reason: SDUPTHER

## 2021-11-23 ENCOUNTER — OFFICE VISIT (OUTPATIENT)
Dept: FAMILY MEDICINE CLINIC | Age: 66
End: 2021-11-23
Payer: MEDICARE

## 2021-11-23 VITALS
BODY MASS INDEX: 24.25 KG/M2 | DIASTOLIC BLOOD PRESSURE: 89 MMHG | TEMPERATURE: 97.9 F | RESPIRATION RATE: 14 BRPM | HEART RATE: 84 BPM | OXYGEN SATURATION: 95 % | WEIGHT: 160 LBS | HEIGHT: 68 IN | SYSTOLIC BLOOD PRESSURE: 154 MMHG

## 2021-11-23 DIAGNOSIS — H61.20 IMPACTED CERUMEN, UNSPECIFIED LATERALITY: ICD-10-CM

## 2021-11-23 DIAGNOSIS — G58.9 MONONEUROPATHY: ICD-10-CM

## 2021-11-23 DIAGNOSIS — E55.9 VITAMIN D DEFICIENCY: ICD-10-CM

## 2021-11-23 DIAGNOSIS — R73.09 ELEVATED GLUCOSE: ICD-10-CM

## 2021-11-23 DIAGNOSIS — I10 ESSENTIAL HYPERTENSION: ICD-10-CM

## 2021-11-23 DIAGNOSIS — N48.6 PEYRONIE'S DISEASE: Primary | ICD-10-CM

## 2021-11-23 DIAGNOSIS — M79.605 LEFT LEG PAIN: ICD-10-CM

## 2021-11-23 PROCEDURE — 36415 COLL VENOUS BLD VENIPUNCTURE: CPT | Performed by: FAMILY MEDICINE

## 2021-11-23 PROCEDURE — 99214 OFFICE O/P EST MOD 30 MIN: CPT | Performed by: FAMILY MEDICINE

## 2021-11-23 RX ORDER — ONDANSETRON 4 MG/1
4 TABLET, ORALLY DISINTEGRATING ORAL
COMMUNITY

## 2021-11-23 RX ORDER — TRAMADOL HYDROCHLORIDE 50 MG/1
50 TABLET ORAL
COMMUNITY
End: 2021-12-29

## 2021-11-23 RX ORDER — CYCLOBENZAPRINE HCL 10 MG
10 TABLET ORAL
Qty: 90 TABLET | Refills: 0 | Status: SHIPPED | OUTPATIENT
Start: 2021-11-23 | End: 2022-08-18 | Stop reason: SDUPTHER

## 2021-11-23 NOTE — PROGRESS NOTES
Martín Vaughan is a 77 y.o. male who presents to the office today with the following:  Chief Complaint   Patient presents with    Medication Refill       HPI  HTN  BP high  No checks at home  Taking Coreg once a day only, although ordered bid    Using Flexeril at night and with that sleeping well  Off Trazodone now  Still has pain in left leg, but living with it    Wants to see a Urologist in [place] Dr Kay Garcia  For peyronies ds  Has seen him in the past  Not urinating as usual  Slow, weak stream, dribbling  At times hard to get started  Nocturia hardly at all    Vit D def  Taking supplements    Hx of elevated Glucose    Review of Systems   Constitutional: Negative for weight loss. HENT: Negative for congestion. Respiratory: Negative for cough. Cardiovascular: Negative for chest pain. Genitourinary: Negative for dysuria, frequency, hematuria and urgency. See HPI.     Past Medical History:   Diagnosis Date    Bilateral hip pain     Colon polyps 2013    COPD (chronic obstructive pulmonary disease) ([address] 04/2017    moderate    Depression     Diverticulitis     ED (erectile dysfunction)     Enteritis 03/2018    Essential hypertension, benign 2006    Hernia of abdominal wall 2017    History of DVT (deep vein thrombosis) 08/2018    History of MI (myocardial infarction) 11/10/2016    History of pulmonary embolus (PE) 08/2018    Hypercholesterolemia     IGT (impaired glucose tolerance)     Inguinal hernia 2017    (R)    Irritable bowel syndrome (IBS) 03/2018    Mononeuropathy     femoral nerve left    Paresthesia of left foot     Renal cyst     Smoker     Vitamin D deficiency 04/2021       Past Surgical History:   Procedure Laterality Date    ENDOSCOPY, COLON, DIAGNOSTIC  2014    HX COLONOSCOPY      w\ polyps    HX CORONARY ARTERY BYPASS GRAFT      HX CORONARY ARTERY BYPASS GRAFT  11/21/2016    4 vessel    HX HERNIA REPAIR  04/0092    paraumbilical, 2 hernias    HX OTHER SURGICAL  [id number]    nl stress myoview    HX OTHER SURGICAL      Aortic abdominal bypass    HX OTHER SURGICAL  03/2019    Cystoscopy    HX SMALL BOWEL RESECTION  08/2018    13 inches    HX TOTAL COLECTOMY  08/2018    total       Allergies   Allergen Reactions    Amitriptyline Rash    Simvastatin Myalgia     Pt takes this, says he is not allergic       Current Outpatient Medications   Medication Sig    traMADoL (ULTRAM) 50 mg tablet Take 50 mg by mouth every six (6) hours as needed for Pain.  ondansetron (ZOFRAN ODT) 4 mg disintegrating tablet Take 4 mg by mouth every eight (8) hours as needed for Nausea or Vomiting.  cyclobenzaprine (FLEXERIL) 10 mg tablet Take 1 Tablet by mouth nightly. As needed    sertraline (ZOLOFT) 100 mg tablet Take 1 Tablet by mouth daily.  metoclopramide HCl (REGLAN) 5 mg tablet TAKE 1 TABLET BY MOUTH THREE TIMES A DAY WITH MEALS IF NEEDED    cholecalciferol (VITAMIN D3) (1000 Units /25 mcg) tablet Take  by mouth daily.  carvediloL (COREG) 6.25 mg tablet Take 1 Tablet by mouth two (2) times daily (with meals).  rivaroxaban (XARELTO) 20 mg tab tablet Take 1 Tab by mouth daily (with breakfast).  cyanocobalamin (VITAMIN B-12) 1,000 mcg tablet Take 1,000 mcg by mouth daily. Taking 500mcg     No current facility-administered medications for this visit.        Social History     Socioeconomic History    Marital status:    Tobacco Use    Smoking status: Former Smoker     Years: 46.00     Types: Cigarettes    Smokeless tobacco: Former User     Quit date: 11/20/2016    Tobacco comment: smokes a few   Vaping Use    Vaping Use: Never used   Substance and Sexual Activity    Alcohol use: No     Comment: rare    Drug use: No    Sexual activity: Yes     Partners: Female   Other Topics Concern    Blood Transfusions No    Sleep Concern Yes     Comment: Works swing shift    Exercise Yes    Seat Belt Yes    Self-Exams Yes       Family History   Problem Relation Age of Onset    Cancer Sister         cervical    Hypertension Brother          Physical Exam:  Visit Vitals  BP (!) 154/89   Pulse 84   Temp 97.9 °F (36.6 °C)   Resp 14   Ht 5' 8\" (1.727 m)   Wt 160 lb (72.6 kg)   SpO2 95%   BMI 24.33 kg/m²     Physical Exam  Vitals and nursing note reviewed. Constitutional:       Appearance: He is normal weight. HENT:      Head: Normocephalic and atraumatic. Right Ear: Tympanic membrane, ear canal and external ear normal.      Left Ear: There is impacted cerumen. Eyes:      Extraocular Movements: Extraocular movements intact. Conjunctiva/sclera: Conjunctivae normal.   Cardiovascular:      Rate and Rhythm: Normal rate and regular rhythm. Heart sounds: Normal heart sounds. Pulmonary:      Effort: Pulmonary effort is normal.      Breath sounds: Normal breath sounds. Musculoskeletal:      Right lower leg: No edema. Left lower leg: No edema. Lymphadenopathy:      Cervical: No cervical adenopathy. Skin:     General: Skin is warm and dry. Neurological:      Mental Status: He is alert and oriented to person, place, and time. Psychiatric:         Mood and Affect: Mood normal.         Behavior: Behavior normal.         Assessment/Plan:    ICD-10-CM ICD-9-CM    1. Peyronie's disease  N48.6 607.85 REFERRAL TO UROLOGY   2. Essential hypertension  I10 401.9    3. Vitamin D deficiency  E55.9 268.9 VITAMIN D, 25 HYDROXY      VITAMIN D, 25 HYDROXY   4. Elevated glucose  R73.09 790.29 HEMOGLOBIN A1C WITH EAG      COLLECTION VENOUS BLOOD,VENIPUNCTURE      COLLECTION VENOUS BLOOD,VENIPUNCTURE      HEMOGLOBIN A1C WITH EAG   5. Left leg pain  M79.605 729.5 cyclobenzaprine (FLEXERIL) 10 mg tablet   6. Mononeuropathy  G58.9 355.9 cyclobenzaprine (FLEXERIL) 10 mg tablet   7.  Impacted cerumen, unspecified laterality  H61.20 380.4      Advised to take Coreg bid and will recheck BP    Use H2O2 for cerumen and let run out  If worse RTC for removal    Follow-up and Dispositions    · Return in about 3 months (around 2/23/2022).   Follow-up and Disposition History         Melissa Cevallos MD

## 2021-11-23 NOTE — PROGRESS NOTES
1. Have you been to the ER, urgent care clinic since your last visit? Hospitalized since your last visit? No    2. Have you seen or consulted any other health care providers outside of the [address] [place] [address] since your last visit? Include any pap smears or colon screening.  No

## 2021-11-23 NOTE — ACP (ADVANCE CARE PLANNING)
Non-Provider Advance Care Planning (ACP) Note    Date of ACP Conversation: 11/23/2021  Persons included in Conversation: patient  Length of ACP Conversation in minutes: <16 minutes (Non-Billable)    Conversation requested by:   Provider    Authorized Decision Maker (if patient is incapable of making informed decisions):    This person is:  Next of Kin by law (only applies in absence of a Healthcare Power of  or Legal Guardian)      Primary Decision Maker: Rosalinda Nascimento - [facility] - [phone]    General ACP for ALL Patients with Decision Making Capacity:    Advance Directive Conversation with Patients who have not yet planned:  Importance of advance care planning, including choosing a healthcare agent to communicate patient's healthcare decisions if patient lost the ability to make decisions, such as after a sudden illness or accident    Review of Existing Advance Directive: (Select questions covered)  na    Interventions Provided:  Provided ACP educational materials:  Conversation Starter Kit  Advance Directive Form

## 2021-11-24 LAB
25(OH)D3 SERPL-MCNC: 18.5 NG/ML (ref 30–100)
EST. AVERAGE GLUCOSE BLD GHB EST-MCNC: 120 MG/DL
HBA1C MFR BLD: 5.8 % (ref 4–5.6)

## 2021-11-24 NOTE — PROGRESS NOTES
Call pt, the Vit is again low  Take 2000 U of Vit D a day and recheck in 3 mo  The HbA1C is 5.8, in the prediabetes range again, avoid conc sweets

## 2021-12-09 DIAGNOSIS — Z86.711 HISTORY OF PULMONARY EMBOLUS (PE): ICD-10-CM

## 2022-02-24 ENCOUNTER — OFFICE VISIT (OUTPATIENT)
Dept: FAMILY MEDICINE CLINIC | Age: 67
End: 2022-02-24
Payer: MEDICARE

## 2022-02-24 VITALS
TEMPERATURE: 97.8 F | WEIGHT: 157 LBS | BODY MASS INDEX: 24.64 KG/M2 | OXYGEN SATURATION: 98 % | RESPIRATION RATE: 14 BRPM | HEART RATE: 74 BPM | DIASTOLIC BLOOD PRESSURE: 80 MMHG | HEIGHT: 67 IN | SYSTOLIC BLOOD PRESSURE: 135 MMHG

## 2022-02-24 DIAGNOSIS — E55.9 VITAMIN D DEFICIENCY: ICD-10-CM

## 2022-02-24 DIAGNOSIS — E87.5 HYPERKALEMIA: ICD-10-CM

## 2022-02-24 DIAGNOSIS — J44.9 CHRONIC OBSTRUCTIVE PULMONARY DISEASE, UNSPECIFIED COPD TYPE (HCC): ICD-10-CM

## 2022-02-24 DIAGNOSIS — I25.2 HISTORY OF MI (MYOCARDIAL INFARCTION): ICD-10-CM

## 2022-02-24 DIAGNOSIS — G58.9 MONONEUROPATHY: ICD-10-CM

## 2022-02-24 DIAGNOSIS — I10 ESSENTIAL HYPERTENSION: Primary | ICD-10-CM

## 2022-02-24 DIAGNOSIS — Z86.711 HISTORY OF PULMONARY EMBOLUS (PE): ICD-10-CM

## 2022-02-24 PROCEDURE — 99214 OFFICE O/P EST MOD 30 MIN: CPT | Performed by: FAMILY MEDICINE

## 2022-02-24 RX ORDER — TADALAFIL 5 MG/1
5 TABLET ORAL DAILY
COMMUNITY
Start: 2022-01-25

## 2022-02-24 RX ORDER — CARVEDILOL 6.25 MG/1
6.25 TABLET ORAL 2 TIMES DAILY WITH MEALS
Qty: 180 TABLET | Refills: 1 | Status: SHIPPED | OUTPATIENT
Start: 2022-02-24 | End: 2022-08-16 | Stop reason: SDUPTHER

## 2022-02-24 RX ORDER — TRAMADOL HYDROCHLORIDE 50 MG/1
50 TABLET ORAL
Qty: 15 TABLET | Refills: 0 | Status: SHIPPED | OUTPATIENT
Start: 2022-02-24 | End: 2022-04-15 | Stop reason: SDUPTHER

## 2022-02-24 NOTE — PROGRESS NOTES
1. \"Have you been to the ER, urgent care clinic since your last visit? Hospitalized since your last visit? \" No    2. \"Have you seen or consulted any other health care providers outside of the [address] [place] [address] since your last visit? \" No     3. For patients aged 39-70: Has the patient had a colonoscopy / FIT/ Cologuard? Yes - no Care Gap present      If the patient is female:    4. For patients aged 41-77: Has the patient had a mammogram within the past 2 years? NA - based on age or sex      11. For patients aged 21-65: Has the patient had a pap smear?  NA - based on age or sex

## 2022-02-24 NOTE — PROGRESS NOTES
Jefe Valenzuela is a 77 y.o. male who presents to the office today with the following:  Chief Complaint   Patient presents with    Hypertension     follow up, general 3 mo follow up       HPI  HTN  No BP checks at home  Fasting  Had 2 coffee  On coreg only    Vit D def  Taking 1000 U qd    COPD  No sy now  Still smoking some    Hx of MI  Has not seen Card, would like to seen one close by, but not [place]    Neuropathy  Leg is still bad  Not using cane as much  Has not fallen  Pain 8/10  And still using Tramadol prn  Needs refill, last got 15 pill in Dec    Saw Dr Enid Pathak and started Cialis and helping  Will have US soon    Review of Systems   Constitutional: Positive for weight loss (3 lbs). Respiratory: Positive for cough (still smoking a little, 3-4 a day). Negative for shortness of breath. Cardiovascular: Negative for chest pain. See HPI.     Past Medical History:   Diagnosis Date    Bilateral hip pain     Colon polyps 2013    COPD (chronic obstructive pulmonary disease) ([address] 04/2017    moderate    Depression     Diverticulitis     ED (erectile dysfunction)     Enteritis 03/2018    Essential hypertension, benign 2006    Hernia of abdominal wall 2017    History of DVT (deep vein thrombosis) 08/2018    History of MI (myocardial infarction) 11/10/2016    History of pulmonary embolus (PE) 08/2018    Hypercholesterolemia     IGT (impaired glucose tolerance)     Inguinal hernia 2017    (R)    Irritable bowel syndrome (IBS) 03/2018    Mononeuropathy     femoral nerve left    Paresthesia of left foot     Renal cyst     Smoker     Vitamin D deficiency 04/2021       Past Surgical History:   Procedure Laterality Date    ENDOSCOPY, COLON, DIAGNOSTIC  2014    HX COLONOSCOPY      w\ polyps    HX CORONARY ARTERY BYPASS GRAFT      HX CORONARY ARTERY BYPASS GRAFT  11/21/2016    4 vessel    HX HERNIA REPAIR  77/6411    paraumbilical, 2 hernias    HX OTHER SURGICAL  09\20\2013    nl stress myoview    HX OTHER SURGICAL      Aortic abdominal bypass    HX OTHER SURGICAL  03/2019    Cystoscopy    HX SMALL BOWEL RESECTION  08/2018    13 inches    HX TOTAL COLECTOMY  08/2018    total       Allergies   Allergen Reactions    Amitriptyline Rash    Simvastatin Myalgia     Pt takes this, says he is not allergic       Current Outpatient Medications   Medication Sig    rivaroxaban (XARELTO) 20 mg tab tablet Take 1 Tablet by mouth daily (with breakfast).  carvediloL (COREG) 6.25 mg tablet Take 1 Tablet by mouth two (2) times daily (with meals).  traMADoL (ULTRAM) 50 mg tablet Take 1 Tablet by mouth every eight (8) hours as needed for Pain for up to 15 doses. Max Daily Amount: 150 mg.    ondansetron (ZOFRAN ODT) 4 mg disintegrating tablet Take 4 mg by mouth every eight (8) hours as needed for Nausea or Vomiting.  cyclobenzaprine (FLEXERIL) 10 mg tablet Take 1 Tablet by mouth nightly. As needed    sertraline (ZOLOFT) 100 mg tablet Take 1 Tablet by mouth daily.  metoclopramide HCl (REGLAN) 5 mg tablet TAKE 1 TABLET BY MOUTH THREE TIMES A DAY WITH MEALS IF NEEDED    cholecalciferol (VITAMIN D3) (1000 Units /25 mcg) tablet Take  by mouth daily.  cyanocobalamin (VITAMIN B-12) 1,000 mcg tablet Take 1,000 mcg by mouth daily. Taking 500mcg    tadalafiL (CIALIS) 5 mg tablet Take 5 mg by mouth daily. No current facility-administered medications for this visit.        Social History     Socioeconomic History    Marital status:    Tobacco Use    Smoking status: Former Smoker     Years: 46.00     Types: Cigarettes    Smokeless tobacco: Former User     Quit date: 11/20/2016    Tobacco comment: smokes a few   Vaping Use    Vaping Use: Never used   Substance and Sexual Activity    Alcohol use: No     Comment: rare    Drug use: No    Sexual activity: Yes     Partners: Female   Other Topics Concern    Blood Transfusions No    Sleep Concern Yes     Comment: Works swing shift    Exercise Yes    Seat Belt Yes    Self-Exams Yes       Family History   Problem Relation Age of Onset    Cancer Sister         cervical    Hypertension Brother          Physical Exam:  Visit Vitals  /80   Pulse 74   Temp 97.8 °F (36.6 °C)   Resp 14   Ht 5' 7\" (1.702 m)   Wt 157 lb (71.2 kg)   SpO2 98%   BMI 24.59 kg/m²     Physical Exam  Vitals and nursing note reviewed. Constitutional:       Appearance: He is normal weight. HENT:      Head: Normocephalic and atraumatic. Right Ear: Tympanic membrane, ear canal and external ear normal.      Left Ear: There is impacted cerumen. Eyes:      Extraocular Movements: Extraocular movements intact. Conjunctiva/sclera: Conjunctivae normal.   Cardiovascular:      Rate and Rhythm: Normal rate and regular rhythm. Heart sounds: Normal heart sounds. Pulmonary:      Effort: Pulmonary effort is normal.      Breath sounds: Normal breath sounds. Abdominal:      General: There is no distension. Palpations: Abdomen is soft. Tenderness: There is abdominal tenderness (mildly tender right near colostomy). There is no right CVA tenderness, left CVA tenderness or guarding. Hernia: A hernia is present. Musculoskeletal:      Right lower leg: No edema. Left lower leg: No edema. Lymphadenopathy:      Cervical: No cervical adenopathy. Skin:     General: Skin is warm. Neurological:      Mental Status: He is alert and oriented to person, place, and time. Psychiatric:         Mood and Affect: Mood normal.         Behavior: Behavior normal.         Assessment/Plan:    ICD-10-CM ICD-9-CM    1. Essential hypertension  O53 599.5 METABOLIC PANEL, BASIC      CBC WITH AUTOMATED DIFF      carvediloL (COREG) 6.25 mg tablet   2. Vitamin D deficiency  E55.9 268.9 VITAMIN D, 25 HYDROXY   3. Chronic obstructive pulmonary disease, unspecified COPD type (HCC)  J44.9 496 CBC WITH AUTOMATED DIFF   4.  History of pulmonary embolus (PE)  Z86.711 V12.55 rivaroxaban (XARELTO) 20 mg tab tablet   5. History of MI (myocardial infarction)  I25.2 412 REFERRAL TO CARDIOLOGY   6.  Mononeuropathy  G58.9 355.9 traMADoL (ULTRAM) 50 mg tablet     Encouraged to stop smoking      Negin Reece MD

## 2022-02-25 ENCOUNTER — TELEPHONE (OUTPATIENT)
Dept: FAMILY MEDICINE CLINIC | Age: 67
End: 2022-02-25

## 2022-02-25 ENCOUNTER — LAB ONLY (OUTPATIENT)
Dept: FAMILY MEDICINE CLINIC | Age: 67
End: 2022-02-25
Payer: MEDICARE

## 2022-02-25 ENCOUNTER — TELEPHONE (OUTPATIENT)
Dept: CARDIOLOGY CLINIC | Age: 67
End: 2022-02-25

## 2022-02-25 DIAGNOSIS — E87.5 HYPERKALEMIA: Primary | ICD-10-CM

## 2022-02-25 DIAGNOSIS — E87.5 SERUM POTASSIUM ELEVATED: Primary | ICD-10-CM

## 2022-02-25 LAB
25(OH)D3 SERPL-MCNC: 23.2 NG/ML (ref 30–100)
ANION GAP SERPL CALC-SCNC: 5 MMOL/L (ref 5–15)
BASOPHILS # BLD: 0.1 K/UL (ref 0–0.1)
BASOPHILS NFR BLD: 1 % (ref 0–1)
BUN SERPL-MCNC: 18 MG/DL (ref 6–20)
BUN/CREAT SERPL: 12 (ref 12–20)
CALCIUM SERPL-MCNC: 10.4 MG/DL (ref 8.5–10.1)
CHLORIDE SERPL-SCNC: 102 MMOL/L (ref 97–108)
CO2 SERPL-SCNC: 23 MMOL/L (ref 21–32)
CREAT SERPL-MCNC: 1.46 MG/DL (ref 0.7–1.3)
DIFFERENTIAL METHOD BLD: ABNORMAL
EOSINOPHIL # BLD: 0.3 K/UL (ref 0–0.4)
EOSINOPHIL NFR BLD: 2 % (ref 0–7)
ERYTHROCYTE [DISTWIDTH] IN BLOOD BY AUTOMATED COUNT: 14.1 % (ref 11.5–14.5)
GLUCOSE SERPL-MCNC: 87 MG/DL (ref 65–100)
HCT VFR BLD AUTO: 60.3 % (ref 36.6–50.3)
HGB BLD-MCNC: 19.5 G/DL (ref 12.1–17)
IMM GRANULOCYTES # BLD AUTO: 0.1 K/UL (ref 0–0.04)
IMM GRANULOCYTES NFR BLD AUTO: 1 % (ref 0–0.5)
LYMPHOCYTES # BLD: 2.2 K/UL (ref 0.8–3.5)
LYMPHOCYTES NFR BLD: 19 % (ref 12–49)
MCH RBC QN AUTO: 30.6 PG (ref 26–34)
MCHC RBC AUTO-ENTMCNC: 32.3 G/DL (ref 30–36.5)
MCV RBC AUTO: 94.5 FL (ref 80–99)
MONOCYTES # BLD: 0.9 K/UL (ref 0–1)
MONOCYTES NFR BLD: 8 % (ref 5–13)
NEUTS SEG # BLD: 8 K/UL (ref 1.8–8)
NEUTS SEG NFR BLD: 69 % (ref 32–75)
NRBC # BLD: 0 K/UL (ref 0–0.01)
NRBC BLD-RTO: 0 PER 100 WBC
PLATELET # BLD AUTO: 205 K/UL (ref 150–400)
PMV BLD AUTO: 11.7 FL (ref 8.9–12.9)
POTASSIUM SERPL-SCNC: 4 MMOL/L (ref 3.5–5.1)
POTASSIUM SERPL-SCNC: 7.1 MMOL/L (ref 3.5–5.1)
RBC # BLD AUTO: 6.38 M/UL (ref 4.1–5.7)
SODIUM SERPL-SCNC: 130 MMOL/L (ref 136–145)
WBC # BLD AUTO: 11.4 K/UL (ref 4.1–11.1)

## 2022-02-25 PROCEDURE — 36415 COLL VENOUS BLD VENIPUNCTURE: CPT | Performed by: FAMILY MEDICINE

## 2022-02-25 NOTE — TELEPHONE ENCOUNTER
Patient returns call to the office. He has been given an apt for today to come have his blood drawn for a repeat K+ level.

## 2022-02-25 NOTE — PROGRESS NOTES
Thanks for calling pt re Potassium recheck asap  Please let him know too that his WBC's, Hb and other electrolytes and kidney tests are up  Likely he is dehydrated, make sure to drink 64 oz of fluids a day  And recheck in a week  The Vit D is low, take 2000 U a day

## 2022-02-25 NOTE — TELEPHONE ENCOUNTER
I called to schedule a NP appointment and left a VM to call back to schedule.   Pt was referred by Dr. Renny Wright to see Dr Mateus Green

## 2022-02-25 NOTE — TELEPHONE ENCOUNTER
I have attempted to call this patient. I have left him a message that he needs to come by the office today for a repeat K+ level drawn due to a critical value from yesterdays draw.

## 2022-02-25 NOTE — PROGRESS NOTES
I have given this patient these lab results and recommendations from Dr Ramires Brochure. Patient verbalizes understanding.

## 2022-02-28 DIAGNOSIS — I10 ESSENTIAL HYPERTENSION: ICD-10-CM

## 2022-02-28 DIAGNOSIS — F32.89 OTHER DEPRESSION: ICD-10-CM

## 2022-02-28 DIAGNOSIS — R11.0 NAUSEA: ICD-10-CM

## 2022-02-28 RX ORDER — METOCLOPRAMIDE 5 MG/1
TABLET ORAL
Qty: 90 TABLET | Refills: 0 | Status: SHIPPED | OUTPATIENT
Start: 2022-02-28 | End: 2022-06-05

## 2022-02-28 RX ORDER — SERTRALINE HYDROCHLORIDE 100 MG/1
100 TABLET, FILM COATED ORAL DAILY
Qty: 90 TABLET | Refills: 1 | Status: SHIPPED | OUTPATIENT
Start: 2022-02-28 | End: 2022-09-28

## 2022-02-28 RX ORDER — CARVEDILOL 6.25 MG/1
6.25 TABLET ORAL 2 TIMES DAILY WITH MEALS
Qty: 180 TABLET | Refills: 1 | OUTPATIENT
Start: 2022-02-28

## 2022-02-28 NOTE — PROGRESS NOTES
Spoke with patient after verifying Name, and , informed patient of lab results per Dr. Meghna Carl . Patient given an opportunity to ask questions, repeated information, and verbalized understanding.

## 2022-02-28 NOTE — TELEPHONE ENCOUNTER
Please send to new mail order pharmacy for 90 day supply. Requested Prescriptions     Pending Prescriptions Disp Refills    metoclopramide HCl (REGLAN) 5 mg tablet 90 Tablet 0     Sig: TAKE 1 TABLET BY MOUTH THREE TIMES A DAY WITH MEALS IF NEEDED    carvediloL (COREG) 6.25 mg tablet 180 Tablet 1     Sig: Take 1 Tablet by mouth two (2) times daily (with meals).  sertraline (ZOLOFT) 100 mg tablet 90 Tablet 1     Sig: Take 1 Tablet by mouth daily.

## 2022-03-01 ENCOUNTER — TELEPHONE (OUTPATIENT)
Dept: CARDIOLOGY CLINIC | Age: 67
End: 2022-03-01

## 2022-03-01 NOTE — TELEPHONE ENCOUNTER
Called and left 2 nd  to call and scheduled a NP visit with Dr. Malik Lemos referred by Dr. Clifton Collier.       Thanks Di

## 2022-03-18 PROBLEM — N28.1 RENAL CYST, RIGHT: Status: ACTIVE | Noted: 2019-02-01

## 2022-03-19 PROBLEM — O22.30 DVT (DEEP VEIN THROMBOSIS) IN PREGNANCY: Status: ACTIVE | Noted: 2018-08-01

## 2022-03-20 PROBLEM — J44.9 COPD (CHRONIC OBSTRUCTIVE PULMONARY DISEASE) (HCC): Status: ACTIVE | Noted: 2017-04-01

## 2022-04-15 DIAGNOSIS — G58.9 MONONEUROPATHY: ICD-10-CM

## 2022-04-15 RX ORDER — TRAMADOL HYDROCHLORIDE 50 MG/1
50 TABLET ORAL
Qty: 15 TABLET | Refills: 0 | Status: SHIPPED | OUTPATIENT
Start: 2022-04-15 | End: 2022-05-02 | Stop reason: SDUPTHER

## 2022-04-15 NOTE — TELEPHONE ENCOUNTER
Patient is aware Dr. Charlotte Nathan is not in the office today. He requested Dr. Manjeet Barrow to refill. Requested Prescriptions     Pending Prescriptions Disp Refills    traMADoL (ULTRAM) 50 mg tablet 15 Tablet 0     Sig: Take 1 Tablet by mouth every eight (8) hours as needed for Pain for up to 15 doses. Max Daily Amount: 150 mg.

## 2022-05-02 ENCOUNTER — TELEPHONE (OUTPATIENT)
Dept: FAMILY MEDICINE CLINIC | Age: 67
End: 2022-05-02

## 2022-05-02 DIAGNOSIS — G58.9 MONONEUROPATHY: ICD-10-CM

## 2022-05-02 RX ORDER — TRAMADOL HYDROCHLORIDE 50 MG/1
50 TABLET ORAL
Qty: 15 TABLET | Refills: 0 | Status: SHIPPED | OUTPATIENT
Start: 2022-05-02 | End: 2022-05-31 | Stop reason: SDUPTHER

## 2022-05-31 DIAGNOSIS — G58.9 MONONEUROPATHY: ICD-10-CM

## 2022-05-31 RX ORDER — TRAMADOL HYDROCHLORIDE 50 MG/1
50 TABLET ORAL
Qty: 15 TABLET | Refills: 0 | Status: SHIPPED | OUTPATIENT
Start: 2022-05-31 | End: 2022-06-27 | Stop reason: SDUPTHER

## 2022-05-31 NOTE — TELEPHONE ENCOUNTER
Requested Prescriptions     Pending Prescriptions Disp Refills    traMADoL (ULTRAM) 50 mg tablet 15 Tablet 0     Sig: Take 1 Tablet by mouth every eight (8) hours as needed for Pain for up to 15 doses. Max Daily Amount: 150 mg.

## 2022-06-27 ENCOUNTER — OFFICE VISIT (OUTPATIENT)
Dept: FAMILY MEDICINE CLINIC | Age: 67
End: 2022-06-27
Payer: MEDICARE

## 2022-06-27 VITALS
DIASTOLIC BLOOD PRESSURE: 84 MMHG | TEMPERATURE: 97.6 F | OXYGEN SATURATION: 96 % | WEIGHT: 156.2 LBS | HEIGHT: 67 IN | HEART RATE: 69 BPM | RESPIRATION RATE: 16 BRPM | SYSTOLIC BLOOD PRESSURE: 134 MMHG | BODY MASS INDEX: 24.52 KG/M2

## 2022-06-27 DIAGNOSIS — Z00.00 MEDICARE ANNUAL WELLNESS VISIT, SUBSEQUENT: ICD-10-CM

## 2022-06-27 DIAGNOSIS — R73.09 ELEVATED GLUCOSE: ICD-10-CM

## 2022-06-27 DIAGNOSIS — I25.118 CORONARY ARTERY DISEASE OF NATIVE HEART WITH STABLE ANGINA PECTORIS, UNSPECIFIED VESSEL OR LESION TYPE (HCC): ICD-10-CM

## 2022-06-27 DIAGNOSIS — E55.9 VITAMIN D DEFICIENCY: ICD-10-CM

## 2022-06-27 DIAGNOSIS — G58.9 MONONEUROPATHY: Primary | ICD-10-CM

## 2022-06-27 DIAGNOSIS — I10 ESSENTIAL HYPERTENSION: ICD-10-CM

## 2022-06-27 PROCEDURE — 99214 OFFICE O/P EST MOD 30 MIN: CPT | Performed by: FAMILY MEDICINE

## 2022-06-27 PROCEDURE — G0439 PPPS, SUBSEQ VISIT: HCPCS | Performed by: FAMILY MEDICINE

## 2022-06-27 RX ORDER — TRAMADOL HYDROCHLORIDE 50 MG/1
50 TABLET ORAL
Qty: 15 TABLET | Refills: 0 | Status: SHIPPED | OUTPATIENT
Start: 2022-06-27 | End: 2022-08-18 | Stop reason: SDUPTHER

## 2022-06-27 NOTE — PROGRESS NOTES
Roland Blum is a 79 y.o. male who presents to the office today with the following:  Chief Complaint   Patient presents with   [facility] Annual Wellness Visit         Medication Refill     Tramadol       HPI  HM reviewed    HTN    Elevated Glucose  No BS checks    Vit D def  Taking Vit D 1000 mg    And B12 2000 U    Mononeuropathy  Pain in left leg from Neuropathy, chronic  8/10  Worse cutting the lawn and walking long distance, half a mile  Can hardly gets back home  Getting worse  Other option Botox injections  Will call Neurologist re it  Has fallen a few times    On Xeralto 20    On Zoloft 100 mg  Doing much better    S/p colectomy    Taking all meds once a day  Weight stable    Still smoking    Has not seen Card lately  Hx of CAD  Has occ CP  4 bursts then nothing happens occ only  On left side  Sharp bursts  occ a little dizziness      Review of Systems   Respiratory: Positive for shortness of breath. Cardiovascular: Negative for chest pain. Gastrointestinal: Positive for abdominal pain (from colectomy). Negative for heartburn. Musculoskeletal:        Left leg pain   Neurological: Positive for dizziness and headaches (occ). See HPI.     Past Medical History:   Diagnosis Date    Bilateral hip pain     Colon polyps 2013    COPD (chronic obstructive pulmonary disease) ([facility] [address] 04/2017    moderate    Depression     Diverticulitis     ED (erectile dysfunction)     Enteritis 03/2018    Essential hypertension, benign 2006    Hernia of abdominal wall 2017    History of DVT (deep vein thrombosis) 08/2018    History of MI (myocardial infarction) 11/10/2016    History of pulmonary embolus (PE) 08/2018    Hypercholesterolemia     IGT (impaired glucose tolerance)     Inguinal hernia 2017    (R)    Irritable bowel syndrome (IBS) 03/2018    Mononeuropathy     femoral nerve left    Paresthesia of left foot     Renal cyst     Smoker     Vitamin D deficiency 04/2021       Past Surgical History: Procedure Laterality Date    ENDOSCOPY, COLON, DIAGNOSTIC  2014    HX COLONOSCOPY      w\ polyps    HX CORONARY ARTERY BYPASS GRAFT      HX CORONARY ARTERY BYPASS GRAFT  11/21/2016    4 vessel    HX HERNIA REPAIR  85/3488    paraumbilical, 2 hernias    HX OTHER SURGICAL  [phone]    nl stress myoview    HX OTHER SURGICAL      Aortic abdominal bypass    HX OTHER SURGICAL  03/2019    Cystoscopy    HX SMALL BOWEL RESECTION  08/2018    13 inches    HX TOTAL COLECTOMY  08/2018    total       Allergies   Allergen Reactions    Amitriptyline Rash    Simvastatin Myalgia     Pt takes this, says he is not allergic       Current Outpatient Medications   Medication Sig    traMADoL (ULTRAM) 50 mg tablet Take 1 Tablet by mouth every eight (8) hours as needed for Pain for up to 15 doses. Max Daily Amount: 150 mg.    metoclopramide HCl (REGLAN) 5 mg tablet TAKE 1 TABLET THREE TIMES A DAY WITH MEALS IF NEEDED    sertraline (ZOLOFT) 100 mg tablet Take 1 Tablet by mouth daily.  tadalafiL (CIALIS) 5 mg tablet Take 5 mg by mouth daily.  rivaroxaban (XARELTO) 20 mg tab tablet Take 1 Tablet by mouth daily (with breakfast).  carvediloL (COREG) 6.25 mg tablet Take 1 Tablet by mouth two (2) times daily (with meals).  ondansetron (ZOFRAN ODT) 4 mg disintegrating tablet Take 4 mg by mouth every eight (8) hours as needed for Nausea or Vomiting.  cyclobenzaprine (FLEXERIL) 10 mg tablet Take 1 Tablet by mouth nightly. As needed    cholecalciferol (VITAMIN D3) (1000 Units /25 mcg) tablet Take  by mouth daily.  cyanocobalamin (VITAMIN B-12) 1,000 mcg tablet Take 1,000 mcg by mouth daily. Taking 500mcg     No current facility-administered medications for this visit.        Social History     Socioeconomic History    Marital status:    Tobacco Use    Smoking status: Former Smoker     Years: 46.00     Types: Cigarettes    Smokeless tobacco: Former User     Quit date: 11/20/2016    Tobacco comment: smokes a few   Vaping Use    Vaping Use: Never used   Substance and Sexual Activity    Alcohol use: No     Comment: rare    Drug use: No    Sexual activity: Yes     Partners: Female   Other Topics Concern    Blood Transfusions No    Sleep Concern Yes     Comment: Works swing shift    Exercise Yes    Seat Belt Yes    Self-Exams Yes       Family History   Problem Relation Age of Onset    Cancer Sister         cervical    Hypertension Brother          Physical Exam:  Visit Vitals  /84   Pulse 69   Temp 97.6 °F (36.4 °C) (Temporal)   Resp 16   Ht 5' 7\" (1.702 m)   Wt 156 lb 3.2 oz (70.9 kg)   SpO2 96%   BMI 24.46 kg/m²     Physical Exam  Vitals and nursing note reviewed. Constitutional:       Appearance: He is normal weight. HENT:      Head: Normocephalic and atraumatic. Right Ear: There is impacted cerumen. Left Ear: There is impacted cerumen. Eyes:      Extraocular Movements: Extraocular movements intact. Conjunctiva/sclera: Conjunctivae normal.   Cardiovascular:      Rate and Rhythm: Normal rate and regular rhythm. Pulses: Normal pulses. Heart sounds: Normal heart sounds. Pulmonary:      Effort: Pulmonary effort is normal.      Breath sounds: Normal breath sounds. Abdominal:      General: Bowel sounds are normal. There is distension (d/t hernia). Palpations: Abdomen is soft. Tenderness: There is no abdominal tenderness. There is no right CVA tenderness, left CVA tenderness or guarding. Musculoskeletal:      Right lower leg: No edema. Left lower leg: No edema. Lymphadenopathy:      Cervical: No cervical adenopathy. Skin:     General: Skin is warm and dry. Neurological:      Mental Status: He is alert and oriented to person, place, and time. Psychiatric:         Mood and Affect: Mood normal.         Behavior: Behavior normal.         Assessment/Plan:    ICD-10-CM ICD-9-CM    1. Mononeuropathy  G58.9 355.9 traMADoL (ULTRAM) 50 mg tablet   2.  Essential hypertension  I10 401.9 CBC WITH AUTOMATED DIFF      METABOLIC PANEL, COMPREHENSIVE      CBC WITH AUTOMATED DIFF      METABOLIC PANEL, COMPREHENSIVE   3. Vitamin D deficiency  E55.9 268.9 VITAMIN D, 25 HYDROXY      VITAMIN D, 25 HYDROXY   4. Elevated glucose  R73.09 790.29 HEMOGLOBIN A1C WITH EAG      HEMOGLOBIN A1C WITH EAG   5. Medicare annual wellness visit, subsequent  Z00.00 V70.0    6.  Coronary artery disease of native heart with stable angina pectoris, unspecified vessel or lesion type ([facility] [address]  I25.118 414.01 REFERRAL TO CARDIOLOGY     413.9            Lou Zapata MD

## 2022-06-27 NOTE — ACP (ADVANCE CARE PLANNING)
No notes on file    Non-Provider Advance Care Planning (ACP) Note    Date of ACP Conversation: 6/27/2022  Persons included in Conversation: patient  Length of ACP Conversation in minutes: <16 minutes (Non-Billable)    Conversation requested by:   Provider    Authorized Decision Maker (if patient is incapable of making informed decisions):    This person is:  Next of Kin by law (only applies in absence of a Healthcare Power of  or Legal Guardian)      Primary Decision Maker: Davi Lopez [facility] - [phone]    General ACP for ALL Patients with Decision Making Capacity:    Advance Directive Conversation with Patients who have not yet planned:  Importance of advance care planning, including choosing a healthcare agent to communicate patient's healthcare decisions if patient lost the ability to make decisions, such as after a sudden illness or accident    Review of Existing Advance Directive: (Select questions covered)  N/A    Interventions Provided:  Patient has forms at [employer]

## 2022-06-27 NOTE — PATIENT INSTRUCTIONS
Medicare Wellness Visit, Male    The best way to live healthy is to have a lifestyle where you eat a well-balanced diet, exercise regularly, limit alcohol use, and quit all forms of tobacco/nicotine, if applicable. Regular preventive services are another way to keep healthy. Preventive services (vaccines, screening tests, monitoring & exams) can help personalize your care plan, which helps you manage your own care. Screening tests can find health problems at the earliest stages, when they are easiest to treat. [facility] follows the current, evidence-based guidelines published by the [facility] Laura ([facility]) when recommending preventive services for our patients. Because we follow these guidelines, sometimes recommendations change over time as research supports it. (For example, a prostate screening blood test is no longer routinely recommended for men with no symptoms). Of course, you and your doctor may decide to screen more often for some diseases, based on your risk and co-morbidities (chronic disease you are already diagnosed with). Preventive services for you include:  - Medicare offers their members a free annual wellness visit, which is time for you and your primary care provider to discuss and plan for your preventive service needs. Take advantage of this benefit every year!  -All adults over age 72 should receive the recommended pneumonia vaccines. Current USPSTF guidelines recommend a series of two vaccines for the best pneumonia protection.   -All adults should have a flu vaccine yearly and tetanus vaccine every 10 years.  -All adults age 48 and older should receive the shingles vaccines (series of two vaccines).        -All adults age 38-68 who are overweight should have a diabetes screening test once every three years.   -Other screening tests & preventive services for persons with diabetes include: an eye exam to screen for diabetic retinopathy, a kidney function test, a foot exam, and stricter control over your cholesterol.   -Cardiovascular screening for adults with routine risk involves an electrocardiogram (ECG) at intervals determined by the provider.   -Colorectal cancer screening should be done for adults age 54-65 with no increased risk factors for colorectal cancer. There are a number of acceptable methods of screening for this type of cancer. Each test has its own benefits and drawbacks. Discuss with your provider what is most appropriate for you during your annual wellness visit. The different tests include: colonoscopy (considered the best screening method), a fecal occult blood test, a fecal DNA test, and sigmoidoscopy.  -All adults born between [facility] should be screened once for Hepatitis C.  -An Abdominal Aortic Aneurysm (AAA) Screening is recommended for men age 73-68 who has ever smoked in their lifetime.      Here is a list of your current Health Maintenance items (your personalized list of preventive services) with a due date:  Health Maintenance Due   Topic Date Due    Pneumococcal Vaccine (2 - PCV) 09/28/2018    Shingles Vaccine (2 of 2) 01/05/2022

## 2022-06-27 NOTE — PROGRESS NOTES
1. \"Have you been to the ER, urgent care clinic since your last visit? Hospitalized since your last visit? \" No    2. \"Have you seen or consulted any other health care providers outside of the [address] [place] [address] since your last visit? \" No     3. For patients aged 39-70: Has the patient had a colonoscopy / FIT/ Cologuard? NA - based on age      If the patient is female:    4. For patients aged 41-77: Has the patient had a mammogram within the past 2 years? NA - based on age or sex      11. For patients aged 21-65: Has the patient had a pap smear? NA - based on age or sex      This is the Subsequent Medicare Annual Wellness Exam, performed 12 months or more after the Initial AWV or the last Subsequent AWV    I have reviewed the patient's medical history in detail and updated the computerized patient record. Assessment/Plan   Education and counseling provided:  Are appropriate based on today's review and evaluation    1. Mononeuropathy  2. Essential hypertension  -     CBC WITH AUTOMATED DIFF; Future  -     METABOLIC PANEL, COMPREHENSIVE; Future  3. Vitamin D deficiency  -     VITAMIN D, 25 HYDROXY; Future  4. Elevated glucose  -     HEMOGLOBIN A1C WITH EAG;  Future       Depression Risk Factor Screening     3 most recent PHQ Screens 6/27/2022   PHQ Not Done -   Little interest or pleasure in doing things Nearly every day   Feeling down, depressed, irritable, or hopeless Nearly every day   Total Score PHQ 2 6   Trouble falling or staying asleep, or sleeping too much Nearly every day   Feeling tired or having little energy Nearly every day   Poor appetite, weight loss, or overeating Several days   Feeling bad about yourself - or that you are a failure or have let yourself or your family down Not at all   Trouble concentrating on things such as school, work, reading, or watching TV Not at all   Moving or speaking so slowly that other people could have noticed; or the opposite being so fidgety that others notice Not at all   Thoughts of being better off dead, or hurting yourself in some way Not at all   PHQ 9 Score 13   How difficult have these problems made it for you to do your work, take care of your home and get along with others Somewhat difficult       Alcohol & Drug Abuse Risk Screen    Do you average more than 1 drink per night or more than 7 drinks a week: Yes    In the past three months have you have had more than 4 drinks containing alcohol on one occasion: No          Functional Ability and Level of Safety    Hearing: Hearing is good. Activities of Daily Living: The home contains: no safety equipment. Patient does total self care      Ambulation: with difficulty, uses a cane     Fall Risk:  Fall Risk Assessment, last 12 mths 6/27/2022   Able to walk? Yes   Fall in past 12 months? 1   Do you feel unsteady? 0   Are you worried about falling 0   Is the gait abnormal? 1   Number of falls in past 12 months 2   Fall with injury?  0      Abuse Screen:  Patient is not abused       Cognitive Screening    Has your family/caregiver stated any concerns about your memory: no     Cognitive Screening: Not done today    Health Maintenance Due     Health Maintenance Due   Topic Date Due    Pneumococcal 65+ years (2 - PCV) 09/28/2018    Shingrix Vaccine Age 50> (2 of 2) 01/05/2022    Medicare Yearly Exam  04/28/2022       Patient Care Team   Patient Care Team:  Mikie Pradhan MD as PCP - General (Family Medicine)  Mikie Pradhan MD as PCP - [facility] Provider    History     Patient Active Problem List   Diagnosis Code    Hypertension I10    Hyperlipidemia Y74.8    Metabolic syndrome B97.37    Prediabetes R73.03    COPD (chronic obstructive pulmonary disease) ([facility] [address] J44.9    History of MI (myocardial infarction) I25.2    Mononeuropathy G58.9    DVT (deep vein thrombosis) in pregnancy O22.30    Renal cyst, right N28.1     Past Medical History:   Diagnosis Date    Bilateral hip pain     Colon polyps 2013    COPD (chronic obstructive pulmonary disease) (HCC) 04/2017    moderate    Depression     Diverticulitis     ED (erectile dysfunction)     Enteritis 03/2018    Essential hypertension, benign 2006    Hernia of abdominal wall 2017    History of DVT (deep vein thrombosis) 08/2018    History of MI (myocardial infarction) 11/10/2016    History of pulmonary embolus (PE) 08/2018    Hypercholesterolemia     IGT (impaired glucose tolerance)     Inguinal hernia 2017    (R)    Irritable bowel syndrome (IBS) 03/2018    Mononeuropathy     femoral nerve left    Paresthesia of left foot     Renal cyst     Smoker     Vitamin D deficiency 04/2021      Past Surgical History:   Procedure Laterality Date    ENDOSCOPY, COLON, DIAGNOSTIC  2014    HX COLONOSCOPY      w\ polyps    HX CORONARY ARTERY BYPASS GRAFT      HX CORONARY ARTERY BYPASS GRAFT  11/21/2016    4 vessel    HX HERNIA REPAIR  50/5182    paraumbilical, 2 hernias    HX OTHER SURGICAL  [id number]    nl stress myoview    HX OTHER SURGICAL      Aortic abdominal bypass    HX OTHER SURGICAL  03/2019    Cystoscopy    HX SMALL BOWEL RESECTION  08/2018    13 inches    HX TOTAL COLECTOMY  08/2018    total     Current Outpatient Medications   Medication Sig Dispense Refill    metoclopramide HCl (REGLAN) 5 mg tablet TAKE 1 TABLET THREE TIMES A DAY WITH MEALS IF NEEDED 90 Tablet 0    traMADoL (ULTRAM) 50 mg tablet Take 1 Tablet by mouth every eight (8) hours as needed for Pain for up to 15 doses. Max Daily Amount: 150 mg. 15 Tablet 0    sertraline (ZOLOFT) 100 mg tablet Take 1 Tablet by mouth daily. 90 Tablet 1    tadalafiL (CIALIS) 5 mg tablet Take 5 mg by mouth daily.  rivaroxaban (XARELTO) 20 mg tab tablet Take 1 Tablet by mouth daily (with breakfast). 90 Tablet 1    carvediloL (COREG) 6.25 mg tablet Take 1 Tablet by mouth two (2) times daily (with meals).  180 Tablet 1    ondansetron (ZOFRAN ODT) 4 mg disintegrating tablet Take 4 mg by mouth every eight (8) hours as needed for Nausea or Vomiting.  cyclobenzaprine (FLEXERIL) 10 mg tablet Take 1 Tablet by mouth nightly. As needed 90 Tablet 0    cholecalciferol (VITAMIN D3) (1000 Units /25 mcg) tablet Take  by mouth daily.  cyanocobalamin (VITAMIN B-12) 1,000 mcg tablet Take 1,000 mcg by mouth daily. Taking 500mcg       Allergies   Allergen Reactions    Amitriptyline Rash    Simvastatin Myalgia     Pt takes this, says he is not allergic       Family History   Problem Relation Age of Onset    Cancer Sister         cervical    Hypertension Brother      Social History     Tobacco Use    Smoking status: Former Smoker     Years: 46.00     Types: Cigarettes    Smokeless tobacco: Former User     Quit date: 11/20/2016    Tobacco comment: smokes a few   Substance Use Topics    Alcohol use: No     Comment: rare       Functional Ability:   Does the patient exhibit a steady gait?  no   How long did it take the patient to get up and walk from a sitting position? 4 seconds   Is the patient self reliant?  (ie can do own laundry, meals, household chores)  yes     Does the patient handle his/her own medications? yes     Does the patient handle his/her own money? yes     Is the patients home safe (ie good lighting, handrails on stairs and bath, etc.)? yes     Did you notice or did patient express any hearing difficulties? no     Did you notice or did patient express any vision difficulties? yes     Were distance and reading eye charts used? no       Advance Care Planning:   Patient was offered the opportunity to discuss advance care planning:  yes     Does patient have an Advance Directive:  no   If no, did you provide information on Caring Connections? No, patient has forms at home.      ADL Assessment 2/24/2022   Feeding yourself No Help Needed   Getting from bed to chair No Help Needed   Getting dressed No Help Needed   Bathing or showering No Help Needed   Walk across the room (includes cane/walker) No Help Needed   Using the telphone No Help Needed   Taking your medications No Help Needed   Preparing meals No Help Needed   Managing money (expenses/bills) No Help Needed   Moderately strenuous housework (laundry) No Help Needed   Shopping for personal items (toiletries/medicines) No Help Needed   Shopping for groceries No Help Needed   Driving No Help Needed   Climbing a flight of stairs No Help Needed   Getting to places beyond walking distances No Help Needed       Abuse Screening Questionnaire 2/24/2022   Do you ever feel afraid of your partner? N   Are you in a relationship with someone who physically or mentally threatens you? N   Is it safe for you to go home?  Zack Ramsey, RN

## 2022-06-28 LAB
25(OH)D3 SERPL-MCNC: 36.5 NG/ML (ref 30–100)
ALBUMIN SERPL-MCNC: 4.1 G/DL (ref 3.5–5)
ALBUMIN/GLOB SERPL: 1.2 {RATIO} (ref 1.1–2.2)
ALP SERPL-CCNC: 94 U/L (ref 45–117)
ALT SERPL-CCNC: 20 U/L (ref 12–78)
ANION GAP SERPL CALC-SCNC: 9 MMOL/L (ref 5–15)
AST SERPL-CCNC: 26 U/L (ref 15–37)
BASOPHILS # BLD: 0.1 K/UL (ref 0–0.1)
BASOPHILS NFR BLD: 1 % (ref 0–1)
BILIRUB SERPL-MCNC: 0.7 MG/DL (ref 0.2–1)
BUN SERPL-MCNC: 26 MG/DL (ref 6–20)
BUN/CREAT SERPL: 21 (ref 12–20)
CALCIUM SERPL-MCNC: 9.5 MG/DL (ref 8.5–10.1)
CHLORIDE SERPL-SCNC: 105 MMOL/L (ref 97–108)
CO2 SERPL-SCNC: 21 MMOL/L (ref 21–32)
CREAT SERPL-MCNC: 1.25 MG/DL (ref 0.7–1.3)
DIFFERENTIAL METHOD BLD: ABNORMAL
EOSINOPHIL # BLD: 0.3 K/UL (ref 0–0.4)
EOSINOPHIL NFR BLD: 3 % (ref 0–7)
ERYTHROCYTE [DISTWIDTH] IN BLOOD BY AUTOMATED COUNT: 14 % (ref 11.5–14.5)
EST. AVERAGE GLUCOSE BLD GHB EST-MCNC: 117 MG/DL
GLOBULIN SER CALC-MCNC: 3.5 G/DL (ref 2–4)
GLUCOSE SERPL-MCNC: 112 MG/DL (ref 65–100)
HBA1C MFR BLD: 5.7 % (ref 4–5.6)
HCT VFR BLD AUTO: 57.9 % (ref 36.6–50.3)
HGB BLD-MCNC: 19.1 G/DL (ref 12.1–17)
IMM GRANULOCYTES # BLD AUTO: 0.1 K/UL (ref 0–0.04)
IMM GRANULOCYTES NFR BLD AUTO: 1 % (ref 0–0.5)
LYMPHOCYTES # BLD: 2.5 K/UL (ref 0.8–3.5)
LYMPHOCYTES NFR BLD: 23 % (ref 12–49)
MCH RBC QN AUTO: 30.7 PG (ref 26–34)
MCHC RBC AUTO-ENTMCNC: 33 G/DL (ref 30–36.5)
MCV RBC AUTO: 92.9 FL (ref 80–99)
MONOCYTES # BLD: 1.1 K/UL (ref 0–1)
MONOCYTES NFR BLD: 10 % (ref 5–13)
NEUTS SEG # BLD: 6.8 K/UL (ref 1.8–8)
NEUTS SEG NFR BLD: 62 % (ref 32–75)
NRBC # BLD: 0 K/UL (ref 0–0.01)
NRBC BLD-RTO: 0 PER 100 WBC
PLATELET # BLD AUTO: 163 K/UL (ref 150–400)
PMV BLD AUTO: 12.5 FL (ref 8.9–12.9)
POTASSIUM SERPL-SCNC: 4.1 MMOL/L (ref 3.5–5.1)
PROT SERPL-MCNC: 7.6 G/DL (ref 6.4–8.2)
RBC # BLD AUTO: 6.23 M/UL (ref 4.1–5.7)
SODIUM SERPL-SCNC: 135 MMOL/L (ref 136–145)
WBC # BLD AUTO: 10.9 K/UL (ref 4.1–11.1)

## 2022-06-28 NOTE — PROGRESS NOTES
Call pt, the HbA1C is in the prediabetes range, but almost normal  Cont low conc sweets diet  The CBC is good  The electrolytes, kidney tests are ok  The liver tests are normal  The Vit D is normal  Cont current meds and   Recheck in 6 mo

## 2022-06-30 ENCOUNTER — TELEPHONE (OUTPATIENT)
Dept: FAMILY MEDICINE CLINIC | Age: 67
End: 2022-06-30

## 2022-06-30 NOTE — TELEPHONE ENCOUNTER
I have called this patient and let him know that we are working on his referral.  His notes will be faxed to the referred provider and that office should be calling him to make an apt.

## 2022-06-30 NOTE — TELEPHONE ENCOUNTER
----- Message from Gatito Linares sent at 6/30/2022 12:39 PM EDT -----  Subject: Message to Provider    QUESTIONS  Information for Provider? Pt stated that he had appt on 06/27 and Dr Logan Christensen   stated she was sending referral to a cardiologist at [facility]. Pt   called the office and was told the referral was not sent over and to   contact his PCP to ask them to send over. Please contact pt to advise. Pt   would like this sent over asap due to triple bypass.   ---------------------------------------------------------------------------  --------------  CALL BACK INFO  What is the best way for the office to contact you? OK to leave message on   voicemail  Preferred Call Back Phone Number? [phone]  ---------------------------------------------------------------------------  --------------  SCRIPT ANSWERS  Relationship to Patient?  Self

## 2022-07-05 ENCOUNTER — OFFICE VISIT (OUTPATIENT)
Dept: FAMILY MEDICINE CLINIC | Age: 67
End: 2022-07-05
Payer: MEDICARE

## 2022-07-05 ENCOUNTER — TELEPHONE (OUTPATIENT)
Dept: FAMILY MEDICINE CLINIC | Age: 67
End: 2022-07-05

## 2022-07-05 VITALS
WEIGHT: 156 LBS | SYSTOLIC BLOOD PRESSURE: 150 MMHG | BODY MASS INDEX: 23.64 KG/M2 | OXYGEN SATURATION: 97 % | RESPIRATION RATE: 14 BRPM | HEIGHT: 68 IN | HEART RATE: 76 BPM | TEMPERATURE: 98 F | DIASTOLIC BLOOD PRESSURE: 98 MMHG

## 2022-07-05 DIAGNOSIS — R31.0 GROSS HEMATURIA: Primary | ICD-10-CM

## 2022-07-05 DIAGNOSIS — R19.8 TENESMUS (RECTAL): ICD-10-CM

## 2022-07-05 DIAGNOSIS — K62.89 PERIRECTAL INFLAMMATION: ICD-10-CM

## 2022-07-05 LAB
BILIRUB UR QL STRIP: NORMAL
GLUCOSE UR-MCNC: NEGATIVE MG/DL
HEMOCCULT STL QL: NEGATIVE
KETONES P FAST UR STRIP-MCNC: NEGATIVE MG/DL
PH UR STRIP: 6 [PH] (ref 4.6–8)
PROT UR QL STRIP: NORMAL
SP GR UR STRIP: 1.02 (ref 1–1.03)
UA UROBILINOGEN AMB POC: NORMAL (ref 0.2–1)
URINALYSIS CLARITY POC: NORMAL
URINALYSIS COLOR POC: NORMAL
URINE BLOOD POC: NORMAL
URINE LEUKOCYTES POC: NEGATIVE
URINE NITRITES POC: NEGATIVE
VALID INTERNAL CONTROL?: YES

## 2022-07-05 PROCEDURE — 99213 OFFICE O/P EST LOW 20 MIN: CPT | Performed by: FAMILY MEDICINE

## 2022-07-05 PROCEDURE — 82272 OCCULT BLD FECES 1-3 TESTS: CPT | Performed by: FAMILY MEDICINE

## 2022-07-05 PROCEDURE — 81002 URINALYSIS NONAUTO W/O SCOPE: CPT | Performed by: FAMILY MEDICINE

## 2022-07-05 NOTE — TELEPHONE ENCOUNTER
----- Message from Rad Maurer sent at 7/5/2022 12:33 PM EDT -----  Subject: Message to Provider    QUESTIONS  Information for Provider? PT HAS CREAM- EUCERIN TOLD  THAT HE WOULD GIVE   HER THE NAME,   ---------------------------------------------------------------------------  --------------  Ruy MULLEN  [phone]; OK to leave message on voicemail  ---------------------------------------------------------------------------  --------------  SCRIPT ANSWERS  Relationship to Patient?  Self

## 2022-07-05 NOTE — PROGRESS NOTES
Dee Dee Bañuelos is a 79 y.o. male who presents to the office today with the following:  No chief complaint on file. HPI  Woke up this am with blood in urine  No pain with urination  Saw Dr Sandy Ram, Urologist 2 mo ago  Had CT scan showing enlarged prostate  Never had kidney stones    Has flare up of rectal pressure  No blood, had oozing of stool 3 d ago  Has bag after Total colectomy  But has uncomfortable pressure  Went to [facility] and got cream for inflammation  Will call re cream    Review of Systems   Constitutional: Negative for fever. Gastrointestinal: Positive for abdominal pain (little in left low area tender), diarrhea and nausea (little this am). Negative for blood in stool, melena and vomiting. Genitourinary: Positive for hematuria and urgency. Negative for dysuria and frequency. See HPI.     Past Medical History:   Diagnosis Date    Bilateral hip pain     Colon polyps 2013    COPD (chronic obstructive pulmonary disease) ([facility] [address] 04/2017    moderate    Depression     Diverticulitis     ED (erectile dysfunction)     Enteritis 03/2018    Essential hypertension, benign 2006    Hernia of abdominal wall 2017    History of DVT (deep vein thrombosis) 08/2018    History of MI (myocardial infarction) 11/10/2016    History of pulmonary embolus (PE) 08/2018    Hypercholesterolemia     IGT (impaired glucose tolerance)     Inguinal hernia 2017    (R)    Irritable bowel syndrome (IBS) 03/2018    Mononeuropathy     femoral nerve left    Paresthesia of left foot     Renal cyst     Smoker     Vitamin D deficiency 04/2021       Past Surgical History:   Procedure Laterality Date    ENDOSCOPY, COLON, DIAGNOSTIC  2014    HX COLONOSCOPY      w\ polyps    HX CORONARY ARTERY BYPASS GRAFT      HX CORONARY ARTERY BYPASS GRAFT  11/21/2016    4 vessel    HX HERNIA REPAIR  51/0385    paraumbilical, 2 hernias    HX OTHER SURGICAL  09\20\2013    nl stress myoview    HX OTHER SURGICAL      Aortic abdominal bypass    HX OTHER SURGICAL  03/2019    Cystoscopy    HX SMALL BOWEL RESECTION  08/2018    13 inches    HX TOTAL COLECTOMY  08/2018    total       Allergies   Allergen Reactions    Amitriptyline Rash    Simvastatin Myalgia     Pt takes this, says he is not allergic       Current Outpatient Medications   Medication Sig    traMADoL (ULTRAM) 50 mg tablet Take 1 Tablet by mouth every eight (8) hours as needed for Pain for up to 15 doses. Max Daily Amount: 150 mg.    metoclopramide HCl (REGLAN) 5 mg tablet TAKE 1 TABLET THREE TIMES A DAY WITH MEALS IF NEEDED    sertraline (ZOLOFT) 100 mg tablet Take 1 Tablet by mouth daily.  tadalafiL (CIALIS) 5 mg tablet Take 5 mg by mouth daily.  rivaroxaban (XARELTO) 20 mg tab tablet Take 1 Tablet by mouth daily (with breakfast).  carvediloL (COREG) 6.25 mg tablet Take 1 Tablet by mouth two (2) times daily (with meals).  ondansetron (ZOFRAN ODT) 4 mg disintegrating tablet Take 4 mg by mouth every eight (8) hours as needed for Nausea or Vomiting.  cyclobenzaprine (FLEXERIL) 10 mg tablet Take 1 Tablet by mouth nightly. As needed    cholecalciferol (VITAMIN D3) (1000 Units /25 mcg) tablet Take  by mouth daily.  cyanocobalamin (VITAMIN B-12) 1,000 mcg tablet Take 1,000 mcg by mouth daily. Taking 500mcg     No current facility-administered medications for this visit.        Social History     Socioeconomic History    Marital status:    Tobacco Use    Smoking status: Former Smoker     Years: 46.00     Types: Cigarettes    Smokeless tobacco: Former User     Quit date: 11/20/2016    Tobacco comment: smokes a few   Vaping Use    Vaping Use: Never used   Substance and Sexual Activity    Alcohol use: No     Comment: rare    Drug use: No    Sexual activity: Yes     Partners: Female   Other Topics Concern    Blood Transfusions No    Sleep Concern Yes     Comment: Works swing shift    Exercise Yes    Seat Belt Yes    Self-Exams Yes Family History   Problem Relation Age of Onset    Cancer Sister         cervical    Hypertension Brother          Physical Exam:  Visit Vitals  BP (!) 150/98 (BP 1 Location: Right arm, BP Patient Position: Sitting, BP Cuff Size: Adult)   Pulse 76   Temp 98 °F (36.7 °C)   Resp 14   Ht 5' 8\" (1.727 m)   Wt 156 lb (70.8 kg)   SpO2 97%   BMI 23.72 kg/m²     Physical Exam  Vitals and nursing note reviewed. HENT:      Head: Normocephalic and atraumatic. Eyes:      Extraocular Movements: Extraocular movements intact. Conjunctiva/sclera: Conjunctivae normal.   Cardiovascular:      Rate and Rhythm: Normal rate and regular rhythm. Heart sounds: Normal heart sounds. Pulmonary:      Effort: Pulmonary effort is normal.      Breath sounds: Normal breath sounds. Abdominal:      General: There is distension. Palpations: Abdomen is soft. Tenderness: There is abdominal tenderness (left low abdomen). There is no right CVA tenderness, left CVA tenderness or guarding. Hernia: A hernia is present. Genitourinary:     Comments: Unable to enter rectum, and no stool present, hemoccult negative except from inflamed perirectal irritation  Musculoskeletal:      Right lower leg: No edema. Left lower leg: No edema. Neurological:      Mental Status: He is alert and oriented to person, place, and time.    Psychiatric:         Mood and Affect: Mood normal.         Behavior: Behavior normal.       Recent Results (from the past 12 hour(s))   AMB POC URINALYSIS DIP STICK MANUAL W/O MICRO    Collection Time: 07/05/22 11:16 AM   Result Value Ref Range    Color (UA POC) Red     Clarity (UA POC) Cloudy     Glucose (UA POC) Negative Negative    Bilirubin (UA POC) 1+ Negative    Ketones (UA POC) Negative Negative    Specific gravity (UA POC) 1.025 1.001 - 1.035    Blood (UA POC) 3+ Negative    pH (UA POC) 6.0 4.6 - 8.0    Protein (UA POC) 2+ Negative    Urobilinogen (UA POC) 0.2 mg/dL 0.2 - 1    Nitrites (UA POC) Negative Negative    Leukocyte esterase (UA POC) Negative Negative   AMB POC FECAL BLOOD, OCCULT, QL 1 CARD    Collection Time: 07/05/22 11:59 AM   Result Value Ref Range    VALID INTERNAL CONTROL POC Yes     Hemoccult (POC) Negative Negative           Assessment/Plan:    ICD-10-CM ICD-9-CM    1. Gross hematuria  R31.0 599.71 AMB POC URINALYSIS DIP STICK MANUAL W/O MICRO      CT ABD PELV WO CONT      CULTURE, URINE   2. Tenesmus (rectal)  R19.8 787.99 AMB POC FECAL BLOOD, OCCULT, QL 1 CARD   3.  Perirectal inflammation  K62.89 569.49      F/U with Urologist  Awaiting CT before setting up with GI specialist      Janice Herbert MD

## 2022-07-05 NOTE — PROGRESS NOTES
1. \"Have you been to the ER, urgent care clinic since your last visit? Hospitalized since your last visit? \" No    2. \"Have you seen or consulted any other health care providers outside of the [address] [place] [address] since your last visit? \" No     3. For patients aged 39-70: Has the patient had a colonoscopy / FIT/ Cologuard? Yes - no Care Gap present      If the patient is female:    4. For patients aged 41-77: Has the patient had a mammogram within the past 2 years? NA - based on age or sex      11. For patients aged 21-65: Has the patient had a pap smear?  NA - based on age or sex

## 2022-07-07 LAB
BACTERIA SPEC CULT: NORMAL
SERVICE CMNT-IMP: NORMAL

## 2022-07-07 NOTE — PROGRESS NOTES
I have called and talked to this patient. I have verified the patient's name and . I have discussed the lab results and recommendations from Dr aMeve Hill. Patient verbalizes understanding at this time.

## 2022-07-18 ENCOUNTER — TELEPHONE (OUTPATIENT)
Dept: FAMILY MEDICINE CLINIC | Age: 67
End: 2022-07-18

## 2022-07-18 NOTE — TELEPHONE ENCOUNTER
Patient is scheduled for his CT on 7/20. He received a phone call today from the hospital stating that they have still not gotten the  Authorization. Please call him [phone].

## 2022-07-19 NOTE — TELEPHONE ENCOUNTER
Spoke with pt regarding prior auth for his CT Scan. It will probably need a peer to peer review. He may need to reschedule test as the faxed documents from [employer] will not be processed by the time of the scan. Pt verballizes understanding.  I will call back at 3pm if I don't hear from insurance company by then

## 2022-07-26 NOTE — TELEPHONE ENCOUNTER
Lm for pt to let him know that his Ct scan of Abdomen and Pelvis rescheduled for 08/03/2022 has been authorized by his insurance company. The auth # is [id number] valid from 08/03/2022 thru 09/02/2022. I also left message at [employer] verification with the same information.

## 2022-08-15 DIAGNOSIS — Z86.711 HISTORY OF PULMONARY EMBOLUS (PE): ICD-10-CM

## 2022-08-15 RX ORDER — RIVAROXABAN 20 MG/1
TABLET, FILM COATED ORAL
Qty: 90 TABLET | Refills: 1 | Status: SHIPPED | OUTPATIENT
Start: 2022-08-15

## 2022-08-16 DIAGNOSIS — I10 ESSENTIAL HYPERTENSION: ICD-10-CM

## 2022-08-16 RX ORDER — CARVEDILOL 6.25 MG/1
6.25 TABLET ORAL 2 TIMES DAILY WITH MEALS
Qty: 180 TABLET | Refills: 0 | Status: SHIPPED | OUTPATIENT
Start: 2022-08-16 | End: 2022-08-18 | Stop reason: SDUPTHER

## 2022-08-16 NOTE — TELEPHONE ENCOUNTER
Requested Prescriptions     Pending Prescriptions Disp Refills    carvediloL (COREG) 6.25 mg tablet 180 Tablet 1     Sig: Take 1 Tablet by mouth two (2) times daily (with meals).

## 2022-08-18 ENCOUNTER — OFFICE VISIT (OUTPATIENT)
Dept: FAMILY MEDICINE CLINIC | Age: 67
End: 2022-08-18
Payer: MEDICARE

## 2022-08-18 VITALS
HEART RATE: 85 BPM | TEMPERATURE: 97.9 F | DIASTOLIC BLOOD PRESSURE: 104 MMHG | SYSTOLIC BLOOD PRESSURE: 198 MMHG | HEIGHT: 68 IN | BODY MASS INDEX: 23.79 KG/M2 | WEIGHT: 157 LBS | RESPIRATION RATE: 12 BRPM | OXYGEN SATURATION: 99 %

## 2022-08-18 DIAGNOSIS — I10 ESSENTIAL HYPERTENSION: Primary | ICD-10-CM

## 2022-08-18 DIAGNOSIS — Z23 ENCOUNTER FOR IMMUNIZATION: ICD-10-CM

## 2022-08-18 DIAGNOSIS — G58.9 MONONEUROPATHY: ICD-10-CM

## 2022-08-18 DIAGNOSIS — M79.605 LEFT LEG PAIN: ICD-10-CM

## 2022-08-18 PROCEDURE — 90677 PCV20 VACCINE IM: CPT | Performed by: FAMILY MEDICINE

## 2022-08-18 PROCEDURE — 99213 OFFICE O/P EST LOW 20 MIN: CPT | Performed by: FAMILY MEDICINE

## 2022-08-18 PROCEDURE — G0009 ADMIN PNEUMOCOCCAL VACCINE: HCPCS | Performed by: FAMILY MEDICINE

## 2022-08-18 RX ORDER — CYCLOBENZAPRINE HCL 10 MG
10 TABLET ORAL
Qty: 90 TABLET | Refills: 1 | Status: SHIPPED | OUTPATIENT
Start: 2022-08-18

## 2022-08-18 RX ORDER — CARVEDILOL 6.25 MG/1
12.5 TABLET ORAL 2 TIMES DAILY WITH MEALS
Qty: 10 TABLET | Refills: 0 | Status: SHIPPED | OUTPATIENT
Start: 2022-08-18 | End: 2022-10-25 | Stop reason: SDUPTHER

## 2022-08-18 RX ORDER — TRAMADOL HYDROCHLORIDE 50 MG/1
50 TABLET ORAL
Qty: 15 TABLET | Refills: 0 | Status: SHIPPED | OUTPATIENT
Start: 2022-08-18 | End: 2022-09-15

## 2022-08-18 NOTE — PROGRESS NOTES
1. \"Have you been to the ER, urgent care clinic since your last visit? Hospitalized since your last visit? \" No    2. \"Have you seen or consulted any other health care providers outside of the [address] [place] [address] since your last visit? \" No     3. For patients aged 39-70: Has the patient had a colonoscopy / FIT/ Cologuard? Yes - no Care Gap present      If the patient is female:    4. For patients aged 41-77: Has the patient had a mammogram within the past 2 years? NA - based on age or sex      11. For patients aged 21-65: Has the patient had a pap smear?  NA - based on age or sex

## 2022-08-18 NOTE — PROGRESS NOTES
Blu Finley is a 79 y.o. male who presents to the office today with the following:  Chief Complaint   Patient presents with    Medication Evaluation     refills    Hypertension     Follow up       HPI  HTN  No BP checks at home  Out of Coreg for 2 d  And has been taken only one a day    Left leg neuropathy chronic  Tried CBD gummies and helped  Still would like refill of Tramadol since cheaper  Pain in left leg horrendous at times per pt  Feels like baseball hitting him  Taking it prn and it is helping too  But would like to get off it  Has tried other meds and all not helping much or had SE with them    ROS  See HPI.     Past Medical History:   Diagnosis Date    Bilateral hip pain     Colon polyps 2013    COPD (chronic obstructive pulmonary disease) ([address] 04/2017    moderate    Depression     Diverticulitis     ED (erectile dysfunction)     Enteritis 03/2018    Essential hypertension, benign 2006    Hernia of abdominal wall 2017    History of DVT (deep vein thrombosis) 08/2018    History of MI (myocardial infarction) 11/10/2016    History of pulmonary embolus (PE) 08/2018    Hypercholesterolemia     IGT (impaired glucose tolerance)     Inguinal hernia 2017    (R)    Irritable bowel syndrome (IBS) 03/2018    Mononeuropathy     femoral nerve left    Paresthesia of left foot     Renal cyst     Smoker     Vitamin D deficiency 04/2021       Past Surgical History:   Procedure Laterality Date    ENDOSCOPY, COLON, DIAGNOSTIC  2014    HX COLONOSCOPY      w\ polyps    HX CORONARY ARTERY BYPASS GRAFT      HX CORONARY ARTERY BYPASS GRAFT  11/21/2016    4 vessel    HX HERNIA REPAIR  60/1485    paraumbilical, 2 hernias    HX OTHER SURGICAL  15\82\1464    nl stress myoview    HX OTHER SURGICAL      Aortic abdominal bypass    HX OTHER SURGICAL  03/2019    Cystoscopy    HX SMALL BOWEL RESECTION  08/2018    13 inches    HX TOTAL COLECTOMY  08/2018    total       Allergies   Allergen Reactions    Amitriptyline Rash    Simvastatin Myalgia     Pt takes this, says he is not allergic       Current Outpatient Medications   Medication Sig    traMADoL (ULTRAM) 50 mg tablet Take 1 Tablet by mouth every eight (8) hours as needed for Pain for up to 15 doses. Max Daily Amount: 150 mg.    cyclobenzaprine (FLEXERIL) 10 mg tablet Take 1 Tablet by mouth nightly. As needed    carvediloL (COREG) 6.25 mg tablet Take 1 Tablet by mouth two (2) times daily (with meals). Xarelto 20 mg tab tablet TAKE 1 TABLET EVERY DAY  WITH  BREAKFAST    metoclopramide HCl (REGLAN) 5 mg tablet TAKE 1 TABLET THREE TIMES A DAY WITH MEALS IF NEEDED    sertraline (ZOLOFT) 100 mg tablet Take 1 Tablet by mouth daily. tadalafiL (CIALIS) 5 mg tablet Take 5 mg by mouth daily. ondansetron (ZOFRAN ODT) 4 mg disintegrating tablet Take 4 mg by mouth every eight (8) hours as needed for Nausea or Vomiting. cholecalciferol (VITAMIN D3) (1000 Units /25 mcg) tablet Take  by mouth daily. cyanocobalamin 1,000 mcg tablet Take 1,000 mcg by mouth daily. Taking 500mcg     No current facility-administered medications for this visit.        Social History     Socioeconomic History    Marital status:    Tobacco Use    Smoking status: Former     Years: 46.00     Types: Cigarettes    Smokeless tobacco: Former     Quit date: 11/20/2016    Tobacco comments:     smokes a few   Vaping Use    Vaping Use: Never used   Substance and Sexual Activity    Alcohol use: No     Comment: rare    Drug use: No    Sexual activity: Yes     Partners: Female   Other Topics Concern    Blood Transfusions No    Sleep Concern Yes     Comment: Works swing shift    Exercise Yes    Seat Belt Yes    Self-Exams Yes       Family History   Problem Relation Age of Onset    Cancer Sister         cervical    Hypertension Brother          Physical Exam:  Visit Vitals  BP (!) 220/127 (BP 1 Location: Right upper arm, BP Patient Position: Sitting, BP Cuff Size: Adult)   Pulse 85   Temp 97.9 °F (36.6 °C)   Resp 12   Ht 5' 8\" (1.727 m)   Wt 157 lb (71.2 kg)   SpO2 99%   BMI 23.87 kg/m²     Physical Exam  Vitals and nursing note reviewed. HENT:      Head: Normocephalic and atraumatic. Eyes:      Extraocular Movements: Extraocular movements intact. Conjunctiva/sclera: Conjunctivae normal.   Cardiovascular:      Rate and Rhythm: Normal rate and regular rhythm. Heart sounds: Normal heart sounds. Pulmonary:      Effort: Pulmonary effort is normal.      Breath sounds: Normal breath sounds. Musculoskeletal:         General: Tenderness (left medial leg) present. Right lower leg: No edema. Left lower leg: No edema. Skin:     General: Skin is warm and dry. Neurological:      Mental Status: He is alert and oriented to person, place, and time. Psychiatric:         Mood and Affect: Mood normal.         Behavior: Behavior normal.       Assessment/Plan:    ICD-10-CM ICD-9-CM    1. Essential hypertension  I10 401.9       2. Mononeuropathy  G58.9 355.9 traMADoL (ULTRAM) 50 mg tablet      cyclobenzaprine (FLEXERIL) 10 mg tablet      3. Left leg pain  M79.605 729.5 cyclobenzaprine (FLEXERIL) 10 mg tablet      4.  Encounter for immunization  Z23 V03.89 PNEUMOCOCCAL, PCV20, PREVNAR 20, (AGE 18 YRS+), IM, PF        Restart Coreg bid, 12.5 mg for a few days then usual dose  Recheck in 2 w      Reji Vazquez MD

## 2022-08-19 ENCOUNTER — TELEPHONE (OUTPATIENT)
Dept: FAMILY MEDICINE CLINIC | Age: 67
End: 2022-08-19

## 2022-08-19 NOTE — TELEPHONE ENCOUNTER
Pt comes in to state he got his 10 pill supply for the carvedilol 6.25 mg. However, [facility] [address] [place] [address] [facility] If you can sen it today, Esvin can get his rx out to him before he runs out of the 10 pills. If not, please send short supply to [facility] while he is waiting for the [facility] mail order.

## 2022-08-19 NOTE — TELEPHONE ENCOUNTER
I have called and talked to this patient and his mail order pharmacy. I have given a verbal order for:    carvediloL (COREG) 6.25 mg tablet [[id number]]     Order Details  Dose: 12.5 mg Route: Oral Frequency: 2 TIMES DAILY WITH MEALS   Dispense Quantity: 90 Tablet Refills: 0          Sig: Take 2 Tablets by mouth two (2) times daily (with meals). Verbal read back and verification by pharmacist Radha Cintron and myself. Patient advised and verbalizes understanding.

## 2022-08-23 DIAGNOSIS — I10 ESSENTIAL HYPERTENSION: ICD-10-CM

## 2022-08-23 RX ORDER — CARVEDILOL 6.25 MG/1
6.25 TABLET ORAL 2 TIMES DAILY WITH MEALS
Qty: 180 TABLET | Refills: 0 | Status: SHIPPED | OUTPATIENT
Start: 2022-08-23 | End: 2022-10-25

## 2022-08-25 DIAGNOSIS — I10 ESSENTIAL HYPERTENSION: ICD-10-CM

## 2022-08-25 RX ORDER — CARVEDILOL 6.25 MG/1
12.5 TABLET ORAL 2 TIMES DAILY WITH MEALS
Qty: 10 TABLET | Refills: 0 | OUTPATIENT
Start: 2022-08-25

## 2022-08-25 NOTE — TELEPHONE ENCOUNTER
Requested Prescriptions     Pending Prescriptions Disp Refills    carvediloL (COREG) 6.25 mg tablet 10 Tablet 0     Sig: Take 2 Tablets by mouth two (2) times daily (with meals). Patient will need 10 more pills sent to the [facility].   Still has not received mail order

## 2022-09-01 ENCOUNTER — OFFICE VISIT (OUTPATIENT)
Dept: FAMILY MEDICINE CLINIC | Age: 67
End: 2022-09-01
Payer: MEDICARE

## 2022-09-01 VITALS
BODY MASS INDEX: 23.49 KG/M2 | SYSTOLIC BLOOD PRESSURE: 120 MMHG | OXYGEN SATURATION: 97 % | HEIGHT: 68 IN | WEIGHT: 155 LBS | RESPIRATION RATE: 12 BRPM | DIASTOLIC BLOOD PRESSURE: 70 MMHG | HEART RATE: 77 BPM | TEMPERATURE: 98 F

## 2022-09-01 DIAGNOSIS — I10 ESSENTIAL HYPERTENSION: Primary | ICD-10-CM

## 2022-09-01 PROCEDURE — 99213 OFFICE O/P EST LOW 20 MIN: CPT | Performed by: FAMILY MEDICINE

## 2022-09-01 NOTE — PROGRESS NOTES
Cynthia Kwon is a 79 y.o. male who presents to the office today with the following:  Chief Complaint   Patient presents with    Hypertension     Follow up       HPI  HTN  Pt restarted Coreg 6.25 mg bid now  Here for recheck  No BP checks at home  No SE with it  No fatigue with it        Review of Systems   Constitutional: Negative. Cardiovascular:  Negative for chest pain. Neurological:  Positive for dizziness (2x) and headaches (occ). See HPI.     Past Medical History:   Diagnosis Date    Bilateral hip pain     Colon polyps 2013    COPD (chronic obstructive pulmonary disease) ([address] 04/2017    moderate    Depression     Diverticulitis     ED (erectile dysfunction)     Enteritis 03/2018    Essential hypertension, benign 2006    Hernia of abdominal wall 2017    History of DVT (deep vein thrombosis) 08/2018    History of MI (myocardial infarction) 11/10/2016    History of pulmonary embolus (PE) 08/2018    Hypercholesterolemia     IGT (impaired glucose tolerance)     Inguinal hernia 2017    (R)    Irritable bowel syndrome (IBS) 03/2018    Mononeuropathy     femoral nerve left    Paresthesia of left foot     Renal cyst     Smoker     Vitamin D deficiency 04/2021       Past Surgical History:   Procedure Laterality Date    ENDOSCOPY, COLON, DIAGNOSTIC  2014    HX COLONOSCOPY      w\ polyps    HX CORONARY ARTERY BYPASS GRAFT      HX CORONARY ARTERY BYPASS GRAFT  11/21/2016    4 vessel    HX HERNIA REPAIR  43/7320    paraumbilical, 2 hernias    HX OTHER SURGICAL  95\95\8450    nl stress myoview    HX OTHER SURGICAL      Aortic abdominal bypass    HX OTHER SURGICAL  03/2019    Cystoscopy    HX SMALL BOWEL RESECTION  08/2018    13 inches    HX TOTAL COLECTOMY  08/2018    total       Allergies   Allergen Reactions    Amitriptyline Rash    Simvastatin Myalgia     Pt takes this, says he is not allergic       Current Outpatient Medications   Medication Sig    carvediloL (COREG) 6.25 mg tablet Take 1 Tablet by mouth two (2) times daily (with meals). traMADoL (ULTRAM) 50 mg tablet Take 1 Tablet by mouth every eight (8) hours as needed for Pain for up to 15 doses. Max Daily Amount: 150 mg.    cyclobenzaprine (FLEXERIL) 10 mg tablet Take 1 Tablet by mouth nightly. As needed    carvediloL (COREG) 6.25 mg tablet Take 2 Tablets by mouth two (2) times daily (with meals). Xarelto 20 mg tab tablet TAKE 1 TABLET EVERY DAY  WITH  BREAKFAST    metoclopramide HCl (REGLAN) 5 mg tablet TAKE 1 TABLET THREE TIMES A DAY WITH MEALS IF NEEDED    sertraline (ZOLOFT) 100 mg tablet Take 1 Tablet by mouth daily. tadalafiL (CIALIS) 5 mg tablet Take 5 mg by mouth daily. ondansetron (ZOFRAN ODT) 4 mg disintegrating tablet Take 4 mg by mouth every eight (8) hours as needed for Nausea or Vomiting. cholecalciferol (VITAMIN D3) (1000 Units /25 mcg) tablet Take  by mouth daily. cyanocobalamin 1,000 mcg tablet Take 1,000 mcg by mouth daily. Taking 500mcg     No current facility-administered medications for this visit. Social History     Socioeconomic History    Marital status:    Tobacco Use    Smoking status: Former     Years: 46.00     Types: Cigarettes    Smokeless tobacco: Former     Quit date: 11/20/2016    Tobacco comments:     smokes a few   Vaping Use    Vaping Use: Never used   Substance and Sexual Activity    Alcohol use: No     Comment: rare    Drug use: No    Sexual activity: Yes     Partners: Female   Other Topics Concern    Blood Transfusions No    Sleep Concern Yes     Comment: Works swing shift    Exercise Yes    Seat Belt Yes    Self-Exams Yes       Family History   Problem Relation Age of Onset    Cancer Sister         cervical    Hypertension Brother          Physical Exam:  Visit Vitals  /70   Pulse 77   Temp 98 °F (36.7 °C)   Resp 12   Ht 5' 8\" (1.727 m)   Wt 155 lb (70.3 kg)   SpO2 97%   BMI 23.57 kg/m²     Physical Exam  Vitals and nursing note reviewed. HENT:      Head: Normocephalic and atraumatic. Eyes:      Extraocular Movements: Extraocular movements intact. Conjunctiva/sclera: Conjunctivae normal.   Cardiovascular:      Rate and Rhythm: Normal rate and regular rhythm. Heart sounds: Normal heart sounds. Pulmonary:      Effort: Pulmonary effort is normal.      Breath sounds: Normal breath sounds. Musculoskeletal:      Right lower leg: No edema. Left lower leg: No edema. Neurological:      Mental Status: He is alert and oriented to person, place, and time. Psychiatric:         Mood and Affect: Mood normal.         Behavior: Behavior normal.       Assessment/Plan:    ICD-10-CM ICD-9-CM    1.  Essential hypertension  I10 401.9         BP good now  Cont current medication and monitor at home      Morteza Torres MD

## 2022-10-03 ENCOUNTER — TELEPHONE (OUTPATIENT)
Dept: FAMILY MEDICINE CLINIC | Age: 67
End: 2022-10-03

## 2022-10-03 ENCOUNTER — NURSE TRIAGE (OUTPATIENT)
Dept: OTHER | Facility: CLINIC | Age: 67
End: 2022-10-03

## 2022-10-03 NOTE — TELEPHONE ENCOUNTER
Patient says his blood pressure is running a little high and thinks his medication needs to be increased.   Please call him at [phone]

## 2022-10-03 NOTE — TELEPHONE ENCOUNTER
Spoke to patient after verifying two identifies, states a provider for [id number] came out today for a Home evaluation. His blood pressure sitting was 173/112 and standing was 190/117. Wants to know is his medication needs to be changed or does he need to schedule and follow up visit. Informed I would check with the provider. Acknowledged understanding.

## 2022-10-04 NOTE — TELEPHONE ENCOUNTER
I have called and left a detailed message for this patient on his personalized VMB.   Per Dr Jarvis Boucher:    Call pt, have him follow a low salt diet and monitor his BP in am for the next few days and make apt to review the diary and the need for the adjustment of the medication

## 2022-10-25 ENCOUNTER — OFFICE VISIT (OUTPATIENT)
Dept: FAMILY MEDICINE CLINIC | Age: 67
End: 2022-10-25
Payer: MEDICARE

## 2022-10-25 VITALS
DIASTOLIC BLOOD PRESSURE: 80 MMHG | WEIGHT: 161.25 LBS | RESPIRATION RATE: 14 BRPM | OXYGEN SATURATION: 97 % | SYSTOLIC BLOOD PRESSURE: 176 MMHG | HEART RATE: 86 BPM | HEIGHT: 68 IN | TEMPERATURE: 97.2 F | BODY MASS INDEX: 24.44 KG/M2

## 2022-10-25 DIAGNOSIS — I10 ESSENTIAL HYPERTENSION: ICD-10-CM

## 2022-10-25 DIAGNOSIS — N18.30 STAGE 3 CHRONIC KIDNEY DISEASE, UNSPECIFIED WHETHER STAGE 3A OR 3B CKD (HCC): ICD-10-CM

## 2022-10-25 DIAGNOSIS — S93.402A MODERATE LEFT ANKLE SPRAIN, INITIAL ENCOUNTER: Primary | ICD-10-CM

## 2022-10-25 PROCEDURE — 3074F SYST BP LT 130 MM HG: CPT | Performed by: FAMILY MEDICINE

## 2022-10-25 PROCEDURE — G8754 DIAS BP LESS 90: HCPCS | Performed by: FAMILY MEDICINE

## 2022-10-25 PROCEDURE — 99213 OFFICE O/P EST LOW 20 MIN: CPT | Performed by: FAMILY MEDICINE

## 2022-10-25 PROCEDURE — 1101F PT FALLS ASSESS-DOCD LE1/YR: CPT | Performed by: FAMILY MEDICINE

## 2022-10-25 PROCEDURE — G9711 PT HX TOT COL OR COLON CA: HCPCS | Performed by: FAMILY MEDICINE

## 2022-10-25 PROCEDURE — G8420 CALC BMI NORM PARAMETERS: HCPCS | Performed by: FAMILY MEDICINE

## 2022-10-25 PROCEDURE — G8427 DOCREV CUR MEDS BY ELIG CLIN: HCPCS | Performed by: FAMILY MEDICINE

## 2022-10-25 PROCEDURE — 3078F DIAST BP <80 MM HG: CPT | Performed by: FAMILY MEDICINE

## 2022-10-25 PROCEDURE — 1123F ACP DISCUSS/DSCN MKR DOCD: CPT | Performed by: FAMILY MEDICINE

## 2022-10-25 PROCEDURE — G8510 SCR DEP NEG, NO PLAN REQD: HCPCS | Performed by: FAMILY MEDICINE

## 2022-10-25 PROCEDURE — G8536 NO DOC ELDER MAL SCRN: HCPCS | Performed by: FAMILY MEDICINE

## 2022-10-25 PROCEDURE — G8753 SYS BP > OR = 140: HCPCS | Performed by: FAMILY MEDICINE

## 2022-10-25 RX ORDER — CARVEDILOL 12.5 MG/1
12.5 TABLET ORAL 2 TIMES DAILY WITH MEALS
Qty: 180 TABLET | Refills: 3 | Status: SHIPPED | OUTPATIENT
Start: 2022-10-25 | End: 2022-11-10 | Stop reason: DRUGHIGH

## 2022-10-25 RX ORDER — TRAMADOL HYDROCHLORIDE 50 MG/1
50 TABLET ORAL
Qty: 20 TABLET | Refills: 0 | Status: SHIPPED | OUTPATIENT
Start: 2022-10-25 | End: 2022-10-28

## 2022-10-25 NOTE — PROGRESS NOTES
1. \"Have you been to the ER, urgent care clinic since your last visit? Hospitalized since your last visit? \"  Yes - [facility] ER Visit Sprain Left Ankle     2. \"Have you seen or consulted any other health care providers outside of the [address] [place] [address] since your last visit? \" No     3. For patients aged 39-70: Has the patient had a colonoscopy / FIT/ Cologuard? No      If the patient is female:    4. For patients aged 41-77: Has the patient had a mammogram within the past 2 years? NA - based on age or sex      11. For patients aged 21-65: Has the patient had a pap smear? NA - based on age or sex    10/25/2022      Chief Complaint   Patient presents with    Sprain     Left - [facility] ER Visit 10/24/22    Blood Pressure Check         History of Present Illness:        Yumiko Sotomayor is a 79 y.o. male with moderate ankle sprain yesterday seen in ED with negative xrays. Lots of pain, on xarelto, so can't take NSAIDs. BP has been high. Allergies   Allergen Reactions    Amitriptyline Rash    Simvastatin Myalgia     Pt takes this, says he is not allergic       Current Outpatient Medications   Medication Sig    carvediloL (COREG) 12.5 mg tablet Take 1 Tablet by mouth two (2) times daily (with meals). traMADoL (ULTRAM) 50 mg tablet Take 1 Tablet by mouth every six (6) hours as needed for Pain for up to 3 days. Max Daily Amount: 200 mg.    sertraline (ZOLOFT) 100 mg tablet TAKE 1 TABLET EVERY DAY    cyclobenzaprine (FLEXERIL) 10 mg tablet Take 1 Tablet by mouth nightly. As needed    Xarelto 20 mg tab tablet TAKE 1 TABLET EVERY DAY  WITH  BREAKFAST    metoclopramide HCl (REGLAN) 5 mg tablet TAKE 1 TABLET THREE TIMES A DAY WITH MEALS IF NEEDED    tadalafiL (CIALIS) 5 mg tablet Take 5 mg by mouth daily. cholecalciferol (VITAMIN D3) (1000 Units /25 mcg) tablet Take  by mouth daily. cyanocobalamin 1,000 mcg tablet Take 1,000 mcg by mouth daily.  Taking 500mcg    ondansetron (ZOFRAN ODT) 4 mg disintegrating tablet Take 4 mg by mouth every eight (8) hours as needed for Nausea or Vomiting. (Patient not taking: Reported on 10/25/2022)     No current facility-administered medications for this visit. Physical Examination:    Visit Vitals  BP (!) 176/80 (BP 1 Location: Left upper arm, BP Patient Position: Sitting, BP Cuff Size: Adult)   Pulse 86   Temp 97.2 °F (36.2 °C) (Oral)   Resp 14   Ht 5' 8\" (1.727 m)   Wt 161 lb 4 oz (73.1 kg)   SpO2 97%   BMI 24.52 kg/m²      General:  Alert, cooperative, no distress. HEENT:  Normocephalic, without obvious abnormality, atraumatic. Conjunctivae/corneas clear. Pupils equal, round, reactive to light. Extraocular movements intact. TMs and external canals normal bilaterally. Nasal mucosa and oropharynx clear. Lungs: Clear to auscultation bilaterally. Chest wall:  No tenderness or deformity. Heart:  Regular rate and rhythm, S1, S2 normal, no murmur, click, rub, or gallop. Abdomen:   Soft, non-tender. Bowel sounds normal. No masses. No organomegaly. Extremities: Lateral ecchymoses of L ankle. Mild swelling   Pulses: 2+ and symmetric all extremities. Skin: Skin color, texture, turgor normal. No rashes or lesions. Lymph nodes: Cervical, supraclavicular, and axillary nodes normal.   Neurologic: CNII-XII intact. Normal strength, sensation, and reflexes throughout. ASSESSMENT AND PLAN    1. Essential hypertension  Increase carvedilol to 12.5 mg bid. Recheck 4 weeks  - carvediloL (COREG) 12.5 mg tablet; Take 1 Tablet by mouth two (2) times daily (with meals). Dispense: 180 Tablet; Refill: 3        3. Moderate left ankle sprain, initial encounter    - traMADoL (ULTRAM) 50 mg tablet; Take 1 Tablet by mouth every six (6) hours as needed for Pain for up to 3 days. Max Daily Amount: 200 mg. Dispense: 20 Tablet; Refill: 0              Orders Placed This Encounter    carvediloL (COREG) 12.5 mg tablet     Sig: Take 1 Tablet by mouth two (2) times daily (with meals).      Dispense:  180 Tablet     Refill:  3    traMADoL (ULTRAM) 50 mg tablet     Sig: Take 1 Tablet by mouth every six (6) hours as needed for Pain for up to 3 days. Max Daily Amount: 200 mg.      Dispense:  20 Tablet     Refill:  0           Jackee Bence, MD

## 2022-11-08 ENCOUNTER — TELEPHONE (OUTPATIENT)
Dept: FAMILY MEDICINE CLINIC | Age: 67
End: 2022-11-08

## 2022-11-08 NOTE — TELEPHONE ENCOUNTER
Pharmacy calls to request clarification on a prescription. On 08/19/2022  you prescribed 6.25 mg of coreg with directions that say take 2 tablets twice daily. On 8/23/2022 you prescribed 6.25 mg of coreg with directions that say take 1 tablet twice daily. Pharmacy wants to know which is the correct dose. Best number for call back is .   You can use order number as reference for call [phone]

## 2022-11-10 DIAGNOSIS — I10 ESSENTIAL HYPERTENSION: Primary | ICD-10-CM

## 2022-11-10 RX ORDER — CARVEDILOL 6.25 MG/1
6.25 TABLET ORAL 2 TIMES DAILY WITH MEALS
Qty: 180 TABLET | Refills: 0 | Status: SHIPPED | OUTPATIENT
Start: 2022-11-10

## 2022-12-13 DIAGNOSIS — R11.0 NAUSEA: ICD-10-CM

## 2022-12-13 RX ORDER — METOCLOPRAMIDE 5 MG/1
TABLET ORAL
Qty: 90 TABLET | Refills: 0 | Status: SHIPPED | OUTPATIENT
Start: 2022-12-13

## 2022-12-13 NOTE — TELEPHONE ENCOUNTER
Pharmacy calls to request for Mr. Minerva Meléndez.   Requested Prescriptions     Pending Prescriptions Disp Refills    metoclopramide HCl (REGLAN) 5 mg tablet 90 Tablet 0     Sig: TAKE 1 TABLET THREE TIMES A DAY WITH MEALS IF NEEDED

## 2023-01-03 ENCOUNTER — OFFICE VISIT (OUTPATIENT)
Dept: FAMILY MEDICINE CLINIC | Age: 68
End: 2023-01-03
Payer: MEDICARE

## 2023-01-03 VITALS
RESPIRATION RATE: 12 BRPM | TEMPERATURE: 97.9 F | BODY MASS INDEX: 24.86 KG/M2 | HEIGHT: 68 IN | WEIGHT: 164 LBS | HEART RATE: 72 BPM | SYSTOLIC BLOOD PRESSURE: 130 MMHG | OXYGEN SATURATION: 97 % | DIASTOLIC BLOOD PRESSURE: 82 MMHG

## 2023-01-03 DIAGNOSIS — G58.9 MONONEUROPATHY: ICD-10-CM

## 2023-01-03 DIAGNOSIS — N18.30 STAGE 3 CHRONIC KIDNEY DISEASE, UNSPECIFIED WHETHER STAGE 3A OR 3B CKD (HCC): ICD-10-CM

## 2023-01-03 DIAGNOSIS — R73.09 ELEVATED GLUCOSE: ICD-10-CM

## 2023-01-03 DIAGNOSIS — J44.9 CHRONIC OBSTRUCTIVE PULMONARY DISEASE, UNSPECIFIED COPD TYPE (HCC): ICD-10-CM

## 2023-01-03 DIAGNOSIS — I25.118 CORONARY ARTERY DISEASE OF NATIVE HEART WITH STABLE ANGINA PECTORIS, UNSPECIFIED VESSEL OR LESION TYPE (HCC): ICD-10-CM

## 2023-01-03 DIAGNOSIS — S93.402A MODERATE LEFT ANKLE SPRAIN, INITIAL ENCOUNTER: ICD-10-CM

## 2023-01-03 DIAGNOSIS — I10 ESSENTIAL HYPERTENSION: Primary | ICD-10-CM

## 2023-01-03 PROCEDURE — 1123F ACP DISCUSS/DSCN MKR DOCD: CPT | Performed by: FAMILY MEDICINE

## 2023-01-03 PROCEDURE — 1101F PT FALLS ASSESS-DOCD LE1/YR: CPT | Performed by: FAMILY MEDICINE

## 2023-01-03 PROCEDURE — 99214 OFFICE O/P EST MOD 30 MIN: CPT | Performed by: FAMILY MEDICINE

## 2023-01-03 PROCEDURE — G8432 DEP SCR NOT DOC, RNG: HCPCS | Performed by: FAMILY MEDICINE

## 2023-01-03 PROCEDURE — G8420 CALC BMI NORM PARAMETERS: HCPCS | Performed by: FAMILY MEDICINE

## 2023-01-03 PROCEDURE — 36415 COLL VENOUS BLD VENIPUNCTURE: CPT | Performed by: FAMILY MEDICINE

## 2023-01-03 PROCEDURE — G9711 PT HX TOT COL OR COLON CA: HCPCS | Performed by: FAMILY MEDICINE

## 2023-01-03 PROCEDURE — 3075F SYST BP GE 130 - 139MM HG: CPT | Performed by: FAMILY MEDICINE

## 2023-01-03 PROCEDURE — G8536 NO DOC ELDER MAL SCRN: HCPCS | Performed by: FAMILY MEDICINE

## 2023-01-03 PROCEDURE — G8427 DOCREV CUR MEDS BY ELIG CLIN: HCPCS | Performed by: FAMILY MEDICINE

## 2023-01-03 PROCEDURE — 3079F DIAST BP 80-89 MM HG: CPT | Performed by: FAMILY MEDICINE

## 2023-01-03 RX ORDER — TRAMADOL HYDROCHLORIDE 50 MG/1
50 TABLET ORAL
Qty: 20 TABLET | Refills: 0 | Status: SHIPPED | OUTPATIENT
Start: 2023-01-03 | End: 2023-01-06

## 2023-01-03 NOTE — PROGRESS NOTES
1. \"Have you been to the ER, urgent care clinic since your last visit? Hospitalized since your last visit? \" No    2. \"Have you seen or consulted any other health care providers outside of the [address] [place] [address] since your last visit? \" No     3. For patients aged 39-70: Has the patient had a colonoscopy / FIT/ Cologuard? Yes - no Care Gap present      If the patient is female:    4. For patients aged 41-77: Has the patient had a mammogram within the past 2 years? NA - based on age or sex      11. For patients aged 21-65: Has the patient had a pap smear?  NA - based on age or sex

## 2023-01-03 NOTE — PROGRESS NOTES
Jose Manuel Keys is a 79 y.o. male who presents to the office today with the following:  Chief Complaint   Patient presents with    Hypertension     Follow up       HPI  Elevated Glucose  Last HbA1C 5.7  Due for recheck    Hx of renal ds  Last Creatinine normal    COPD  Not using inhalers  SOB with long walk only  Still smoking    HTN  No BP checks at home  Feeling better since BP meds up    CAD  stable    Left leg Neuropathy from knee to foot  Since previous surgery  Using a cane  Has been falling few times only  Walking with metal detector 1.5 miles then  Giving him a fit  Would like to see pain specialist  And nees a refill of Ultram  Taking it prn only    Hx of Colectomy  Occ has gas and abdominal pain  Eats one meal now  Weight stable    Review of Systems   Constitutional:  Negative for weight loss. Respiratory:  Negative for shortness of breath. Cardiovascular:  Negative for chest pain. Gastrointestinal:  Positive for abdominal pain. Musculoskeletal:  Positive for myalgias. Neurological:  Negative for dizziness and headaches. See HPI.     Past Medical History:   Diagnosis Date    Bilateral hip pain     Colon polyps 2013    COPD (chronic obstructive pulmonary disease) ([address] 04/2017    moderate    Depression     Diverticulitis     ED (erectile dysfunction)     Enteritis 03/2018    Essential hypertension, benign 2006    Hernia of abdominal wall 2017    History of DVT (deep vein thrombosis) 08/2018    History of MI (myocardial infarction) 11/10/2016    History of pulmonary embolus (PE) 08/2018    Hypercholesterolemia     IGT (impaired glucose tolerance)     Inguinal hernia 2017    (R)    Irritable bowel syndrome (IBS) 03/2018    Mononeuropathy     femoral nerve left    Paresthesia of left foot     Renal cyst     Smoker     Vitamin D deficiency 04/2021       Past Surgical History:   Procedure Laterality Date    ENDOSCOPY, COLON, DIAGNOSTIC  2014    HX COLONOSCOPY      w\ polyps    HX CORONARY ARTERY BYPASS GRAFT      HX CORONARY ARTERY BYPASS GRAFT  11/21/2016    4 vessel    HX HERNIA REPAIR  23/4427    paraumbilical, 2 hernias    HX OTHER SURGICAL  34\72\2779    nl stress myoview    HX OTHER SURGICAL      Aortic abdominal bypass    HX OTHER SURGICAL  03/2019    Cystoscopy    HX SMALL BOWEL RESECTION  08/2018    13 inches    HX TOTAL COLECTOMY  08/2018    total       Allergies   Allergen Reactions    Amitriptyline Rash    Simvastatin Myalgia     Pt takes this, says he is not allergic       Current Outpatient Medications   Medication Sig    traMADoL (ULTRAM) 50 mg tablet Take 1 Tablet by mouth every six (6) hours as needed for Pain for up to 3 days. Max Daily Amount: 200 mg.    metoclopramide HCl (REGLAN) 5 mg tablet TAKE 1 TABLET THREE TIMES A DAY WITH MEALS IF NEEDED    carvediloL (COREG) 6.25 mg tablet Take 1 Tablet by mouth two (2) times daily (with meals). sertraline (ZOLOFT) 100 mg tablet TAKE 1 TABLET EVERY DAY    cyclobenzaprine (FLEXERIL) 10 mg tablet Take 1 Tablet by mouth nightly. As needed    Xarelto 20 mg tab tablet TAKE 1 TABLET EVERY DAY  WITH  BREAKFAST    tadalafiL (CIALIS) 5 mg tablet Take 5 mg by mouth daily. cholecalciferol (VITAMIN D3) (1000 Units /25 mcg) tablet Take  by mouth daily. cyanocobalamin 1,000 mcg tablet Take 1,000 mcg by mouth daily. Taking 500mcg     No current facility-administered medications for this visit.        Social History     Socioeconomic History    Marital status:    Tobacco Use    Smoking status: Some Days     Years: 46.00     Types: Cigarettes    Smokeless tobacco: Former     Quit date: 11/20/2016    Tobacco comments:     smokes a few   Vaping Use    Vaping Use: Never used   Substance and Sexual Activity    Alcohol use: No     Comment: rare    Drug use: No    Sexual activity: Yes     Partners: Female   Other Topics Concern    Blood Transfusions No    Sleep Concern Yes     Comment: Works swing shift    Exercise Yes    Seat Belt Yes    Self-Exams Yes Family History   Problem Relation Age of Onset    Cancer Sister         cervical    Hypertension Brother          Physical Exam:  Visit Vitals  /82   Pulse 72   Temp 97.9 °F (36.6 °C)   Resp 12   Ht 5' 8\" (1.727 m)   Wt 164 lb (74.4 kg)   SpO2 97%   BMI 24.94 kg/m²     Physical Exam  Vitals and nursing note reviewed. Constitutional:       Appearance: He is normal weight. HENT:      Head: Normocephalic and atraumatic. Eyes:      Extraocular Movements: Extraocular movements intact. Conjunctiva/sclera: Conjunctivae normal.   Cardiovascular:      Rate and Rhythm: Normal rate and regular rhythm. Heart sounds: Normal heart sounds. Pulmonary:      Effort: Pulmonary effort is normal.      Breath sounds: Normal breath sounds. Musculoskeletal:      Right lower leg: No edema. Left lower leg: No edema. Skin:     General: Skin is warm and dry. Neurological:      Mental Status: He is alert and oriented to person, place, and time. Assessment/Plan:    ICD-10-CM ICD-9-CM    1. Essential hypertension  I10 401.9 CBC WITH AUTOMATED DIFF      METABOLIC PANEL, BASIC      METABOLIC PANEL, BASIC      CBC WITH AUTOMATED DIFF      IN COLLECTION VENOUS BLOOD VENIPUNCTURE      2. Elevated glucose  R73.09 790.29 HEMOGLOBIN A1C WITH EAG      HEMOGLOBIN A1C WITH EAG      3. Coronary artery disease of native heart with stable angina pectoris, unspecified vessel or lesion type ([facility]  I25.118 414.01      413.9       4. Chronic obstructive pulmonary disease, unspecified COPD type ([facility]  J44.9 496       5. Stage 3 chronic kidney disease, unspecified whether stage 3a or 3b CKD ([facility])  N18.30 585.3       6. Mononeuropathy  G58.9 355.9 REFERRAL TO PAIN MANAGEMENT      7. Moderate left ankle sprain, initial encounter  S93.402A 845.00 traMADoL (ULTRAM) 50 mg tablet          Follow-up and Dispositions    Return in about 6 months (around 7/3/2023).          Morteza Torres MD

## 2023-01-04 LAB
ANION GAP SERPL CALC-SCNC: 5 MMOL/L (ref 5–15)
BASOPHILS # BLD: 0.1 K/UL (ref 0–0.1)
BASOPHILS NFR BLD: 1 % (ref 0–1)
BUN SERPL-MCNC: 23 MG/DL (ref 6–20)
BUN/CREAT SERPL: 18 (ref 12–20)
CALCIUM SERPL-MCNC: 9.1 MG/DL (ref 8.5–10.1)
CHLORIDE SERPL-SCNC: 104 MMOL/L (ref 97–108)
CO2 SERPL-SCNC: 31 MMOL/L (ref 21–32)
CREAT SERPL-MCNC: 1.28 MG/DL (ref 0.7–1.3)
DIFFERENTIAL METHOD BLD: ABNORMAL
EOSINOPHIL # BLD: 0.3 K/UL (ref 0–0.4)
EOSINOPHIL NFR BLD: 4 % (ref 0–7)
ERYTHROCYTE [DISTWIDTH] IN BLOOD BY AUTOMATED COUNT: 13.3 % (ref 11.5–14.5)
EST. AVERAGE GLUCOSE BLD GHB EST-MCNC: 114 MG/DL
GLUCOSE SERPL-MCNC: 105 MG/DL (ref 65–100)
HBA1C MFR BLD: 5.6 % (ref 4–5.6)
HCT VFR BLD AUTO: 52.1 % (ref 36.6–50.3)
HGB BLD-MCNC: 16.4 G/DL (ref 12.1–17)
IMM GRANULOCYTES # BLD AUTO: 0 K/UL (ref 0–0.04)
IMM GRANULOCYTES NFR BLD AUTO: 1 % (ref 0–0.5)
LYMPHOCYTES # BLD: 2.1 K/UL (ref 0.8–3.5)
LYMPHOCYTES NFR BLD: 24 % (ref 12–49)
MCH RBC QN AUTO: 30 PG (ref 26–34)
MCHC RBC AUTO-ENTMCNC: 31.5 G/DL (ref 30–36.5)
MCV RBC AUTO: 95.2 FL (ref 80–99)
MONOCYTES # BLD: 1 K/UL (ref 0–1)
MONOCYTES NFR BLD: 12 % (ref 5–13)
NEUTS SEG # BLD: 5.2 K/UL (ref 1.8–8)
NEUTS SEG NFR BLD: 58 % (ref 32–75)
NRBC # BLD: 0 K/UL (ref 0–0.01)
NRBC BLD-RTO: 0 PER 100 WBC
PLATELET # BLD AUTO: 159 K/UL (ref 150–400)
PMV BLD AUTO: 11.9 FL (ref 8.9–12.9)
POTASSIUM SERPL-SCNC: 3.5 MMOL/L (ref 3.5–5.1)
RBC # BLD AUTO: 5.47 M/UL (ref 4.1–5.7)
SODIUM SERPL-SCNC: 140 MMOL/L (ref 136–145)
WBC # BLD AUTO: 8.8 K/UL (ref 4.1–11.1)

## 2023-01-04 NOTE — PROGRESS NOTES
Call pt, the HbA1C is 5.6, in the high normal range now, very good, cont low conc sweets diet  The electrolytes and kidney tests are good  The CBC is good

## 2023-01-06 NOTE — PROGRESS NOTES
Patient returns my call. I have called and talked to this patient. I have verified the patient's name and . I have discussed the lab results and recommendations from Dr Layla Cole. Patient verbalizes understanding at this time.

## 2023-02-27 DIAGNOSIS — R11.0 NAUSEA: ICD-10-CM

## 2023-02-27 NOTE — TELEPHONE ENCOUNTER
Pt would like a 10 day supply until his mail order rx comes in.      He is almost out of medication   Requested Prescriptions     Pending Prescriptions Disp Refills    metoclopramide HCl (REGLAN) 5 mg tablet 90 Tablet 0     Sig: TAKE 1 TABLET THREE TIMES A DAY WITH MEALS IF NEEDED

## 2023-02-28 DIAGNOSIS — R11.0 NAUSEA: ICD-10-CM

## 2023-02-28 RX ORDER — METOCLOPRAMIDE 5 MG/1
TABLET ORAL
Qty: 90 TABLET | Refills: 0 | Status: SHIPPED | OUTPATIENT
Start: 2023-02-28

## 2023-02-28 RX ORDER — METOCLOPRAMIDE 5 MG/1
TABLET ORAL
Qty: 90 TABLET | Refills: 0 | OUTPATIENT
Start: 2023-02-28

## 2023-03-21 DIAGNOSIS — F32.89 OTHER DEPRESSION: ICD-10-CM

## 2023-03-21 RX ORDER — SERTRALINE HYDROCHLORIDE 100 MG/1
TABLET, FILM COATED ORAL
Qty: 90 TABLET | Refills: 0 | Status: SHIPPED | OUTPATIENT
Start: 2023-03-21

## 2023-05-26 RX ORDER — TADALAFIL 5 MG/1
5 TABLET ORAL DAILY
COMMUNITY
Start: 2022-01-25

## 2023-05-26 RX ORDER — CARVEDILOL 6.25 MG/1
1 TABLET ORAL 2 TIMES DAILY WITH MEALS
COMMUNITY
Start: 2023-01-23 | End: 2023-06-28 | Stop reason: SDUPTHER

## 2023-05-26 RX ORDER — SERTRALINE HYDROCHLORIDE 100 MG/1
1 TABLET, FILM COATED ORAL DAILY
COMMUNITY
Start: 2023-03-21

## 2023-05-26 RX ORDER — METOCLOPRAMIDE 5 MG/1
TABLET ORAL
COMMUNITY
Start: 2023-02-28 | End: 2023-06-11

## 2023-05-26 RX ORDER — CYCLOBENZAPRINE HCL 10 MG
1 TABLET ORAL NIGHTLY PRN
COMMUNITY
Start: 2022-08-18 | End: 2023-06-12 | Stop reason: SDUPTHER

## 2023-06-09 NOTE — TELEPHONE ENCOUNTER
Requested Prescriptions     Pending Prescriptions Disp Refills    metoclopramide (REGLAN) 5 MG tablet [Pharmacy Med Name: METOCLOPRAMIDE HCL 5 MG Tablet] 90 tablet 0     Sig: TAKE 1 TABLET THREE TIMES DAILY WITH MEALS AS NEEDED    XARELTO 20 MG TABS tablet [Pharmacy Med Name: Murrel Sheerer 20 MG Tablet] 90 tablet 0     Sig: TAKE 1 TABLET EVERY DAY  WITH  BREAKFAST     Last seen:  1/3/23

## 2023-06-11 RX ORDER — METOCLOPRAMIDE 5 MG/1
TABLET ORAL
Qty: 90 TABLET | Refills: 0 | Status: SHIPPED | OUTPATIENT
Start: 2023-06-11

## 2023-06-11 RX ORDER — RIVAROXABAN 20 MG/1
TABLET, FILM COATED ORAL
Qty: 90 TABLET | Refills: 0 | Status: SHIPPED | OUTPATIENT
Start: 2023-06-11

## 2023-06-12 ENCOUNTER — TELEPHONE (OUTPATIENT)
Dept: FAMILY MEDICINE CLINIC | Age: 68
End: 2023-06-12

## 2023-06-28 RX ORDER — CARVEDILOL 6.25 MG/1
6.25 TABLET ORAL 2 TIMES DAILY WITH MEALS
Qty: 180 TABLET | Refills: 0 | Status: SHIPPED | OUTPATIENT
Start: 2023-06-28 | End: 2023-08-24 | Stop reason: DRUGHIGH

## 2023-06-28 NOTE — TELEPHONE ENCOUNTER
Requested Prescriptions     Pending Prescriptions Disp Refills    carvedilol (COREG) 6.25 MG tablet 60 tablet 0     Sig: Take 1 tablet by mouth 2 times daily (with meals)     Last seen:  6/12/23    Last filled:   Disp Refills Start End    carvediloL (COREG) 6.25 mg tablet 180 Tablet 1 1/23/2023

## 2023-07-07 DIAGNOSIS — M79.605 PAIN IN LEFT LEG: ICD-10-CM

## 2023-07-07 RX ORDER — CYCLOBENZAPRINE HCL 10 MG
TABLET ORAL
Qty: 30 TABLET | Refills: 0 | Status: SHIPPED | OUTPATIENT
Start: 2023-07-07

## 2023-07-07 NOTE — TELEPHONE ENCOUNTER
Patient requesting refill on     Requested Prescriptions     Pending Prescriptions Disp Refills    cyclobenzaprine (FLEXERIL) 10 MG tablet [Pharmacy Med Name: CYCLOBENZAPRINE HYDROCHLORIDE 10 MG Tablet] 30 tablet 0     Sig: TAKE 1 TABLET EVERY NIGHT AS NEEDED FOR MUSCLE SPASM(S)         Last OV 1/3/2023 with Dr. Iván Blevins.

## 2023-08-21 ENCOUNTER — TELEPHONE (OUTPATIENT)
Dept: FAMILY MEDICINE CLINIC | Age: 68
End: 2023-08-21

## 2023-08-21 NOTE — TELEPHONE ENCOUNTER
Patient states he has blood in his urine and believes it my be caused by the [facility]. Can we change his medication.   He can be reached at [phone]

## 2023-08-21 NOTE — TELEPHONE ENCOUNTER
I have called and spoke to this patient. H tells me that he has seen a urologist (Dr Alecia Frances) in [facility] about the Hematuria. This is who told him to ask his PCP about changing the blood thinner because he feels like it is causing the blood in the urine. H also tells me that he has been here and had the same issue with blood in his urine. He does not feel like he should rachel an apt. I explained to him that the appointment may be required to discuss changing his medication.   Patient verbalizes understanding, but does ask that we ask Dr Magdalena Mcburney first.

## 2023-08-22 NOTE — TELEPHONE ENCOUNTER
I have called and spoke to this patient. Per Dr Radha Wright:  Pt needs to come in   BW Small   Patient verbalizes understanding. No PSR available to transfer call to schedule apt. Please call patient to schedule.

## 2023-08-24 ENCOUNTER — OFFICE VISIT (OUTPATIENT)
Dept: FAMILY MEDICINE CLINIC | Age: 68
End: 2023-08-24
Payer: MEDICARE

## 2023-08-24 VITALS
TEMPERATURE: 97 F | RESPIRATION RATE: 12 BRPM | BODY MASS INDEX: 24.25 KG/M2 | SYSTOLIC BLOOD PRESSURE: 154 MMHG | OXYGEN SATURATION: 98 % | HEART RATE: 77 BPM | DIASTOLIC BLOOD PRESSURE: 100 MMHG | HEIGHT: 68 IN | WEIGHT: 160 LBS

## 2023-08-24 DIAGNOSIS — Z00.00 MEDICARE ANNUAL WELLNESS VISIT, SUBSEQUENT: ICD-10-CM

## 2023-08-24 DIAGNOSIS — I25.118 CORONARY ARTERY DISEASE OF NATIVE HEART WITH STABLE ANGINA PECTORIS, UNSPECIFIED VESSEL OR LESION TYPE (HCC): ICD-10-CM

## 2023-08-24 DIAGNOSIS — R31.0 GROSS HEMATURIA: Primary | ICD-10-CM

## 2023-08-24 DIAGNOSIS — Z86.711 HISTORY OF PULMONARY EMBOLUS (PE): ICD-10-CM

## 2023-08-24 DIAGNOSIS — I10 HYPERTENSION, UNSPECIFIED TYPE: ICD-10-CM

## 2023-08-24 DIAGNOSIS — Z13.6 SCREENING FOR AAA (ABDOMINAL AORTIC ANEURYSM): ICD-10-CM

## 2023-08-24 DIAGNOSIS — M79.605 PAIN IN LEFT LEG: ICD-10-CM

## 2023-08-24 LAB
BILIRUBIN, URINE, POC: NEGATIVE
BLOOD URINE, POC: NORMAL
GLUCOSE URINE, POC: NEGATIVE
KETONES, URINE, POC: NEGATIVE
LEUKOCYTE ESTERASE, URINE, POC: NORMAL
NITRITE, URINE, POC: NEGATIVE
PH, URINE, POC: 5.5 (ref 4.6–8)
PROTEIN,URINE, POC: NORMAL
SPECIFIC GRAVITY, URINE, POC: 1.01 (ref 1–1.03)
URINALYSIS CLARITY, POC: NORMAL
URINALYSIS COLOR, POC: NORMAL
UROBILINOGEN, POC: NORMAL

## 2023-08-24 PROCEDURE — G8428 CUR MEDS NOT DOCUMENT: HCPCS | Performed by: FAMILY MEDICINE

## 2023-08-24 PROCEDURE — 3080F DIAST BP >= 90 MM HG: CPT | Performed by: FAMILY MEDICINE

## 2023-08-24 PROCEDURE — 4004F PT TOBACCO SCREEN RCVD TLK: CPT | Performed by: FAMILY MEDICINE

## 2023-08-24 PROCEDURE — 81001 URINALYSIS AUTO W/SCOPE: CPT | Performed by: FAMILY MEDICINE

## 2023-08-24 PROCEDURE — 1123F ACP DISCUSS/DSCN MKR DOCD: CPT | Performed by: FAMILY MEDICINE

## 2023-08-24 PROCEDURE — 3077F SYST BP >= 140 MM HG: CPT | Performed by: FAMILY MEDICINE

## 2023-08-24 PROCEDURE — 99213 OFFICE O/P EST LOW 20 MIN: CPT | Performed by: FAMILY MEDICINE

## 2023-08-24 PROCEDURE — G8420 CALC BMI NORM PARAMETERS: HCPCS | Performed by: FAMILY MEDICINE

## 2023-08-24 PROCEDURE — 3017F COLORECTAL CA SCREEN DOC REV: CPT | Performed by: FAMILY MEDICINE

## 2023-08-24 PROCEDURE — G0439 PPPS, SUBSEQ VISIT: HCPCS | Performed by: FAMILY MEDICINE

## 2023-08-24 RX ORDER — HYDROCHLOROTHIAZIDE 12.5 MG/1
12.5 CAPSULE, GELATIN COATED ORAL EVERY MORNING
Qty: 30 CAPSULE | Refills: 0 | Status: SHIPPED | OUTPATIENT
Start: 2023-08-24

## 2023-08-24 RX ORDER — TAMSULOSIN HYDROCHLORIDE 0.4 MG/1
0.4 CAPSULE ORAL DAILY
COMMUNITY

## 2023-08-24 RX ORDER — CARVEDILOL 12.5 MG/1
12.5 TABLET ORAL 2 TIMES DAILY
Qty: 180 TABLET | Refills: 1 | Status: SHIPPED | OUTPATIENT
Start: 2023-08-24

## 2023-08-24 RX ORDER — CYCLOBENZAPRINE HCL 10 MG
TABLET ORAL
Qty: 90 TABLET | Refills: 1 | Status: SHIPPED | OUTPATIENT
Start: 2023-08-24

## 2023-08-24 ASSESSMENT — PATIENT HEALTH QUESTIONNAIRE - PHQ9
2. FEELING DOWN, DEPRESSED OR HOPELESS: 3
8. MOVING OR SPEAKING SO SLOWLY THAT OTHER PEOPLE COULD HAVE NOTICED. OR THE OPPOSITE, BEING SO FIGETY OR RESTLESS THAT YOU HAVE BEEN MOVING AROUND A LOT MORE THAN USUAL: 0
4. FEELING TIRED OR HAVING LITTLE ENERGY: 3
SUM OF ALL RESPONSES TO PHQ QUESTIONS 1-9: 15
SUM OF ALL RESPONSES TO PHQ QUESTIONS 1-9: 15
7. TROUBLE CONCENTRATING ON THINGS, SUCH AS READING THE NEWSPAPER OR WATCHING TELEVISION: 0
6. FEELING BAD ABOUT YOURSELF - OR THAT YOU ARE A FAILURE OR HAVE LET YOURSELF OR YOUR FAMILY DOWN: 3
9. THOUGHTS THAT YOU WOULD BE BETTER OFF DEAD, OR OF HURTING YOURSELF: 0
SUM OF ALL RESPONSES TO PHQ QUESTIONS 1-9: 15
10. IF YOU CHECKED OFF ANY PROBLEMS, HOW DIFFICULT HAVE THESE PROBLEMS MADE IT FOR YOU TO DO YOUR WORK, TAKE CARE OF THINGS AT HOME, OR GET ALONG WITH OTHER PEOPLE: 0
3. TROUBLE FALLING OR STAYING ASLEEP: 3
SUM OF ALL RESPONSES TO PHQ9 QUESTIONS 1 & 2: 6
SUM OF ALL RESPONSES TO PHQ QUESTIONS 1-9: 15
5. POOR APPETITE OR OVEREATING: 0
1. LITTLE INTEREST OR PLEASURE IN DOING THINGS: 3

## 2023-08-24 ASSESSMENT — LIFESTYLE VARIABLES
HOW MANY STANDARD DRINKS CONTAINING ALCOHOL DO YOU HAVE ON A TYPICAL DAY: 1 OR 2
HOW OFTEN DO YOU HAVE A DRINK CONTAINING ALCOHOL: MONTHLY OR LESS

## 2023-08-24 NOTE — TELEPHONE ENCOUNTER
Requested Prescriptions     Pending Prescriptions Disp Refills    cyclobenzaprine (FLEXERIL) 10 MG tablet [Pharmacy Med Name: CYCLOBENZAPRINE HYDROCHLORIDE 10 MG Tablet] 30 tablet 0     Sig: TAKE 1 TABLET EVERY NIGHT AS NEEDED FOR MUSCLE SPASM(S)     Last seen:  6/12/23    Last filled:    cyclobenzaprine (FLEXERIL) 10 MG tablet [[id number]]     Order Details  Dose, Route, Frequency: As Directed   Dispense Quantity: 30 tablet Refills: 0          Sig: TAKE 1 TABLET EVERY NIGHT AS NEEDED FOR MUSCLE SPASM(S)         Start Date: 07/07/23

## 2023-08-25 LAB
BACTERIA SPEC CULT: NORMAL
ERYTHROCYTE [DISTWIDTH] IN BLOOD BY AUTOMATED COUNT: 13.2 % (ref 11.5–14.5)
HCT VFR BLD AUTO: 55.9 % (ref 36.6–50.3)
HGB BLD-MCNC: 17.9 G/DL (ref 12.1–17)
MCH RBC QN AUTO: 29.9 PG (ref 26–34)
MCHC RBC AUTO-ENTMCNC: 32 G/DL (ref 30–36.5)
MCV RBC AUTO: 93.3 FL (ref 80–99)
NRBC # BLD: 0 K/UL (ref 0–0.01)
NRBC BLD-RTO: 0 PER 100 WBC
PLATELET # BLD AUTO: 240 K/UL (ref 150–400)
PMV BLD AUTO: 11.3 FL (ref 8.9–12.9)
RBC # BLD AUTO: 5.99 M/UL (ref 4.1–5.7)
SERVICE CMNT-IMP: NORMAL
WBC # BLD AUTO: 9.8 K/UL (ref 4.1–11.1)

## 2023-08-28 ENCOUNTER — TELEPHONE (OUTPATIENT)
Dept: FAMILY MEDICINE CLINIC | Age: 68
End: 2023-08-28

## 2023-08-28 NOTE — TELEPHONE ENCOUNTER
Catrachito Leonard from [facility] needs a new primary code for patient's AAA screening. Please call her back at [phone] Option #2 so she can proceed with scheduling.

## 2023-08-28 NOTE — TELEPHONE ENCOUNTER
Pt calls to state that he wants a referral to see a cardiologist Dr. Hamida Harrison in [facility]

## 2023-08-29 DIAGNOSIS — I25.2 HISTORY OF MI (MYOCARDIAL INFARCTION): Primary | ICD-10-CM

## 2023-09-12 DIAGNOSIS — F32.89 OTHER SPECIFIED DEPRESSIVE EPISODES: ICD-10-CM

## 2023-09-12 RX ORDER — SERTRALINE HYDROCHLORIDE 100 MG/1
TABLET, FILM COATED ORAL DAILY
Qty: 90 TABLET | Refills: 0 | Status: SHIPPED | OUTPATIENT
Start: 2023-09-12

## 2023-09-13 ENCOUNTER — TELEPHONE (OUTPATIENT)
Dept: FAMILY MEDICINE CLINIC | Age: 68
End: 2023-09-13

## 2023-09-19 ENCOUNTER — OFFICE VISIT (OUTPATIENT)
Dept: FAMILY MEDICINE CLINIC | Age: 68
End: 2023-09-19
Payer: MEDICARE

## 2023-09-19 VITALS
DIASTOLIC BLOOD PRESSURE: 86 MMHG | HEART RATE: 73 BPM | BODY MASS INDEX: 24.25 KG/M2 | WEIGHT: 160 LBS | OXYGEN SATURATION: 97 % | HEIGHT: 68 IN | TEMPERATURE: 97.9 F | RESPIRATION RATE: 12 BRPM | SYSTOLIC BLOOD PRESSURE: 138 MMHG

## 2023-09-19 DIAGNOSIS — Z71.6 TOBACCO ABUSE COUNSELING: ICD-10-CM

## 2023-09-19 DIAGNOSIS — I10 HYPERTENSION, UNSPECIFIED TYPE: Primary | ICD-10-CM

## 2023-09-19 PROCEDURE — 1123F ACP DISCUSS/DSCN MKR DOCD: CPT | Performed by: FAMILY MEDICINE

## 2023-09-19 PROCEDURE — 99213 OFFICE O/P EST LOW 20 MIN: CPT | Performed by: FAMILY MEDICINE

## 2023-09-19 PROCEDURE — 3075F SYST BP GE 130 - 139MM HG: CPT | Performed by: FAMILY MEDICINE

## 2023-09-19 PROCEDURE — 3079F DIAST BP 80-89 MM HG: CPT | Performed by: FAMILY MEDICINE

## 2023-09-19 PROCEDURE — G8420 CALC BMI NORM PARAMETERS: HCPCS | Performed by: FAMILY MEDICINE

## 2023-09-19 PROCEDURE — G8428 CUR MEDS NOT DOCUMENT: HCPCS | Performed by: FAMILY MEDICINE

## 2023-09-19 PROCEDURE — 4004F PT TOBACCO SCREEN RCVD TLK: CPT | Performed by: FAMILY MEDICINE

## 2023-09-19 PROCEDURE — 3017F COLORECTAL CA SCREEN DOC REV: CPT | Performed by: FAMILY MEDICINE

## 2023-09-19 RX ORDER — HYDROCHLOROTHIAZIDE 12.5 MG/1
12.5 CAPSULE, GELATIN COATED ORAL EVERY MORNING
Qty: 90 CAPSULE | Refills: 1 | Status: SHIPPED | OUTPATIENT
Start: 2023-09-19

## 2023-09-19 ASSESSMENT — PATIENT HEALTH QUESTIONNAIRE - PHQ9
SUM OF ALL RESPONSES TO PHQ QUESTIONS 1-9: 2
2. FEELING DOWN, DEPRESSED OR HOPELESS: 1
SUM OF ALL RESPONSES TO PHQ QUESTIONS 1-9: 2
1. LITTLE INTEREST OR PLEASURE IN DOING THINGS: 1
SUM OF ALL RESPONSES TO PHQ QUESTIONS 1-9: 2
SUM OF ALL RESPONSES TO PHQ9 QUESTIONS 1 & 2: 2
SUM OF ALL RESPONSES TO PHQ9 QUESTIONS 1 & 2: 2
SUM OF ALL RESPONSES TO PHQ QUESTIONS 1-9: 2
2. FEELING DOWN, DEPRESSED OR HOPELESS: 1
SUM OF ALL RESPONSES TO PHQ QUESTIONS 1-9: 2
1. LITTLE INTEREST OR PLEASURE IN DOING THINGS: 1
SUM OF ALL RESPONSES TO PHQ QUESTIONS 1-9: 2

## 2023-10-02 RX ORDER — METOCLOPRAMIDE 5 MG/1
TABLET ORAL
Qty: 90 TABLET | Refills: 0 | Status: SHIPPED | OUTPATIENT
Start: 2023-10-02

## 2023-10-09 ENCOUNTER — TELEPHONE (OUTPATIENT)
Dept: FAMILY MEDICINE CLINIC | Age: 68
End: 2023-10-09

## 2023-10-09 NOTE — TELEPHONE ENCOUNTER
Amanda Melendez from [facility] asking for call back. States patient has history of cardiovascular disease and wants to know if patient is taking a statin.  She can be reached at [phone]

## 2023-11-19 RX ORDER — METOCLOPRAMIDE 5 MG/1
TABLET ORAL
Qty: 90 TABLET | Refills: 1 | Status: SHIPPED | OUTPATIENT
Start: 2023-11-19

## 2023-11-27 DIAGNOSIS — F32.89 OTHER SPECIFIED DEPRESSIVE EPISODES: ICD-10-CM

## 2023-11-28 RX ORDER — SERTRALINE HYDROCHLORIDE 100 MG/1
100 TABLET, FILM COATED ORAL DAILY
Qty: 90 TABLET | Refills: 0 | Status: SHIPPED | OUTPATIENT
Start: 2023-11-28

## 2024-01-24 DIAGNOSIS — I10 HYPERTENSION, UNSPECIFIED TYPE: ICD-10-CM

## 2024-01-24 RX ORDER — CARVEDILOL 12.5 MG/1
12.5 TABLET ORAL 2 TIMES DAILY
Qty: 180 TABLET | Refills: 0 | Status: SHIPPED | OUTPATIENT
Start: 2024-01-24

## 2024-02-12 DIAGNOSIS — F32.89 OTHER SPECIFIED DEPRESSIVE EPISODES: ICD-10-CM

## 2024-02-12 DIAGNOSIS — I10 HYPERTENSION, UNSPECIFIED TYPE: ICD-10-CM

## 2024-02-12 RX ORDER — HYDROCHLOROTHIAZIDE 12.5 MG/1
12.5 CAPSULE, GELATIN COATED ORAL EVERY MORNING
Qty: 30 CAPSULE | Refills: 0 | Status: SHIPPED | OUTPATIENT
Start: 2024-02-12

## 2024-02-12 RX ORDER — SERTRALINE HYDROCHLORIDE 100 MG/1
100 TABLET, FILM COATED ORAL DAILY
Qty: 30 TABLET | Refills: 0 | Status: SHIPPED | OUTPATIENT
Start: 2024-02-12

## 2024-03-11 DIAGNOSIS — F32.89 OTHER SPECIFIED DEPRESSIVE EPISODES: ICD-10-CM

## 2024-03-11 DIAGNOSIS — I10 HYPERTENSION, UNSPECIFIED TYPE: ICD-10-CM

## 2024-03-11 RX ORDER — HYDROCHLOROTHIAZIDE 12.5 MG/1
12.5 CAPSULE, GELATIN COATED ORAL EVERY MORNING
Qty: 90 CAPSULE | Refills: 0 | Status: SHIPPED | OUTPATIENT
Start: 2024-03-11

## 2024-03-11 RX ORDER — SERTRALINE HYDROCHLORIDE 100 MG/1
100 TABLET, FILM COATED ORAL DAILY
Qty: 90 TABLET | Refills: 0 | Status: SHIPPED | OUTPATIENT
Start: 2024-03-11

## 2024-03-11 RX ORDER — METOCLOPRAMIDE 5 MG/1
TABLET ORAL
Qty: 90 TABLET | Refills: 0 | Status: SHIPPED | OUTPATIENT
Start: 2024-03-11

## 2024-03-11 NOTE — TELEPHONE ENCOUNTER
Requested Prescriptions     Pending Prescriptions Disp Refills    hydroCHLOROthiazide 12.5 MG capsule [Pharmacy Med Name: HYDROCHLOROTHIAZIDE 12.5 MG Capsule] 30 capsule 11     Sig: TAKE 1 CAPSULE EVERY MORNING    metoclopramide (REGLAN) 5 MG tablet [Pharmacy Med Name: METOCLOPRAMIDE HCL 5 MG Tablet] 90 tablet 11     Sig: TAKE 1 TABLET THREE TIMES DAILY WITH MEALS AS NEEDED    sertraline (ZOLOFT) 100 MG tablet [Pharmacy Med Name: SERTRALINE HYDROCHLORIDE 100 MG Tablet] 30 tablet 11     Sig: TAKE 1 TABLET EVERY DAY     Last seen:  9/19/23

## 2024-03-30 DIAGNOSIS — M79.605 PAIN IN LEFT LEG: ICD-10-CM

## 2024-04-01 RX ORDER — CYCLOBENZAPRINE HCL 10 MG
TABLET ORAL
Qty: 90 TABLET | Refills: 0 | Status: SHIPPED | OUTPATIENT
Start: 2024-04-01

## 2024-04-03 RX ORDER — METOCLOPRAMIDE 5 MG/1
TABLET ORAL
Qty: 90 TABLET | Refills: 11 | OUTPATIENT
Start: 2024-04-03

## 2024-04-03 NOTE — TELEPHONE ENCOUNTER
Requested Prescriptions     Pending Prescriptions Disp Refills    metoclopramide (REGLAN) 5 MG tablet [Pharmacy Med Name: METOCLOPRAMIDE HCL 5 MG Tablet] 90 tablet 11     Sig: TAKE 1 TABLET THREE TIMES DAILY WITH MEALS AS NEEDED     LOV 9/19/23  Last labs 8/8/23 (Outside)  Last refill 3/11/24 #90 with 0 RF

## 2024-04-08 DIAGNOSIS — I10 HYPERTENSION, UNSPECIFIED TYPE: ICD-10-CM

## 2024-04-08 NOTE — TELEPHONE ENCOUNTER
Requested Prescriptions     Pending Prescriptions Disp Refills    carvedilol (COREG) 12.5 MG tablet [Pharmacy Med Name: CARVEDILOL 12.5 MG Tablet] 180 tablet 3     Sig: TAKE 1 TABLET TWICE DAILY     LOV 9/19/23  Last labs 8/8/23 - Outside   Last refill 1/24/24 #180 with 0 RF

## 2024-04-10 RX ORDER — CARVEDILOL 12.5 MG/1
12.5 TABLET ORAL 2 TIMES DAILY
Qty: 180 TABLET | Refills: 3 | OUTPATIENT
Start: 2024-04-10

## 2024-04-18 ENCOUNTER — OFFICE VISIT (OUTPATIENT)
Dept: FAMILY MEDICINE CLINIC | Age: 69
End: 2024-04-18
Payer: MEDICARE

## 2024-04-18 VITALS
TEMPERATURE: 97.9 F | DIASTOLIC BLOOD PRESSURE: 86 MMHG | HEART RATE: 84 BPM | HEIGHT: 69 IN | OXYGEN SATURATION: 93 % | SYSTOLIC BLOOD PRESSURE: 142 MMHG | BODY MASS INDEX: 24.44 KG/M2 | WEIGHT: 165 LBS | RESPIRATION RATE: 12 BRPM

## 2024-04-18 DIAGNOSIS — I10 HYPERTENSION, UNSPECIFIED TYPE: Primary | ICD-10-CM

## 2024-04-18 DIAGNOSIS — R73.09 ELEVATED GLUCOSE: ICD-10-CM

## 2024-04-18 DIAGNOSIS — F32.A DEPRESSION, UNSPECIFIED DEPRESSION TYPE: ICD-10-CM

## 2024-04-18 DIAGNOSIS — I25.118 CORONARY ARTERY DISEASE OF NATIVE HEART WITH STABLE ANGINA PECTORIS, UNSPECIFIED VESSEL OR LESION TYPE (HCC): ICD-10-CM

## 2024-04-18 DIAGNOSIS — N18.31 STAGE 3A CHRONIC KIDNEY DISEASE (HCC): ICD-10-CM

## 2024-04-18 PROCEDURE — 3079F DIAST BP 80-89 MM HG: CPT | Performed by: FAMILY MEDICINE

## 2024-04-18 PROCEDURE — G8420 CALC BMI NORM PARAMETERS: HCPCS | Performed by: FAMILY MEDICINE

## 2024-04-18 PROCEDURE — 36415 COLL VENOUS BLD VENIPUNCTURE: CPT | Performed by: FAMILY MEDICINE

## 2024-04-18 PROCEDURE — 99214 OFFICE O/P EST MOD 30 MIN: CPT | Performed by: FAMILY MEDICINE

## 2024-04-18 PROCEDURE — 3077F SYST BP >= 140 MM HG: CPT | Performed by: FAMILY MEDICINE

## 2024-04-18 PROCEDURE — G8427 DOCREV CUR MEDS BY ELIG CLIN: HCPCS | Performed by: FAMILY MEDICINE

## 2024-04-18 PROCEDURE — 1123F ACP DISCUSS/DSCN MKR DOCD: CPT | Performed by: FAMILY MEDICINE

## 2024-04-18 PROCEDURE — 4004F PT TOBACCO SCREEN RCVD TLK: CPT | Performed by: FAMILY MEDICINE

## 2024-04-18 PROCEDURE — 3017F COLORECTAL CA SCREEN DOC REV: CPT | Performed by: FAMILY MEDICINE

## 2024-04-18 RX ORDER — SERTRALINE HYDROCHLORIDE 150 MG/1
CAPSULE ORAL
Qty: 90 CAPSULE | Refills: 0 | Status: SHIPPED | OUTPATIENT
Start: 2024-04-18

## 2024-04-18 RX ORDER — ROSUVASTATIN CALCIUM 5 MG/1
5 TABLET, COATED ORAL DAILY
COMMUNITY
Start: 2024-04-01

## 2024-04-18 RX ORDER — ASPIRIN 81 MG/1
81 TABLET ORAL DAILY
COMMUNITY

## 2024-04-18 RX ORDER — CARVEDILOL 12.5 MG/1
12.5 TABLET ORAL 2 TIMES DAILY
Qty: 180 TABLET | Refills: 1 | Status: SHIPPED | OUTPATIENT
Start: 2024-04-18

## 2024-04-18 ASSESSMENT — PATIENT HEALTH QUESTIONNAIRE - PHQ9
2. FEELING DOWN, DEPRESSED OR HOPELESS: NOT AT ALL
1. LITTLE INTEREST OR PLEASURE IN DOING THINGS: NOT AT ALL
SUM OF ALL RESPONSES TO PHQ QUESTIONS 1-9: 0
SUM OF ALL RESPONSES TO PHQ9 QUESTIONS 1 & 2: 0
SUM OF ALL RESPONSES TO PHQ QUESTIONS 1-9: 0

## 2024-04-18 NOTE — PROGRESS NOTES
Successful venipuncture in patient's right forearm X 1 sticks.  The patient tolerated this procedure w/o c/o pain or discomfort.  
\"Have you been to the ER, urgent care clinic since your last visit?  Hospitalized since your last visit?\"    NO    “Have you seen or consulted any other health care providers outside of [facility] since your last visit?”    NO            Click Here for Release of Records Request  
   paraumbilical, 2 hernias    OTHER SURGICAL HISTORY  03/2019    Cystoscopy    OTHER SURGICAL HISTORY      Aortic abdominal bypass    OTHER SURGICAL HISTORY  [id number]    nl stress myoview    SMALL INTESTINE SURGERY  08/2018    13 inches    TOTAL COLECTOMY  08/2018    total       Allergies   Allergen Reactions    Simvastatin Myalgia     Pt takes this, says he is not allergic    Amitriptyline Rash       Current Outpatient Medications   Medication Sig Dispense Refill    aspirin 81 MG EC tablet Take 1 tablet by mouth daily      carvedilol (COREG) 12.5 MG tablet Take 1 tablet by mouth 2 times daily 180 tablet 1    Sertraline HCl 150 MG CAPS Take one a day 90 capsule 0    rosuvastatin (CRESTOR) 5 MG tablet Take 1 tablet by mouth daily      cyclobenzaprine (FLEXERIL) 10 MG tablet TAKE 1 TABLET AT NIGHT AS NEEDED 90 tablet 0    hydroCHLOROthiazide 12.5 MG capsule TAKE 1 CAPSULE EVERY MORNING 90 capsule 0    metoclopramide (REGLAN) 5 MG tablet TAKE 1 TABLET THREE TIMES DAILY WITH MEALS AS NEEDED 90 tablet 0    tamsulosin (FLOMAX) 0.4 MG capsule Take 1 capsule by mouth daily      vitamin D (CHOLECALCIFEROL) 25 MCG (1000 UT) TABS tablet Take by mouth daily      cyanocobalamin 1000 MCG tablet Take 1 tablet by mouth daily      tadalafil (CIALIS) 5 MG tablet Take 1 tablet by mouth daily       No current facility-administered medications for this visit.       Social History     Socioeconomic History    Marital status:    Tobacco Use    Smoking status: Some Days    Smokeless tobacco: Former     Quit date: 11/20/2016   Substance and Sexual Activity    Alcohol use: No    Drug use: No     Social Determinants of Health     Financial Resource Strain: High Risk (6/12/2023)    Overall Financial Resource Strain (CARDIA)     Difficulty of Paying Living Expenses: Hard   Transportation Needs: Unknown (6/12/2023)    PRAPARE - Transportation     Lack of Transportation (Non-Medical): No   Physical Activity: Insufficiently Active

## 2024-04-20 LAB
ALBUMIN SERPL-MCNC: 3.7 G/DL (ref 3.5–5)
ALBUMIN/GLOB SERPL: 1.2 (ref 1.1–2.2)
ALP SERPL-CCNC: 93 U/L (ref 45–117)
ALT SERPL-CCNC: 25 U/L (ref 12–78)
ANION GAP SERPL CALC-SCNC: 8 MMOL/L (ref 5–15)
AST SERPL-CCNC: 18 U/L (ref 15–37)
BASOPHILS # BLD: 0.1 K/UL (ref 0–0.1)
BASOPHILS NFR BLD: 1 % (ref 0–1)
BILIRUB SERPL-MCNC: 0.3 MG/DL (ref 0.2–1)
BUN SERPL-MCNC: 21 MG/DL (ref 6–20)
BUN/CREAT SERPL: 16 (ref 12–20)
CALCIUM SERPL-MCNC: 9.4 MG/DL (ref 8.5–10.1)
CHLORIDE SERPL-SCNC: 107 MMOL/L (ref 97–108)
CO2 SERPL-SCNC: 25 MMOL/L (ref 21–32)
CREAT SERPL-MCNC: 1.35 MG/DL (ref 0.7–1.3)
DIFFERENTIAL METHOD BLD: ABNORMAL
EOSINOPHIL # BLD: 0.3 K/UL (ref 0–0.4)
EOSINOPHIL NFR BLD: 3 % (ref 0–7)
ERYTHROCYTE [DISTWIDTH] IN BLOOD BY AUTOMATED COUNT: 13.2 % (ref 11.5–14.5)
EST. AVERAGE GLUCOSE BLD GHB EST-MCNC: 123 MG/DL
GLOBULIN SER CALC-MCNC: 3.2 G/DL (ref 2–4)
GLUCOSE SERPL-MCNC: 138 MG/DL (ref 65–100)
HBA1C MFR BLD: 5.9 % (ref 4–5.6)
HCT VFR BLD AUTO: 52.6 % (ref 36.6–50.3)
HGB BLD-MCNC: 17.5 G/DL (ref 12.1–17)
IMM GRANULOCYTES # BLD AUTO: 0 K/UL (ref 0–0.04)
IMM GRANULOCYTES NFR BLD AUTO: 0 % (ref 0–0.5)
LYMPHOCYTES # BLD: 2.2 K/UL (ref 0.8–3.5)
LYMPHOCYTES NFR BLD: 24 % (ref 12–49)
MCH RBC QN AUTO: 30.9 PG (ref 26–34)
MCHC RBC AUTO-ENTMCNC: 33.3 G/DL (ref 30–36.5)
MCV RBC AUTO: 92.9 FL (ref 80–99)
MONOCYTES # BLD: 1 K/UL (ref 0–1)
MONOCYTES NFR BLD: 11 % (ref 5–13)
NEUTS SEG # BLD: 5.6 K/UL (ref 1.8–8)
NEUTS SEG NFR BLD: 61 % (ref 32–75)
NRBC # BLD: 0 K/UL (ref 0–0.01)
NRBC BLD-RTO: 0 PER 100 WBC
PLATELET # BLD AUTO: 162 K/UL (ref 150–400)
PMV BLD AUTO: 11.5 FL (ref 8.9–12.9)
POTASSIUM SERPL-SCNC: 3.4 MMOL/L (ref 3.5–5.1)
PROT SERPL-MCNC: 6.9 G/DL (ref 6.4–8.2)
RBC # BLD AUTO: 5.66 M/UL (ref 4.1–5.7)
SODIUM SERPL-SCNC: 140 MMOL/L (ref 136–145)
WBC # BLD AUTO: 9.1 K/UL (ref 4.1–11.1)

## 2024-04-22 ENCOUNTER — TELEPHONE (OUTPATIENT)
Dept: FAMILY MEDICINE CLINIC | Age: 69
End: 2024-04-22

## 2024-04-22 NOTE — TELEPHONE ENCOUNTER
As of now, I have not gotten any forms from the new DME company [employer].  Will send the information once we get the request.    I have called and spoke to the [facility].  They tell me that the insurance company wants to try:  Fluoxetine (Prozac)  Citalopram  Paroxetine  Prior to covering the Sertraline 150 mg.

## 2024-04-22 NOTE — TELEPHONE ENCOUNTER
Pt calls for 2 things. One, he has changed his DME supply company to [employer].     Please send DME info to this company. They should be faxing info. He is no longer with [employer].     Also, [facility] called [facility] to talk about his med change for his sertraline from 100 mg daily to 150 mg daily. The phamracy stated there was a problem with the prescription and they were not going to send it until it was resolved.could we check into it please

## 2024-04-23 DIAGNOSIS — F32.A DEPRESSION, UNSPECIFIED DEPRESSION TYPE: Primary | ICD-10-CM

## 2024-04-23 RX ORDER — FLUOXETINE HYDROCHLORIDE 40 MG/1
40 CAPSULE ORAL DAILY
Qty: 30 CAPSULE | Refills: 0 | Status: SHIPPED | OUTPATIENT
Start: 2024-04-23 | End: 2024-04-25

## 2024-04-23 NOTE — TELEPHONE ENCOUNTER
Patient returns call to office.  Per patient's Pharmacy:    I have called and spoke to the [facility].  They tell me that the insurance company wants to try:  Fluoxetine (Prozac)  Citalopram  Paroxetine  Prior to covering the Sertraline 150 mg.    Patient is willing to try Prozac.

## 2024-04-23 NOTE — TELEPHONE ENCOUNTER
I have called and spoke to this patient.  Per Dr Salazar:      Notify pt, I have called in Prozac, recheck in 3-4 weeks with me   LILY Salazar     Patient verbalizes understanding.    I have also advised him that I have received the forms from [employer].  We will fill it out and fax it back.

## 2024-04-25 ENCOUNTER — TELEPHONE (OUTPATIENT)
Dept: FAMILY MEDICINE CLINIC | Age: 69
End: 2024-04-25

## 2024-04-25 DIAGNOSIS — F32.A DEPRESSION, UNSPECIFIED DEPRESSION TYPE: Primary | ICD-10-CM

## 2024-04-25 RX ORDER — PAROXETINE HYDROCHLORIDE 20 MG/1
20 TABLET, FILM COATED ORAL DAILY
Qty: 90 TABLET | Refills: 0 | Status: SHIPPED | OUTPATIENT
Start: 2024-04-25

## 2024-04-25 NOTE — TELEPHONE ENCOUNTER
There needs to be notes to accompany the forms from [facility].  There are not recent notes in the chart that mention the patient having an Ostomy, why he has the ostomy, and that he uses the normal amount of ostomy supplies.  Please addend you last note to include this information so it can be faxed.  Thank you.

## 2024-04-25 NOTE — TELEPHONE ENCOUNTER
Pt called about needing to switch his sertraline to a different medication as a trial before they can get a prior auth for the increase in dosage for the sertraline. Per a note that Gem [facility], the insurance company will pay for Prozac, citalopram, Paroxene HCL.     You prescribed prozac and [facility] stated he would have a $250.00 copay.     I had pt call at discuss formulary medications that do not have copay's     Pt states the pharmacy told him he can try Citalopram, Escitalopram oxide, or paroxene HCL. Can you write an rx for one of these instead of the prozac as he can't afford that.     Send to [facility] pharmacy

## 2024-04-26 NOTE — TELEPHONE ENCOUNTER
I have called and left a message for this patient to return my call.  Per DR Salazar:    Let pt know I called in Yusuf now  LILY Salazar

## 2024-06-13 ENCOUNTER — OFFICE VISIT (OUTPATIENT)
Dept: FAMILY MEDICINE CLINIC | Age: 69
End: 2024-06-13

## 2024-06-13 VITALS
SYSTOLIC BLOOD PRESSURE: 138 MMHG | TEMPERATURE: 97.8 F | HEIGHT: 67 IN | RESPIRATION RATE: 12 BRPM | HEART RATE: 80 BPM | WEIGHT: 160 LBS | BODY MASS INDEX: 25.11 KG/M2 | DIASTOLIC BLOOD PRESSURE: 88 MMHG | OXYGEN SATURATION: 95 %

## 2024-06-13 DIAGNOSIS — F32.A DEPRESSION, UNSPECIFIED DEPRESSION TYPE: ICD-10-CM

## 2024-06-13 DIAGNOSIS — J44.9 CHRONIC OBSTRUCTIVE PULMONARY DISEASE, UNSPECIFIED COPD TYPE (HCC): ICD-10-CM

## 2024-06-13 DIAGNOSIS — I10 PRIMARY HYPERTENSION: ICD-10-CM

## 2024-06-13 DIAGNOSIS — N28.9 RENAL INSUFFICIENCY: ICD-10-CM

## 2024-06-13 DIAGNOSIS — M79.605 PAIN IN LEFT LEG: ICD-10-CM

## 2024-06-13 DIAGNOSIS — Z72.0 TOBACCO ABUSE: ICD-10-CM

## 2024-06-13 DIAGNOSIS — I25.118 CORONARY ARTERY DISEASE OF NATIVE HEART WITH STABLE ANGINA PECTORIS, UNSPECIFIED VESSEL OR LESION TYPE (HCC): ICD-10-CM

## 2024-06-13 DIAGNOSIS — Z00.00 MEDICARE ANNUAL WELLNESS VISIT, SUBSEQUENT: Primary | ICD-10-CM

## 2024-06-13 RX ORDER — LOSARTAN POTASSIUM 25 MG/1
25 TABLET ORAL DAILY
Qty: 90 TABLET | Refills: 0 | Status: SHIPPED | OUTPATIENT
Start: 2024-06-13

## 2024-06-13 RX ORDER — ALBUTEROL SULFATE 90 UG/1
2 AEROSOL, METERED RESPIRATORY (INHALATION) 4 TIMES DAILY PRN
Qty: 18 G | Refills: 0 | Status: SHIPPED | OUTPATIENT
Start: 2024-06-13

## 2024-06-13 RX ORDER — PAROXETINE HYDROCHLORIDE 20 MG/1
20 TABLET, FILM COATED ORAL DAILY
Qty: 90 TABLET | Refills: 0 | Status: SHIPPED | OUTPATIENT
Start: 2024-06-13

## 2024-06-13 RX ORDER — CYCLOBENZAPRINE HCL 10 MG
TABLET ORAL
Qty: 90 TABLET | Refills: 3 | Status: SHIPPED | OUTPATIENT
Start: 2024-06-13

## 2024-06-13 SDOH — ECONOMIC STABILITY: FOOD INSECURITY: WITHIN THE PAST 12 MONTHS, YOU WORRIED THAT YOUR FOOD WOULD RUN OUT BEFORE YOU GOT MONEY TO BUY MORE.: NEVER TRUE

## 2024-06-13 SDOH — ECONOMIC STABILITY: FOOD INSECURITY: WITHIN THE PAST 12 MONTHS, THE FOOD YOU BOUGHT JUST DIDN'T LAST AND YOU DIDN'T HAVE MONEY TO GET MORE.: NEVER TRUE

## 2024-06-13 SDOH — ECONOMIC STABILITY: HOUSING INSECURITY
IN THE LAST 12 MONTHS, WAS THERE A TIME WHEN YOU DID NOT HAVE A STEADY PLACE TO SLEEP OR SLEPT IN A SHELTER (INCLUDING NOW)?: NO

## 2024-06-13 SDOH — ECONOMIC STABILITY: INCOME INSECURITY: HOW HARD IS IT FOR YOU TO PAY FOR THE VERY BASICS LIKE FOOD, HOUSING, MEDICAL CARE, AND HEATING?: NOT HARD AT ALL

## 2024-06-13 ASSESSMENT — ENCOUNTER SYMPTOMS
COUGH: 1
WHEEZING: 0
SHORTNESS OF BREATH: 1

## 2024-06-13 NOTE — PROGRESS NOTES
Kali Hardy is a 69 y.o. male who presents to the office today with the following:  Chief Complaint   Patient presents with    Medicare AWV            HPI  HM reviewed    COPD  Breathing comes and goes  Gets winded with playing sport  Still smoking      HTN  Stopped HCTZ 12.5  BP too low  Had dizzy spells  BP still high  BP at home 150/90  Never tried Lisinopril or Losartan or Amlodipine    Paxil 20 mg is working now  Doing fine now  No SE    Hyperlipidemia  Not fasting     Renal insuff  Last Creatinine 1.35    Wants to quit smoking  Chantix made him mcmahan  Tried gum and helped, but expensive  Patches gave him HA  Wellbutrin made him goofy    Review of Systems   Constitutional:  Negative for fever.   HENT:  Negative for congestion.    Respiratory:  Positive for cough (with smoking) and shortness of breath. Negative for wheezing.    Musculoskeletal:         Leg pain   Neurological:  Positive for headaches (occ brief). Negative for dizziness.   Psychiatric/Behavioral:  Negative for dysphoric mood. The patient is not nervous/anxious.        See HPI.    Past Medical History:   Diagnosis Date    Bilateral hip pain     Colon polyps 2013    COPD (chronic obstructive pulmonary disease) (HCC) 04/2017    moderate    Depression     Diverticulitis     ED (erectile dysfunction)     Enteritis 03/2018    Essential hypertension, benign 2006    Hernia of abdominal wall 2017    History of DVT (deep vein thrombosis) 08/2018    History of MI (myocardial infarction) 11/10/2016    History of pulmonary embolus (PE) 08/2018    Hypercholesterolemia     IGT (impaired glucose tolerance)     Inguinal hernia 2017    (R)    Irritable bowel syndrome (IBS) 03/2018    Mononeuropathy     femoral nerve left    Paresthesia of left foot     Renal cyst     Smoker     Vitamin D deficiency 04/2021       Past Surgical History:   Procedure Laterality Date    COLONOSCOPY  2014    COLONOSCOPY      w polyps    CORONARY ARTERY BYPASS GRAFT  11/21/2016

## 2024-06-13 NOTE — PATIENT INSTRUCTIONS
serious illness that gets worse over time or can't be cured?  What are you most afraid of that might happen? (Maybe you're afraid of having pain, losing your independence, or being kept alive by machines.)  Where would you prefer to die? (Your home? A hospital? A nursing home?)  Do you want to donate your organs when you die?  Do you want certain Hoahaoism practices performed before you die?  When should you call for help?  Be sure to contact your doctor if you have any questions.  Where can you learn more?  Go to [email or web address] and enter R264 to learn more about \"Advance Directives: Care Instructions.\"  Current as of: November 16, 2023  Content Version: 14.1  © 2006-2024 [employer].   Care instructions adapted under license by [employer]. If you have questions about a medical condition or this instruction, always ask your healthcare professional. [employer] disclaims any warranty or liability for your use of this information.           A Healthy Heart: Care Instructions  Overview     Coronary artery disease, also called heart disease, occurs when a substance called plaque builds up in the vessels that supply oxygen-rich blood to your heart muscle. This can narrow the blood vessels and reduce blood flow. A heart attack happens when blood flow is completely blocked. A high-fat diet, smoking, and other factors increase the risk of heart disease.  Your doctor has found that you have a chance of having heart disease. A heart-healthy lifestyle can help keep your heart healthy and prevent heart disease. This lifestyle includes eating healthy, being active, staying at a weight that's healthy for you, and not smoking or using tobacco. It also includes taking medicines as directed, managing other health conditions, and trying to get a healthy amount of sleep.  Follow-up care is a key part of your treatment and safety. Be sure to make and go to all appointments, and call

## 2024-06-13 NOTE — PROGRESS NOTES
\"Have you been to the ER, urgent care clinic since your last visit?  Hospitalized since your last visit?\"    NO    “Have you seen or consulted any other health care providers outside of [facility] since your last visit?”    YES - When: approximately 8  weeks ago.  Where and Why: carliologist, Dr. Maldonado.            Click Here for Release of Records Request

## 2024-06-14 LAB
ANION GAP SERPL CALC-SCNC: 3 MMOL/L (ref 5–15)
BUN SERPL-MCNC: 22 MG/DL (ref 6–20)
BUN/CREAT SERPL: 15 (ref 12–20)
CALCIUM SERPL-MCNC: 8.9 MG/DL (ref 8.5–10.1)
CHLORIDE SERPL-SCNC: 103 MMOL/L (ref 97–108)
CHOLEST SERPL-MCNC: 168 MG/DL
CO2 SERPL-SCNC: 29 MMOL/L (ref 21–32)
CREAT SERPL-MCNC: 1.48 MG/DL (ref 0.7–1.3)
GLUCOSE SERPL-MCNC: 116 MG/DL (ref 65–100)
HDLC SERPL-MCNC: 56 MG/DL
HDLC SERPL: 3 (ref 0–5)
LDLC SERPL CALC-MCNC: 63.8 MG/DL (ref 0–100)
POTASSIUM SERPL-SCNC: 4.2 MMOL/L (ref 3.5–5.1)
SODIUM SERPL-SCNC: 135 MMOL/L (ref 136–145)
TRIGL SERPL-MCNC: 241 MG/DL
VLDLC SERPL CALC-MCNC: 48.2 MG/DL

## 2024-10-14 ENCOUNTER — OFFICE VISIT (OUTPATIENT)
Dept: FAMILY MEDICINE CLINIC | Age: 69
End: 2024-10-14
Payer: MEDICARE

## 2024-10-14 VITALS
WEIGHT: 152.4 LBS | RESPIRATION RATE: 16 BRPM | DIASTOLIC BLOOD PRESSURE: 85 MMHG | HEART RATE: 87 BPM | SYSTOLIC BLOOD PRESSURE: 106 MMHG | TEMPERATURE: 97.6 F | OXYGEN SATURATION: 98 % | BODY MASS INDEX: 23.92 KG/M2 | HEIGHT: 67 IN

## 2024-10-14 DIAGNOSIS — J44.9 CHRONIC OBSTRUCTIVE PULMONARY DISEASE, UNSPECIFIED COPD TYPE (HCC): ICD-10-CM

## 2024-10-14 DIAGNOSIS — I10 PRIMARY HYPERTENSION: ICD-10-CM

## 2024-10-14 DIAGNOSIS — F32.A DEPRESSION, UNSPECIFIED DEPRESSION TYPE: Primary | ICD-10-CM

## 2024-10-14 PROCEDURE — 99213 OFFICE O/P EST LOW 20 MIN: CPT | Performed by: FAMILY MEDICINE

## 2024-10-14 PROCEDURE — 3017F COLORECTAL CA SCREEN DOC REV: CPT | Performed by: FAMILY MEDICINE

## 2024-10-14 PROCEDURE — 3079F DIAST BP 80-89 MM HG: CPT | Performed by: FAMILY MEDICINE

## 2024-10-14 PROCEDURE — 1123F ACP DISCUSS/DSCN MKR DOCD: CPT | Performed by: FAMILY MEDICINE

## 2024-10-14 PROCEDURE — G8427 DOCREV CUR MEDS BY ELIG CLIN: HCPCS | Performed by: FAMILY MEDICINE

## 2024-10-14 PROCEDURE — G8484 FLU IMMUNIZE NO ADMIN: HCPCS | Performed by: FAMILY MEDICINE

## 2024-10-14 PROCEDURE — 3023F SPIROM DOC REV: CPT | Performed by: FAMILY MEDICINE

## 2024-10-14 PROCEDURE — G8420 CALC BMI NORM PARAMETERS: HCPCS | Performed by: FAMILY MEDICINE

## 2024-10-14 PROCEDURE — 3074F SYST BP LT 130 MM HG: CPT | Performed by: FAMILY MEDICINE

## 2024-10-14 PROCEDURE — 4004F PT TOBACCO SCREEN RCVD TLK: CPT | Performed by: FAMILY MEDICINE

## 2024-10-14 RX ORDER — HYDROCODONE BITARTRATE AND ACETAMINOPHEN 5; 325 MG/1; MG/1
1 TABLET ORAL 3 TIMES DAILY PRN
COMMUNITY

## 2024-10-14 RX ORDER — ALBUTEROL SULFATE 90 UG/1
2 INHALANT RESPIRATORY (INHALATION) 4 TIMES DAILY PRN
Qty: 18 G | Refills: 5 | Status: SHIPPED | OUTPATIENT
Start: 2024-10-14

## 2024-10-14 RX ORDER — PAROXETINE 20 MG/1
20 TABLET, FILM COATED ORAL DAILY
Qty: 90 TABLET | Refills: 1 | Status: SHIPPED | OUTPATIENT
Start: 2024-10-14

## 2024-10-14 NOTE — PROGRESS NOTES
\"Have you been to the ER, urgent care clinic since your last visit?  Hospitalized since your last visit?\"    YES - When: approximately 3 months ago.  Where and Why: [facility] ED.    “Have you seen or consulted any other health care providers outside our system since your last visit?”    YES - When: approximately 2  weeks ago.  Where and Why: Pain Management.           
true     Ran Out of Food in the Last Year: Never true   Transportation Needs: No Transportation Needs (7/15/2024)    [facility]    PRAPARE - Transportation     Lack of Transportation (Medical): No     Lack of Transportation (Non-Medical): No   Physical Activity: Insufficiently Active (6/13/2024)    Exercise Vital Sign     Days of Exercise per Week: 1 day     Minutes of Exercise per Session: 40 min   Intimate Partner Violence: Not At Risk (7/15/2024)    Received from [employer]    Humiliation, Afraid, Rape, and Kick questionnaire     Fear of Current or Ex-Partner: No     Emotionally Abused: No     Physically Abused: No     Sexually Abused: No   Housing Stability: Low Risk  (7/15/2024)    [facility]    Housing Stability Vital Sign     In the last 12 months, was there a time when you were not able to pay the mortgage or rent on time?: No     In the past 12 months, how many times have you moved where you were living?: 0     At any time in the past 12 months, were you homeless or living in a shelter (including now)?: No       Family History   Problem Relation Age of Onset    Hypertension Brother     Cancer Sister         cervical         Physical Exam:  /85 (Site: Right Upper Arm, Position: Sitting, Cuff Size: Medium Adult)   Pulse 87   Temp 97.6 °F (36.4 °C) (Oral)   Resp 16   Ht 1.702 m (5' 7\")   Wt 69.1 kg (152 lb 6.4 oz)   SpO2 98%   BMI 23.87 kg/m²   Physical Exam  Vitals and nursing note reviewed.   Constitutional:       Appearance: He is normal weight.   HENT:      Head: Normocephalic and atraumatic.   Eyes:      Extraocular Movements: Extraocular movements intact.      Conjunctiva/sclera: Conjunctivae normal.   Cardiovascular:      Rate and Rhythm: Normal rate and regular rhythm.      Heart sounds: Normal heart sounds.   Pulmonary:      Effort: Pulmonary effort is normal.      Breath sounds: Normal breath sounds.

## 2024-12-09 ENCOUNTER — OFFICE VISIT (OUTPATIENT)
Dept: FAMILY MEDICINE CLINIC | Age: 69
End: 2024-12-09
Payer: MEDICARE

## 2024-12-09 VITALS
RESPIRATION RATE: 12 BRPM | SYSTOLIC BLOOD PRESSURE: 178 MMHG | BODY MASS INDEX: 23.79 KG/M2 | DIASTOLIC BLOOD PRESSURE: 102 MMHG | OXYGEN SATURATION: 97 % | WEIGHT: 157 LBS | HEIGHT: 68 IN | TEMPERATURE: 97.8 F | HEART RATE: 86 BPM

## 2024-12-09 DIAGNOSIS — I25.118 CORONARY ARTERY DISEASE OF NATIVE HEART WITH STABLE ANGINA PECTORIS, UNSPECIFIED VESSEL OR LESION TYPE (HCC): ICD-10-CM

## 2024-12-09 DIAGNOSIS — J06.9 URTI (ACUTE UPPER RESPIRATORY INFECTION): Primary | ICD-10-CM

## 2024-12-09 PROCEDURE — 4004F PT TOBACCO SCREEN RCVD TLK: CPT | Performed by: FAMILY MEDICINE

## 2024-12-09 PROCEDURE — 1123F ACP DISCUSS/DSCN MKR DOCD: CPT | Performed by: FAMILY MEDICINE

## 2024-12-09 PROCEDURE — 3017F COLORECTAL CA SCREEN DOC REV: CPT | Performed by: FAMILY MEDICINE

## 2024-12-09 PROCEDURE — 3077F SYST BP >= 140 MM HG: CPT | Performed by: FAMILY MEDICINE

## 2024-12-09 PROCEDURE — 99213 OFFICE O/P EST LOW 20 MIN: CPT | Performed by: FAMILY MEDICINE

## 2024-12-09 PROCEDURE — 3080F DIAST BP >= 90 MM HG: CPT | Performed by: FAMILY MEDICINE

## 2024-12-09 PROCEDURE — G8427 DOCREV CUR MEDS BY ELIG CLIN: HCPCS | Performed by: FAMILY MEDICINE

## 2024-12-09 PROCEDURE — 1160F RVW MEDS BY RX/DR IN RCRD: CPT | Performed by: FAMILY MEDICINE

## 2024-12-09 PROCEDURE — 1125F AMNT PAIN NOTED PAIN PRSNT: CPT | Performed by: FAMILY MEDICINE

## 2024-12-09 PROCEDURE — G8484 FLU IMMUNIZE NO ADMIN: HCPCS | Performed by: FAMILY MEDICINE

## 2024-12-09 PROCEDURE — 1159F MED LIST DOCD IN RCRD: CPT | Performed by: FAMILY MEDICINE

## 2024-12-09 PROCEDURE — G8420 CALC BMI NORM PARAMETERS: HCPCS | Performed by: FAMILY MEDICINE

## 2024-12-09 RX ORDER — ROSUVASTATIN CALCIUM 5 MG/1
5 TABLET, COATED ORAL DAILY
Qty: 90 TABLET | Refills: 0 | Status: SHIPPED | OUTPATIENT
Start: 2024-12-09

## 2024-12-09 RX ORDER — ROSUVASTATIN CALCIUM 5 MG/1
5 TABLET, COATED ORAL DAILY
Qty: 90 TABLET | Refills: 0 | Status: SHIPPED | OUTPATIENT
Start: 2024-12-09 | End: 2024-12-09 | Stop reason: SDUPTHER

## 2024-12-09 ASSESSMENT — PATIENT HEALTH QUESTIONNAIRE - PHQ9
SUM OF ALL RESPONSES TO PHQ QUESTIONS 1-9: 0
2. FEELING DOWN, DEPRESSED OR HOPELESS: NOT AT ALL
SUM OF ALL RESPONSES TO PHQ QUESTIONS 1-9: 0
1. LITTLE INTEREST OR PLEASURE IN DOING THINGS: NOT AT ALL
SUM OF ALL RESPONSES TO PHQ QUESTIONS 1-9: 0
SUM OF ALL RESPONSES TO PHQ9 QUESTIONS 1 & 2: 0
SUM OF ALL RESPONSES TO PHQ QUESTIONS 1-9: 0

## 2024-12-09 ASSESSMENT — ENCOUNTER SYMPTOMS
SHORTNESS OF BREATH: 0
SORE THROAT: 0
WHEEZING: 0
DIARRHEA: 0
VOMITING: 0
COUGH: 1
NAUSEA: 0

## 2024-12-09 NOTE — PROGRESS NOTES
\"Have you been to the ER, urgent care clinic since your last visit?  Hospitalized since your last visit?\"    NO    “Have you seen or consulted any other health care providers outside our system since your last visit?”    NO           
Running Out of Food in the Last Year: Never true     Ran Out of Food in the Last Year: Never true   Transportation Needs: No Transportation Needs (7/15/2024)    Received from [facility]    PRAPARE - Transportation     Lack of Transportation (Medical): No     Lack of Transportation (Non-Medical): No   Physical Activity: Insufficiently Active (6/13/2024)    Exercise Vital Sign     Days of Exercise per Week: 1 day     Minutes of Exercise per Session: 40 min   Intimate Partner Violence: Not At Risk (7/15/2024)    Received from [facility]    Humiliation, Afraid, Rape, and Kick questionnaire     Fear of Current or Ex-Partner: No     Emotionally Abused: No     Physically Abused: No     Sexually Abused: No   Housing Stability: Low Risk  (7/15/2024)    Received from [facility]    Housing Stability Vital Sign     In the last 12 months, was there a time when you were not able to pay the mortgage or rent on time?: No     In the past 12 months, how many times have you moved where you were living?: 0     At any time in the past 12 months, were you homeless or living in a shelter (including now)?: No       Family History   Problem Relation Age of Onset    Hypertension Brother     Cancer Sister         cervical         Physical Exam:  BP (!) 178/102   Pulse 86   Temp 97.8 °F (36.6 °C)   Resp 12   Ht 1.727 m (5' 8\")   Wt 71.2 kg (157 lb)   SpO2 97%   BMI 23.87 kg/m²   Physical Exam  Vitals and nursing note reviewed.   HENT:      Head: Normocephalic and atraumatic.      Right Ear: Tympanic membrane, ear canal and external ear normal.      Left Ear: Tympanic membrane, ear canal and external ear normal.      Mouth/Throat:      Mouth: Mucous membranes are moist.      Pharynx: Oropharynx is clear.   Eyes:      Extraocular Movements: Extraocular movements intact.      Conjunctiva/sclera: Conjunctivae normal.   Cardiovascular:      Rate and Rhythm: Normal rate and regular

## 2024-12-15 NOTE — TELEPHONE ENCOUNTER
He can have regular neurontin
Left patient a message about trying Neurotin.
Rec'd faxed copy of Gralise denial. Forward to Dr. Nitesh Schmitz.
Received information that the Gralise for the patient has been denied, process an appeal for the patient and waiting for a response  Socorro Dunne Keith: Ace SIDDIQUI Case ID: [id number] Need help?  Call us at [phone]   Status   Sent to [facility]   DrugGralise 600MG tablets   [employer] and [employer] PA Form
The Gralise was denied, did you want to try anything else for this patient
No

## 2024-12-16 ENCOUNTER — OFFICE VISIT (OUTPATIENT)
Dept: FAMILY MEDICINE CLINIC | Age: 69
End: 2024-12-16
Payer: MEDICARE

## 2024-12-16 VITALS
BODY MASS INDEX: 23.49 KG/M2 | TEMPERATURE: 97 F | RESPIRATION RATE: 16 BRPM | SYSTOLIC BLOOD PRESSURE: 168 MMHG | WEIGHT: 155 LBS | DIASTOLIC BLOOD PRESSURE: 93 MMHG | HEIGHT: 68 IN | OXYGEN SATURATION: 98 % | HEART RATE: 95 BPM

## 2024-12-16 DIAGNOSIS — J44.1 OBSTRUCTIVE CHRONIC BRONCHITIS WITH ACUTE EXACERBATION (HCC): ICD-10-CM

## 2024-12-16 DIAGNOSIS — J44.9 CHRONIC OBSTRUCTIVE PULMONARY DISEASE, UNSPECIFIED COPD TYPE (HCC): Primary | ICD-10-CM

## 2024-12-16 PROCEDURE — G8427 DOCREV CUR MEDS BY ELIG CLIN: HCPCS | Performed by: FAMILY MEDICINE

## 2024-12-16 PROCEDURE — 3023F SPIROM DOC REV: CPT | Performed by: FAMILY MEDICINE

## 2024-12-16 PROCEDURE — 3077F SYST BP >= 140 MM HG: CPT | Performed by: FAMILY MEDICINE

## 2024-12-16 PROCEDURE — G8484 FLU IMMUNIZE NO ADMIN: HCPCS | Performed by: FAMILY MEDICINE

## 2024-12-16 PROCEDURE — 99213 OFFICE O/P EST LOW 20 MIN: CPT | Performed by: FAMILY MEDICINE

## 2024-12-16 PROCEDURE — 1123F ACP DISCUSS/DSCN MKR DOCD: CPT | Performed by: FAMILY MEDICINE

## 2024-12-16 PROCEDURE — G8420 CALC BMI NORM PARAMETERS: HCPCS | Performed by: FAMILY MEDICINE

## 2024-12-16 PROCEDURE — 1126F AMNT PAIN NOTED NONE PRSNT: CPT | Performed by: FAMILY MEDICINE

## 2024-12-16 PROCEDURE — 3017F COLORECTAL CA SCREEN DOC REV: CPT | Performed by: FAMILY MEDICINE

## 2024-12-16 PROCEDURE — 1159F MED LIST DOCD IN RCRD: CPT | Performed by: FAMILY MEDICINE

## 2024-12-16 PROCEDURE — 4004F PT TOBACCO SCREEN RCVD TLK: CPT | Performed by: FAMILY MEDICINE

## 2024-12-16 PROCEDURE — 3080F DIAST BP >= 90 MM HG: CPT | Performed by: FAMILY MEDICINE

## 2024-12-16 RX ORDER — BENZONATATE 100 MG/1
100 CAPSULE ORAL 3 TIMES DAILY PRN
Qty: 30 CAPSULE | Refills: 0 | Status: SHIPPED | OUTPATIENT
Start: 2024-12-16 | End: 2024-12-26

## 2024-12-16 RX ORDER — PREDNISONE 20 MG/1
TABLET ORAL
Qty: 11 TABLET | Refills: 0 | Status: SHIPPED | OUTPATIENT
Start: 2024-12-16 | End: 2024-12-24

## 2024-12-16 ASSESSMENT — PATIENT HEALTH QUESTIONNAIRE - PHQ9
SUM OF ALL RESPONSES TO PHQ QUESTIONS 1-9: 2
SUM OF ALL RESPONSES TO PHQ9 QUESTIONS 1 & 2: 2
1. LITTLE INTEREST OR PLEASURE IN DOING THINGS: SEVERAL DAYS
SUM OF ALL RESPONSES TO PHQ QUESTIONS 1-9: 2
2. FEELING DOWN, DEPRESSED OR HOPELESS: SEVERAL DAYS
SUM OF ALL RESPONSES TO PHQ QUESTIONS 1-9: 2
SUM OF ALL RESPONSES TO PHQ QUESTIONS 1-9: 2

## 2024-12-16 NOTE — PROGRESS NOTES
\"Have you been to the ER, urgent care clinic since your last visit?  Hospitalized since your last visit?\"    NO    “Have you seen or consulted any other health care providers outside our system since your last visit?”    NO    12/16/2024      Chief Complaint   Patient presents with    Cough     Follow up     Congestion     X 6 weeks          History of Present Illness:         is a 69 y.o. male with persistent cough and chest congestion despite initiating course of abx 12/9. No fever, + SOB. CT in summer demonstrated upper lobe emphysema.      Allergies   Allergen Reactions    Losartan Other (See Comments)     Severe Orthostasis    Simvastatin Myalgia     Pt takes this, says he is not allergic    Amitriptyline Rash       Current Outpatient Medications   Medication Sig Dispense Refill    predniSONE (DELTASONE) 20 MG tablet Take 2 tablets by mouth daily for 3 days, THEN 1 tablet daily for 3 days, THEN 0.5 tablets daily for 3 days. 11 tablet 0    benzonatate (TESSALON) 100 MG capsule Take 1 capsule by mouth 3 times daily as needed for Cough 30 capsule 0    amoxicillin-clavulanate (AUGMENTIN) 875-125 MG per tablet Take 1 tablet by mouth 2 times daily for 10 days 20 tablet 0    rosuvastatin (CRESTOR) 5 MG tablet Take 1 tablet by mouth daily 90 tablet 0    HYDROcodone-acetaminophen (NORCO) 5-325 MG per tablet Take 1 tablet by mouth 3 times daily as needed for Pain.      albuterol sulfate HFA (VENTOLIN HFA) 108 (90 Base) MCG/ACT inhaler Inhale 2 puffs into the lungs 4 times daily as needed for Wheezing 18 g 5    PARoxetine (PAXIL) 20 MG tablet Take 1 tablet by mouth daily 90 tablet 1    cyclobenzaprine (FLEXERIL) 10 MG tablet TAKE 1 TABLET AT NIGHT AS NEEDED 90 tablet 3    aspirin 81 MG EC tablet Take 1 tablet by mouth daily      vitamin D (CHOLECALCIFEROL) 25 MCG (1000 UT) TABS tablet Take by mouth daily      cyanocobalamin 1000 MCG tablet Take 1 tablet by mouth daily       No current facility-administered

## 2024-12-18 ENCOUNTER — HOSPITAL ENCOUNTER (OUTPATIENT)
Facility: HOSPITAL | Age: 69
Discharge: HOME | End: 2024-12-21
Payer: MEDICARE

## 2024-12-18 DIAGNOSIS — J44.1 OBSTRUCTIVE CHRONIC BRONCHITIS WITH ACUTE EXACERBATION (HCC): ICD-10-CM

## 2024-12-18 PROCEDURE — 71046 X-RAY EXAM CHEST 2 VIEWS: CPT

## 2024-12-20 ENCOUNTER — TELEPHONE (OUTPATIENT)
Dept: FAMILY MEDICINE CLINIC | Age: 69
End: 2024-12-20

## 2024-12-20 NOTE — TELEPHONE ENCOUNTER
Spoke with patient after verifying Name, and , informed patient of chest xray results and recommendations per Dr. Cristina .   Patient given an opportunity to ask questions, repeated information, and verbalized understanding.

## 2024-12-20 NOTE — TELEPHONE ENCOUNTER
Pt would like the results of his recent chest xray.     He states he is still having congestion and is having some SOB    [phone]

## 2025-01-21 ENCOUNTER — TELEPHONE (OUTPATIENT)
Dept: FAMILY MEDICINE CLINIC | Age: 70
End: 2025-01-21

## 2025-01-21 DIAGNOSIS — J44.9 CHRONIC OBSTRUCTIVE PULMONARY DISEASE, UNSPECIFIED COPD TYPE (HCC): ICD-10-CM

## 2025-01-21 DIAGNOSIS — F32.A DEPRESSION, UNSPECIFIED DEPRESSION TYPE: ICD-10-CM

## 2025-01-21 RX ORDER — ALBUTEROL SULFATE 90 UG/1
2 INHALANT RESPIRATORY (INHALATION) 4 TIMES DAILY PRN
Qty: 18 G | Refills: 5 | OUTPATIENT
Start: 2025-01-21

## 2025-01-21 NOTE — TELEPHONE ENCOUNTER
Pt calls to request     Paxil 20 mg     He will be out of it in 6 days so he needs a 90 day to [facility] and then a 30 day bridge order to [facility]

## 2025-01-21 NOTE — TELEPHONE ENCOUNTER
Requested Prescriptions     Pending Prescriptions Disp Refills    albuterol sulfate HFA (VENTOLIN HFA) 108 (90 Base) MCG/ACT inhaler 18 g 5     Sig: Inhale 2 puffs into the lungs 4 times daily as needed for Wheezing     Last seen:  12/16/24    Last filled:      albuterol sulfate HFA (VENTOLIN HFA) 108 (90 Base) MCG/ACT inhaler [[id number]]    Order Details  Dose: 2 puff Route: Inhalation Frequency: 4 TIMES DAILY PRN for Wheezing   Dispense Quantity: 18 g Refills: 5          Sig: Inhale 2 puffs into the lungs 4 times daily as needed for Wheezing         Start Date: 10/14/24       I have called the [facility] and spoke to Karo.  She confirms that there are refills on file for this medication.  She is going to process a refill for the patient.    This refill request is too soon.

## 2025-01-21 NOTE — TELEPHONE ENCOUNTER
Requested Prescriptions     Pending Prescriptions Disp Refills    PARoxetine (PAXIL) 20 MG tablet 90 tablet 1     Sig: Take 1 tablet by mouth daily     Last seen:  12/16/24    Last filled:      PARoxetine (PAXIL) 20 MG tablet [[id number]]    Order Details  Dose: 20 mg Route: Oral Frequency: DAILY   Dispense Quantity: 90 tablet Refills: 1          Sig: Take 1 tablet by mouth daily         Start Date: 10/14/24 End Date: --   Written Date: 10/14/24         I have called and left a message for this patient to return my call.  This medication was last filled on 10/14/24 for 90 days with 1 refill.  This should give him medication through 4/25.

## 2025-01-22 RX ORDER — PAROXETINE 20 MG/1
20 TABLET, FILM COATED ORAL DAILY
Qty: 90 TABLET | Refills: 1 | OUTPATIENT
Start: 2025-01-22

## 2025-01-22 NOTE — TELEPHONE ENCOUNTER
Left detailed message on patient's personalized VMB.  Per review of patient's chart:  This medication was    last filled on 10/14/24 for 90 days with 1 refill. This should give him medication through 4/25.   I suggested he call the pharmacy to have them process the refill available and if he has any trouble to call the office back.

## 2025-04-14 ENCOUNTER — OFFICE VISIT (OUTPATIENT)
Dept: FAMILY MEDICINE CLINIC | Age: 70
End: 2025-04-14
Payer: MEDICARE

## 2025-04-14 VITALS
WEIGHT: 154 LBS | DIASTOLIC BLOOD PRESSURE: 84 MMHG | BODY MASS INDEX: 23.34 KG/M2 | OXYGEN SATURATION: 97 % | TEMPERATURE: 98 F | HEIGHT: 68 IN | RESPIRATION RATE: 12 BRPM | HEART RATE: 84 BPM | SYSTOLIC BLOOD PRESSURE: 177 MMHG

## 2025-04-14 DIAGNOSIS — G43.919 INTRACTABLE MIGRAINE WITHOUT STATUS MIGRAINOSUS, UNSPECIFIED MIGRAINE TYPE: ICD-10-CM

## 2025-04-14 DIAGNOSIS — J44.1 OBSTRUCTIVE CHRONIC BRONCHITIS WITH ACUTE EXACERBATION (HCC): ICD-10-CM

## 2025-04-14 DIAGNOSIS — R73.09 ELEVATED GLUCOSE: ICD-10-CM

## 2025-04-14 DIAGNOSIS — F32.A DEPRESSION, UNSPECIFIED DEPRESSION TYPE: ICD-10-CM

## 2025-04-14 DIAGNOSIS — E78.5 HYPERLIPIDEMIA, UNSPECIFIED HYPERLIPIDEMIA TYPE: ICD-10-CM

## 2025-04-14 DIAGNOSIS — I10 PRIMARY HYPERTENSION: ICD-10-CM

## 2025-04-14 DIAGNOSIS — F41.9 ANXIETY: ICD-10-CM

## 2025-04-14 DIAGNOSIS — N18.31 STAGE 3A CHRONIC KIDNEY DISEASE (HCC): ICD-10-CM

## 2025-04-14 DIAGNOSIS — Z00.00 MEDICARE ANNUAL WELLNESS VISIT, SUBSEQUENT: Primary | ICD-10-CM

## 2025-04-14 DIAGNOSIS — J44.9 CHRONIC OBSTRUCTIVE PULMONARY DISEASE, UNSPECIFIED COPD TYPE (HCC): ICD-10-CM

## 2025-04-14 PROCEDURE — 3079F DIAST BP 80-89 MM HG: CPT | Performed by: FAMILY MEDICINE

## 2025-04-14 PROCEDURE — 1126F AMNT PAIN NOTED NONE PRSNT: CPT | Performed by: FAMILY MEDICINE

## 2025-04-14 PROCEDURE — 1123F ACP DISCUSS/DSCN MKR DOCD: CPT | Performed by: FAMILY MEDICINE

## 2025-04-14 PROCEDURE — 3077F SYST BP >= 140 MM HG: CPT | Performed by: FAMILY MEDICINE

## 2025-04-14 PROCEDURE — G0439 PPPS, SUBSEQ VISIT: HCPCS | Performed by: FAMILY MEDICINE

## 2025-04-14 PROCEDURE — 36415 COLL VENOUS BLD VENIPUNCTURE: CPT | Performed by: FAMILY MEDICINE

## 2025-04-14 PROCEDURE — 1159F MED LIST DOCD IN RCRD: CPT | Performed by: FAMILY MEDICINE

## 2025-04-14 PROCEDURE — 3017F COLORECTAL CA SCREEN DOC REV: CPT | Performed by: FAMILY MEDICINE

## 2025-04-14 PROCEDURE — G8420 CALC BMI NORM PARAMETERS: HCPCS | Performed by: FAMILY MEDICINE

## 2025-04-14 PROCEDURE — 99214 OFFICE O/P EST MOD 30 MIN: CPT | Performed by: FAMILY MEDICINE

## 2025-04-14 PROCEDURE — 3023F SPIROM DOC REV: CPT | Performed by: FAMILY MEDICINE

## 2025-04-14 PROCEDURE — 4004F PT TOBACCO SCREEN RCVD TLK: CPT | Performed by: FAMILY MEDICINE

## 2025-04-14 PROCEDURE — 1160F RVW MEDS BY RX/DR IN RCRD: CPT | Performed by: FAMILY MEDICINE

## 2025-04-14 PROCEDURE — G8427 DOCREV CUR MEDS BY ELIG CLIN: HCPCS | Performed by: FAMILY MEDICINE

## 2025-04-14 RX ORDER — CARVEDILOL 3.12 MG/1
3.12 TABLET ORAL 2 TIMES DAILY
Qty: 60 TABLET | Refills: 3 | Status: SHIPPED | OUTPATIENT
Start: 2025-04-14

## 2025-04-14 RX ORDER — BUSPIRONE HYDROCHLORIDE 5 MG/1
5 TABLET ORAL 2 TIMES DAILY
Qty: 60 TABLET | Refills: 0 | Status: SHIPPED | OUTPATIENT
Start: 2025-04-14 | End: 2025-05-14

## 2025-04-14 RX ORDER — SUMATRIPTAN 50 MG/1
50 TABLET, FILM COATED ORAL
Qty: 27 TABLET | Refills: 0 | Status: SHIPPED | OUTPATIENT
Start: 2025-04-14

## 2025-04-14 RX ORDER — ALBUTEROL SULFATE 90 UG/1
2 INHALANT RESPIRATORY (INHALATION) 4 TIMES DAILY PRN
Qty: 54 G | Refills: 1 | Status: SHIPPED | OUTPATIENT
Start: 2025-04-14

## 2025-04-14 RX ORDER — PAROXETINE 20 MG/1
20 TABLET, FILM COATED ORAL DAILY
Qty: 90 TABLET | Refills: 1 | Status: SHIPPED | OUTPATIENT
Start: 2025-04-14

## 2025-04-14 SDOH — ECONOMIC STABILITY: FOOD INSECURITY: WITHIN THE PAST 12 MONTHS, YOU WORRIED THAT YOUR FOOD WOULD RUN OUT BEFORE YOU GOT MONEY TO BUY MORE.: NEVER TRUE

## 2025-04-14 SDOH — ECONOMIC STABILITY: FOOD INSECURITY: WITHIN THE PAST 12 MONTHS, THE FOOD YOU BOUGHT JUST DIDN'T LAST AND YOU DIDN'T HAVE MONEY TO GET MORE.: NEVER TRUE

## 2025-04-14 ASSESSMENT — PATIENT HEALTH QUESTIONNAIRE - PHQ9
4. FEELING TIRED OR HAVING LITTLE ENERGY: NEARLY EVERY DAY
6. FEELING BAD ABOUT YOURSELF - OR THAT YOU ARE A FAILURE OR HAVE LET YOURSELF OR YOUR FAMILY DOWN: NEARLY EVERY DAY
10. IF YOU CHECKED OFF ANY PROBLEMS, HOW DIFFICULT HAVE THESE PROBLEMS MADE IT FOR YOU TO DO YOUR WORK, TAKE CARE OF THINGS AT HOME, OR GET ALONG WITH OTHER PEOPLE: NOT DIFFICULT AT ALL
9. THOUGHTS THAT YOU WOULD BE BETTER OFF DEAD, OR OF HURTING YOURSELF: NEARLY EVERY DAY
5. POOR APPETITE OR OVEREATING: NEARLY EVERY DAY
3. TROUBLE FALLING OR STAYING ASLEEP: NEARLY EVERY DAY
SUM OF ALL RESPONSES TO PHQ QUESTIONS 1-9: 22
7. TROUBLE CONCENTRATING ON THINGS, SUCH AS READING THE NEWSPAPER OR WATCHING TELEVISION: SEVERAL DAYS
8. MOVING OR SPEAKING SO SLOWLY THAT OTHER PEOPLE COULD HAVE NOTICED. OR THE OPPOSITE, BEING SO FIGETY OR RESTLESS THAT YOU HAVE BEEN MOVING AROUND A LOT MORE THAN USUAL: NOT AT ALL
SUM OF ALL RESPONSES TO PHQ QUESTIONS 1-9: 19
SUM OF ALL RESPONSES TO PHQ QUESTIONS 1-9: 22
1. LITTLE INTEREST OR PLEASURE IN DOING THINGS: NEARLY EVERY DAY
2. FEELING DOWN, DEPRESSED OR HOPELESS: NEARLY EVERY DAY
SUM OF ALL RESPONSES TO PHQ QUESTIONS 1-9: 22

## 2025-04-14 ASSESSMENT — COLUMBIA-SUICIDE SEVERITY RATING SCALE - C-SSRS
6. HAVE YOU EVER DONE ANYTHING, STARTED TO DO ANYTHING, OR PREPARED TO DO ANYTHING TO END YOUR LIFE?: NO
2. HAVE YOU ACTUALLY HAD ANY THOUGHTS OF KILLING YOURSELF?: NO
1. WITHIN THE PAST MONTH, HAVE YOU WISHED YOU WERE DEAD OR WISHED YOU COULD GO TO SLEEP AND NOT WAKE UP?: NO

## 2025-04-14 ASSESSMENT — ENCOUNTER SYMPTOMS: SHORTNESS OF BREATH: 1

## 2025-04-14 ASSESSMENT — LIFESTYLE VARIABLES
HOW OFTEN DO YOU HAVE A DRINK CONTAINING ALCOHOL: NEVER
HOW MANY STANDARD DRINKS CONTAINING ALCOHOL DO YOU HAVE ON A TYPICAL DAY: PATIENT DOES NOT DRINK

## 2025-04-14 NOTE — PROGRESS NOTES
Successful venipuncture in patient's lac X 1 sticks.  The patient tolerated this procedure w/o c/o pain or discomfort.\"Have you been to the ER, urgent care clinic since your last visit?  Hospitalized since your last visit?\"    NO    “Have you seen or consulted any other health care providers outside our system since your last visit?”    NO           
overdose.    Last PDMP [place] as Reviewed:  Review User Review Instant Review Result                   Inactivity:  On average, how many days per week do you engage in moderate to strenuous exercise (like a brisk walk)?: 0 days (!) Abnormal  On average, how many minutes do you engage in exercise at this level?: 0 min  Interventions:  Patient comments: walking when weather is better  Patient declined any further interventions or treatment      Dentist Screen:  Have you seen the dentist within the past year?: (!) No    Intervention:  Advised to schedule with their dentist     Vision Screen:  Do you have difficulty driving, watching TV, or doing any of your daily activities because of your eyesight?: No  Have you had an eye exam within the past year?: (!) No  Interventions:   Patient declines any further evaluation or treatment      Advanced Directives:  Do you have a Living Will?: (!) No    Intervention:  has NO advanced directive - information provided        Tobacco Use:    Tobacco Use      Smoking status: Every Day        Types: Cigarettes        Start date: 1968      Smokeless tobacco: Former     Interventions:  Patient declined any further intervention or treatment                      Objective   Vitals:    04/14/25 0823   BP: (!) 177/84   BP Site: Right Upper Arm   Patient Position: Sitting   BP Cuff Size: Large Adult   Pulse: 84   Resp: 12   Temp: 98 °F (36.7 °C)   SpO2: 97%   Weight: 69.9 kg (154 lb)   Height: 1.727 m (5' 8\")      Body mass index is 23.42 kg/m².                    Allergies   Allergen Reactions    Losartan Other (See Comments)     Severe Orthostasis    Simvastatin Myalgia     Pt takes this, says he is not allergic    Amitriptyline Rash     Prior to Visit Medications    Medication Sig Taking? Authorizing Provider   PARoxetine (PAXIL) 20 MG tablet Take 1 tablet by mouth daily Yes Chely Greenwood MD   albuterol sulfate HFA (VENTOLIN HFA) 108 (90 Base) MCG/ACT inhaler Inhale 2 puffs into the

## 2025-04-14 NOTE — PATIENT INSTRUCTIONS
decisions about your medical care may be made by a family member, or by a doctor or a  who doesn't know you.  It may help to think of an advance directive as a gift to the people who care for you. If you have one, they won't have to make tough decisions by themselves.  For more information, including forms for your state, see the [employer] website (www.[employer].org/planning/advance-directives/).  Follow-up care is a key part of your treatment and safety. Be sure to make and go to all appointments, and call your doctor if you are having problems. It's also a good idea to know your test results and keep a list of the medicines you take.  What should you include in an advance directive?  Many states have a unique advance directive form. (It may ask you to address specific issues.) Or you might use a universal form that's approved by many states.  If your form doesn't tell you what to address, it may be hard to know what to include in your advance directive. Use the questions below to help you get started.  Who do you want to make decisions about your medical care if you are not able to?  What life-support measures do you want if you have a serious illness that gets worse over time or can't be cured?  What are you most afraid of that might happen? (Maybe you're afraid of having pain, losing your independence, or being kept alive by machines.)  Where would you prefer to die? (Your home? A hospital? A nursing home?)  Do you want to donate your organs when you die?  Do you want certain Worship practices performed before you die?  When should you call for help?  Be sure to contact your doctor if you have any questions.  Where can you learn more?  Go to https://www.[employer].net/patientEd and enter R264 to learn more about \"Advance Directives: Care Instructions.\"  Current as of: March 1, 2024  Content Version: 14.4  © 7137-1860 [employer].   Care instructions adapted under license by [employer] If

## 2025-04-15 ENCOUNTER — RESULTS FOLLOW-UP (OUTPATIENT)
Dept: FAMILY MEDICINE CLINIC | Age: 70
End: 2025-04-15

## 2025-04-15 LAB
ALBUMIN SERPL-MCNC: 4.2 G/DL (ref 3.5–5)
ALBUMIN/GLOB SERPL: 1.2 (ref 1.1–2.2)
ALP SERPL-CCNC: 111 U/L (ref 45–117)
ALT SERPL-CCNC: 28 U/L (ref 12–78)
ANION GAP SERPL CALC-SCNC: 7 MMOL/L (ref 2–12)
AST SERPL-CCNC: 21 U/L (ref 15–37)
BASOPHILS # BLD: 0.08 K/UL (ref 0–0.1)
BASOPHILS NFR BLD: 0.8 % (ref 0–1)
BILIRUB SERPL-MCNC: 0.6 MG/DL (ref 0.2–1)
BUN SERPL-MCNC: 21 MG/DL (ref 6–20)
BUN/CREAT SERPL: 14 (ref 12–20)
CALCIUM SERPL-MCNC: 10 MG/DL (ref 8.5–10.1)
CHLORIDE SERPL-SCNC: 103 MMOL/L (ref 97–108)
CHOLEST SERPL-MCNC: 153 MG/DL
CO2 SERPL-SCNC: 27 MMOL/L (ref 21–32)
CREAT SERPL-MCNC: 1.48 MG/DL (ref 0.7–1.3)
DIFFERENTIAL METHOD BLD: ABNORMAL
EOSINOPHIL # BLD: 0.27 K/UL (ref 0–0.4)
EOSINOPHIL NFR BLD: 2.7 % (ref 0–7)
ERYTHROCYTE [DISTWIDTH] IN BLOOD BY AUTOMATED COUNT: 13.1 % (ref 11.5–14.5)
EST. AVERAGE GLUCOSE BLD GHB EST-MCNC: 126 MG/DL
GLOBULIN SER CALC-MCNC: 3.6 G/DL (ref 2–4)
GLUCOSE SERPL-MCNC: 126 MG/DL (ref 65–100)
HBA1C MFR BLD: 6 % (ref 4–5.6)
HCT VFR BLD AUTO: 55.2 % (ref 36.6–50.3)
HDLC SERPL-MCNC: 77 MG/DL
HDLC SERPL: 2 (ref 0–5)
HGB BLD-MCNC: 18 G/DL (ref 12.1–17)
IMM GRANULOCYTES # BLD AUTO: 0.05 K/UL (ref 0–0.04)
IMM GRANULOCYTES NFR BLD AUTO: 0.5 % (ref 0–0.5)
LDLC SERPL CALC-MCNC: 50.6 MG/DL (ref 0–100)
LYMPHOCYTES # BLD: 1.96 K/UL (ref 0.8–3.5)
LYMPHOCYTES NFR BLD: 19.4 % (ref 12–49)
MCH RBC QN AUTO: 30 PG (ref 26–34)
MCHC RBC AUTO-ENTMCNC: 32.6 G/DL (ref 30–36.5)
MCV RBC AUTO: 92 FL (ref 80–99)
MONOCYTES # BLD: 1.07 K/UL (ref 0–1)
MONOCYTES NFR BLD: 10.6 % (ref 5–13)
NEUTS SEG # BLD: 6.66 K/UL (ref 1.8–8)
NEUTS SEG NFR BLD: 66 % (ref 32–75)
NRBC # BLD: 0 K/UL (ref 0–0.01)
NRBC BLD-RTO: 0 PER 100 WBC
PLATELET # BLD AUTO: 135 K/UL (ref 150–400)
PMV BLD AUTO: 11.3 FL (ref 8.9–12.9)
POTASSIUM SERPL-SCNC: 5.1 MMOL/L (ref 3.5–5.1)
PROT SERPL-MCNC: 7.8 G/DL (ref 6.4–8.2)
RBC # BLD AUTO: 6 M/UL (ref 4.1–5.7)
SODIUM SERPL-SCNC: 137 MMOL/L (ref 136–145)
TRIGL SERPL-MCNC: 127 MG/DL
VLDLC SERPL CALC-MCNC: 25.4 MG/DL
WBC # BLD AUTO: 10.1 K/UL (ref 4.1–11.1)

## 2025-04-28 ENCOUNTER — OFFICE VISIT (OUTPATIENT)
Dept: FAMILY MEDICINE CLINIC | Age: 70
End: 2025-04-28
Payer: MEDICARE

## 2025-04-28 VITALS
BODY MASS INDEX: 23.79 KG/M2 | TEMPERATURE: 97.8 F | HEART RATE: 75 BPM | RESPIRATION RATE: 12 BRPM | OXYGEN SATURATION: 97 % | DIASTOLIC BLOOD PRESSURE: 100 MMHG | SYSTOLIC BLOOD PRESSURE: 162 MMHG | WEIGHT: 157 LBS | HEIGHT: 68 IN

## 2025-04-28 DIAGNOSIS — G43.919 INTRACTABLE MIGRAINE WITHOUT STATUS MIGRAINOSUS, UNSPECIFIED MIGRAINE TYPE: ICD-10-CM

## 2025-04-28 DIAGNOSIS — F41.9 ANXIETY: ICD-10-CM

## 2025-04-28 DIAGNOSIS — I10 PRIMARY HYPERTENSION: Primary | ICD-10-CM

## 2025-04-28 PROCEDURE — 99214 OFFICE O/P EST MOD 30 MIN: CPT | Performed by: FAMILY MEDICINE

## 2025-04-28 PROCEDURE — G8420 CALC BMI NORM PARAMETERS: HCPCS | Performed by: FAMILY MEDICINE

## 2025-04-28 PROCEDURE — G8427 DOCREV CUR MEDS BY ELIG CLIN: HCPCS | Performed by: FAMILY MEDICINE

## 2025-04-28 PROCEDURE — 4004F PT TOBACCO SCREEN RCVD TLK: CPT | Performed by: FAMILY MEDICINE

## 2025-04-28 PROCEDURE — 1125F AMNT PAIN NOTED PAIN PRSNT: CPT | Performed by: FAMILY MEDICINE

## 2025-04-28 PROCEDURE — 3077F SYST BP >= 140 MM HG: CPT | Performed by: FAMILY MEDICINE

## 2025-04-28 PROCEDURE — 3017F COLORECTAL CA SCREEN DOC REV: CPT | Performed by: FAMILY MEDICINE

## 2025-04-28 PROCEDURE — 1123F ACP DISCUSS/DSCN MKR DOCD: CPT | Performed by: FAMILY MEDICINE

## 2025-04-28 PROCEDURE — 3080F DIAST BP >= 90 MM HG: CPT | Performed by: FAMILY MEDICINE

## 2025-04-28 PROCEDURE — 1159F MED LIST DOCD IN RCRD: CPT | Performed by: FAMILY MEDICINE

## 2025-04-28 PROCEDURE — 1160F RVW MEDS BY RX/DR IN RCRD: CPT | Performed by: FAMILY MEDICINE

## 2025-04-28 RX ORDER — BUSPIRONE HYDROCHLORIDE 10 MG/1
10 TABLET ORAL 2 TIMES DAILY
Qty: 60 TABLET | Refills: 0 | Status: SHIPPED | OUTPATIENT
Start: 2025-04-28 | End: 2025-05-28

## 2025-04-28 RX ORDER — BUSPIRONE HYDROCHLORIDE 10 MG/1
10 TABLET ORAL 2 TIMES DAILY
Qty: 60 TABLET | Refills: 0 | Status: SHIPPED | OUTPATIENT
Start: 2025-04-28 | End: 2025-04-28 | Stop reason: SDUPTHER

## 2025-04-28 RX ORDER — CARVEDILOL 6.25 MG/1
6.25 TABLET ORAL 2 TIMES DAILY
Qty: 60 TABLET | Refills: 0 | Status: SHIPPED | OUTPATIENT
Start: 2025-04-28

## 2025-04-28 ASSESSMENT — PATIENT HEALTH QUESTIONNAIRE - PHQ9
1. LITTLE INTEREST OR PLEASURE IN DOING THINGS: SEVERAL DAYS
SUM OF ALL RESPONSES TO PHQ QUESTIONS 1-9: 2
2. FEELING DOWN, DEPRESSED OR HOPELESS: SEVERAL DAYS

## 2025-04-28 NOTE — PROGRESS NOTES
Kali Hardy is a 69 y.o. male who presents to the office today with the following:  Chief Complaint   Patient presents with    Medication Check     Ew meds started at last visit       HPI  Anxiety  Last visit Buspar 5 mg bid started  No SE  Would like to increase dose    HTN  Also Coreg 3.125 bid started last visit  No BP checks at home  No SE with med    Migraine  Imitrex 50 started  Took it once and it helped        Review of Systems    See HPI.    Past Medical History:   Diagnosis Date    Bilateral hip pain     Colon polyps 2013    COPD (chronic obstructive pulmonary disease) (HCC) 04/2017    moderate    Depression     Diverticulitis     ED (erectile dysfunction)     Enteritis 03/2018    Essential hypertension, benign 2006    Hernia of abdominal wall 2017    History of DVT (deep vein thrombosis) 08/2018    History of MI (myocardial infarction) 11/10/2016    History of pulmonary embolus (PE) 08/2018    Hypercholesterolemia     IGT (impaired glucose tolerance)     Inguinal hernia 2017    (R)    Irritable bowel syndrome (IBS) 03/2018    Mononeuropathy     femoral nerve left    Paresthesia of left foot     Renal cyst     Smoker     Vitamin D deficiency 04/2021       Past Surgical History:   Procedure Laterality Date    COLONOSCOPY  2014    COLONOSCOPY      w polyps    CORONARY ARTERY BYPASS GRAFT  11/21/2016    4 vessel    CORONARY ARTERY BYPASS GRAFT      HERNIA REPAIR  11/2016    paraumbilical, 2 hernias    OTHER SURGICAL HISTORY  03/2019    Cystoscopy    OTHER SURGICAL HISTORY      Aortic abdominal bypass    OTHER SURGICAL HISTORY  [id number]    nl stress myoview    SMALL INTESTINE SURGERY  08/2018    13 inches    TOTAL COLECTOMY  08/2018    total       Allergies   Allergen Reactions    Losartan Other (See Comments)     Severe Orthostasis    Simvastatin Myalgia     Pt takes this, says he is not allergic    Amitriptyline Rash       Current Outpatient Medications   Medication Sig Dispense Refill    carvedilol

## 2025-05-29 DIAGNOSIS — F41.9 ANXIETY: ICD-10-CM

## 2025-05-29 DIAGNOSIS — I10 PRIMARY HYPERTENSION: ICD-10-CM

## 2025-05-29 RX ORDER — CARVEDILOL 6.25 MG/1
6.25 TABLET ORAL 2 TIMES DAILY
Qty: 60 TABLET | Refills: 0 | Status: SHIPPED | OUTPATIENT
Start: 2025-05-29

## 2025-05-29 RX ORDER — BUSPIRONE HYDROCHLORIDE 10 MG/1
10 TABLET ORAL 2 TIMES DAILY
Qty: 60 TABLET | Refills: 0 | Status: SHIPPED | OUTPATIENT
Start: 2025-05-29

## 2025-05-29 NOTE — TELEPHONE ENCOUNTER
Patient requesting refill on     Requested Prescriptions     Pending Prescriptions Disp Refills    busPIRone (BUSPAR) 10 MG tablet [Pharmacy Med Name: busPIRone HCl Oral Tablet 10 MG] 60 tablet 11     Sig: TAKE 1 TABLET TWICE DAILY    carvedilol (COREG) 6.25 MG tablet [Pharmacy Med Name: Carvedilol Oral Tablet 6.25 MG] 60 tablet 11     Sig: TAKE 1 TABLET TWICE DAILY        Last OV 4/28/2025

## 2025-06-02 ENCOUNTER — OFFICE VISIT (OUTPATIENT)
Dept: FAMILY MEDICINE CLINIC | Age: 70
End: 2025-06-02
Payer: MEDICARE

## 2025-06-02 VITALS
RESPIRATION RATE: 12 BRPM | DIASTOLIC BLOOD PRESSURE: 102 MMHG | BODY MASS INDEX: 23.49 KG/M2 | HEART RATE: 68 BPM | WEIGHT: 155 LBS | SYSTOLIC BLOOD PRESSURE: 190 MMHG | TEMPERATURE: 98.1 F | HEIGHT: 68 IN | OXYGEN SATURATION: 98 %

## 2025-06-02 DIAGNOSIS — I10 PRIMARY HYPERTENSION: ICD-10-CM

## 2025-06-02 DIAGNOSIS — F41.9 ANXIETY: ICD-10-CM

## 2025-06-02 DIAGNOSIS — R04.2 HEMOPTYSIS: Primary | ICD-10-CM

## 2025-06-02 DIAGNOSIS — K08.89 TOOTH ACHE: ICD-10-CM

## 2025-06-02 PROCEDURE — 1125F AMNT PAIN NOTED PAIN PRSNT: CPT | Performed by: FAMILY MEDICINE

## 2025-06-02 PROCEDURE — 4004F PT TOBACCO SCREEN RCVD TLK: CPT | Performed by: FAMILY MEDICINE

## 2025-06-02 PROCEDURE — 3017F COLORECTAL CA SCREEN DOC REV: CPT | Performed by: FAMILY MEDICINE

## 2025-06-02 PROCEDURE — 99214 OFFICE O/P EST MOD 30 MIN: CPT | Performed by: FAMILY MEDICINE

## 2025-06-02 PROCEDURE — 3077F SYST BP >= 140 MM HG: CPT | Performed by: FAMILY MEDICINE

## 2025-06-02 PROCEDURE — 3080F DIAST BP >= 90 MM HG: CPT | Performed by: FAMILY MEDICINE

## 2025-06-02 PROCEDURE — G8427 DOCREV CUR MEDS BY ELIG CLIN: HCPCS | Performed by: FAMILY MEDICINE

## 2025-06-02 PROCEDURE — G8420 CALC BMI NORM PARAMETERS: HCPCS | Performed by: FAMILY MEDICINE

## 2025-06-02 PROCEDURE — 1159F MED LIST DOCD IN RCRD: CPT | Performed by: FAMILY MEDICINE

## 2025-06-02 PROCEDURE — 1123F ACP DISCUSS/DSCN MKR DOCD: CPT | Performed by: FAMILY MEDICINE

## 2025-06-02 RX ORDER — AMLODIPINE BESYLATE 5 MG/1
5 TABLET ORAL DAILY
Qty: 30 TABLET | Refills: 0 | Status: SHIPPED | OUTPATIENT
Start: 2025-06-02

## 2025-06-02 RX ORDER — AMOXICILLIN 875 MG/1
875 TABLET, COATED ORAL 2 TIMES DAILY
Qty: 20 TABLET | Refills: 0 | Status: SHIPPED | OUTPATIENT
Start: 2025-06-02 | End: 2025-06-12

## 2025-06-02 ASSESSMENT — PATIENT HEALTH QUESTIONNAIRE - PHQ9
SUM OF ALL RESPONSES TO PHQ QUESTIONS 1-9: 0
2. FEELING DOWN, DEPRESSED OR HOPELESS: NOT AT ALL
1. LITTLE INTEREST OR PLEASURE IN DOING THINGS: NOT AT ALL
SUM OF ALL RESPONSES TO PHQ QUESTIONS 1-9: 0

## 2025-06-02 ASSESSMENT — ENCOUNTER SYMPTOMS
COUGH: 1
NAUSEA: 1
WHEEZING: 0
SORE THROAT: 0
SHORTNESS OF BREATH: 1

## 2025-06-02 NOTE — PROGRESS NOTES
Kali Hardy is a 70 y.o. male who presents to the office today with the following:  Chief Complaint   Patient presents with    Congestion     Phlem with blood       HPI  HTN  Last visit Coreg was increased to 6.25 bid  No BP checks at home    Anxiety  Buspar increased to 10 mg bid last visit  Still on Paxil  Here for recheck  Was good until yesterday when upset with both sons    Since 5 d ago  Got phlegm and blood in it  Like cranberry sauce in chunks  Still smoking    Toothache  Does not have dentist  No bleeding from gums      Review of Systems   Constitutional:  Negative for fever and unexpected weight change.   HENT:  Positive for dental problem (tooth ache). Negative for congestion, postnasal drip and sore throat.    Respiratory:  Positive for cough (briefly, white with blood) and shortness of breath (little). Negative for wheezing.    Gastrointestinal:  Positive for nausea.       See HPI.    Past Medical History:   Diagnosis Date    Bilateral hip pain     Colon polyps 2013    COPD (chronic obstructive pulmonary disease) (HCC) 04/2017    moderate    Depression     Diverticulitis     ED (erectile dysfunction)     Enteritis 03/2018    Essential hypertension, benign 2006    Hernia of abdominal wall 2017    History of DVT (deep vein thrombosis) 08/2018    History of MI (myocardial infarction) 11/10/2016    History of pulmonary embolus (PE) 08/2018    Hypercholesterolemia     IGT (impaired glucose tolerance)     Inguinal hernia 2017    (R)    Irritable bowel syndrome (IBS) 03/2018    Mononeuropathy     femoral nerve left    Paresthesia of left foot     Renal cyst     Smoker     Vitamin D deficiency 04/2021       Past Surgical History:   Procedure Laterality Date    COLONOSCOPY  2014    COLONOSCOPY      w polyps    CORONARY ARTERY BYPASS GRAFT  11/21/2016    4 vessel    CORONARY ARTERY BYPASS GRAFT      HERNIA REPAIR  11/2016    paraumbilical, 2 hernias    OTHER SURGICAL HISTORY  03/2019    Cystoscopy    OTHER

## 2025-06-09 ENCOUNTER — HOSPITAL ENCOUNTER (OUTPATIENT)
Facility: HOSPITAL | Age: 70
Discharge: HOME | End: 2025-06-12
Attending: FAMILY MEDICINE
Payer: MEDICARE

## 2025-06-09 DIAGNOSIS — R04.2 HEMOPTYSIS: ICD-10-CM

## 2025-06-09 PROCEDURE — 71250 CT THORAX DX C-: CPT

## 2025-06-13 ENCOUNTER — RESULTS FOLLOW-UP (OUTPATIENT)
Dept: FAMILY MEDICINE CLINIC | Age: 70
End: 2025-06-13

## 2025-06-23 DIAGNOSIS — I10 PRIMARY HYPERTENSION: ICD-10-CM

## 2025-06-23 DIAGNOSIS — F41.9 ANXIETY: ICD-10-CM

## 2025-06-23 RX ORDER — CARVEDILOL 6.25 MG/1
6.25 TABLET ORAL 2 TIMES DAILY
Qty: 180 TABLET | Refills: 0 | Status: SHIPPED | OUTPATIENT
Start: 2025-06-23

## 2025-06-23 RX ORDER — BUSPIRONE HYDROCHLORIDE 10 MG/1
10 TABLET ORAL 2 TIMES DAILY
Qty: 180 TABLET | Refills: 0 | Status: SHIPPED | OUTPATIENT
Start: 2025-06-23

## 2025-06-23 NOTE — TELEPHONE ENCOUNTER
Patient requesting refill on     Requested Prescriptions     Pending Prescriptions Disp Refills    busPIRone (BUSPAR) 10 MG tablet 60 tablet 0     Sig: Take 1 tablet by mouth 2 times daily    carvedilol (COREG) 6.25 MG tablet 60 tablet 0     Sig: Take 1 tablet by mouth 2 times daily        Last OV 6/2/2025

## 2025-06-29 DIAGNOSIS — I10 PRIMARY HYPERTENSION: ICD-10-CM

## 2025-06-29 RX ORDER — AMLODIPINE BESYLATE 5 MG/1
5 TABLET ORAL DAILY
Qty: 15 TABLET | Refills: 0 | Status: SHIPPED | OUTPATIENT
Start: 2025-06-29

## 2025-07-07 ENCOUNTER — OFFICE VISIT (OUTPATIENT)
Dept: FAMILY MEDICINE CLINIC | Age: 70
End: 2025-07-07
Payer: MEDICARE

## 2025-07-07 VITALS
WEIGHT: 157 LBS | BODY MASS INDEX: 23.79 KG/M2 | TEMPERATURE: 97.8 F | HEART RATE: 73 BPM | RESPIRATION RATE: 12 BRPM | DIASTOLIC BLOOD PRESSURE: 82 MMHG | HEIGHT: 68 IN | OXYGEN SATURATION: 98 % | SYSTOLIC BLOOD PRESSURE: 130 MMHG

## 2025-07-07 DIAGNOSIS — I10 PRIMARY HYPERTENSION: ICD-10-CM

## 2025-07-07 DIAGNOSIS — R93.89 ABNORMAL CT OF THE CHEST: Primary | ICD-10-CM

## 2025-07-07 PROCEDURE — 99213 OFFICE O/P EST LOW 20 MIN: CPT | Performed by: FAMILY MEDICINE

## 2025-07-07 PROCEDURE — 1159F MED LIST DOCD IN RCRD: CPT | Performed by: FAMILY MEDICINE

## 2025-07-07 PROCEDURE — 4004F PT TOBACCO SCREEN RCVD TLK: CPT | Performed by: FAMILY MEDICINE

## 2025-07-07 PROCEDURE — 3078F DIAST BP <80 MM HG: CPT | Performed by: FAMILY MEDICINE

## 2025-07-07 PROCEDURE — 1123F ACP DISCUSS/DSCN MKR DOCD: CPT | Performed by: FAMILY MEDICINE

## 2025-07-07 PROCEDURE — G8427 DOCREV CUR MEDS BY ELIG CLIN: HCPCS | Performed by: FAMILY MEDICINE

## 2025-07-07 PROCEDURE — 1126F AMNT PAIN NOTED NONE PRSNT: CPT | Performed by: FAMILY MEDICINE

## 2025-07-07 PROCEDURE — 3075F SYST BP GE 130 - 139MM HG: CPT | Performed by: FAMILY MEDICINE

## 2025-07-07 PROCEDURE — 3017F COLORECTAL CA SCREEN DOC REV: CPT | Performed by: FAMILY MEDICINE

## 2025-07-07 PROCEDURE — G8420 CALC BMI NORM PARAMETERS: HCPCS | Performed by: FAMILY MEDICINE

## 2025-07-07 RX ORDER — AMLODIPINE BESYLATE 5 MG/1
5 TABLET ORAL DAILY
Qty: 30 TABLET | Refills: 0 | Status: SHIPPED | OUTPATIENT
Start: 2025-07-07

## 2025-07-07 ASSESSMENT — PATIENT HEALTH QUESTIONNAIRE - PHQ9
SUM OF ALL RESPONSES TO PHQ QUESTIONS 1-9: 0
1. LITTLE INTEREST OR PLEASURE IN DOING THINGS: NOT AT ALL
2. FEELING DOWN, DEPRESSED OR HOPELESS: NOT AT ALL

## 2025-07-07 NOTE — PROGRESS NOTES
Kali Hardy is a 70 y.o. male who presents to the office today with the following:  Chief Complaint   Patient presents with    Medication Refill       Medication Refill        HTN  Last visit Amlodipine 5 mg was started  Here for recheck of BP  No SE, occ has little swelling in left leg d/t nerve damage  BP at home 140/90    Needs refills    Had CT chest done which showed multiple pulmonary nodules  And right medial opacity  Needs recheck after 4-6 weeks    Hemoptysis went away    Review of Systems    See HPI.    Past Medical History:   Diagnosis Date    Bilateral hip pain     Colon polyps 2013    COPD (chronic obstructive pulmonary disease) (HCC) 04/2017    moderate    Depression     Diverticulitis     ED (erectile dysfunction)     Enteritis 03/2018    Essential hypertension, benign 2006    Hernia of abdominal wall 2017    History of DVT (deep vein thrombosis) 08/2018    History of MI (myocardial infarction) 11/10/2016    History of pulmonary embolus (PE) 08/2018    Hypercholesterolemia     IGT (impaired glucose tolerance)     Inguinal hernia 2017    (R)    Irritable bowel syndrome (IBS) 03/2018    Mononeuropathy     femoral nerve left    Paresthesia of left foot     Renal cyst     Smoker     Vitamin D deficiency 04/2021       Past Surgical History:   Procedure Laterality Date    COLONOSCOPY  2014    COLONOSCOPY      w polyps    CORONARY ARTERY BYPASS GRAFT  11/21/2016    4 vessel    CORONARY ARTERY BYPASS GRAFT      HERNIA REPAIR  11/2016    paraumbilical, 2 hernias    OTHER SURGICAL HISTORY  03/2019    Cystoscopy    OTHER SURGICAL HISTORY      Aortic abdominal bypass    OTHER SURGICAL HISTORY  [id number]    nl stress myoview    SMALL INTESTINE SURGERY  08/2018    13 inches    TOTAL COLECTOMY  08/2018    total       Allergies   Allergen Reactions    Losartan Other (See Comments)     Severe Orthostasis    Simvastatin Myalgia     Pt takes this, says he is not allergic    Amitriptyline Rash       Current Outpatient

## 2025-07-16 DIAGNOSIS — G43.919 INTRACTABLE MIGRAINE WITHOUT STATUS MIGRAINOSUS, UNSPECIFIED MIGRAINE TYPE: ICD-10-CM

## 2025-07-16 RX ORDER — SUMATRIPTAN 50 MG/1
50 TABLET, FILM COATED ORAL
Qty: 27 TABLET | Refills: 3 | Status: SHIPPED | OUTPATIENT
Start: 2025-07-16

## 2025-07-16 NOTE — TELEPHONE ENCOUNTER
Patient requesting refill on     Requested Prescriptions     Pending Prescriptions Disp Refills    SUMAtriptan (IMITREX) 50 MG tablet [Pharmacy Med Name: SUMATRIPTAN SUCCINATE 50 MG Oral Tablet] 27 tablet 3     Sig: Take 1 tablet by mouth once as needed for Migraine        Last OV 7/7/2025

## 2025-07-21 ENCOUNTER — TELEPHONE (OUTPATIENT)
Dept: FAMILY MEDICINE CLINIC | Age: 70
End: 2025-07-21

## 2025-08-18 ENCOUNTER — OFFICE VISIT (OUTPATIENT)
Dept: FAMILY MEDICINE CLINIC | Age: 70
End: 2025-08-18

## 2025-08-18 VITALS
SYSTOLIC BLOOD PRESSURE: 152 MMHG | WEIGHT: 155 LBS | TEMPERATURE: 98.1 F | RESPIRATION RATE: 16 BRPM | HEART RATE: 87 BPM | DIASTOLIC BLOOD PRESSURE: 88 MMHG | HEIGHT: 68 IN | OXYGEN SATURATION: 98 % | BODY MASS INDEX: 23.49 KG/M2

## 2025-08-18 DIAGNOSIS — H61.23 BILATERAL IMPACTED CERUMEN: Primary | ICD-10-CM

## 2025-08-18 DIAGNOSIS — F32.A DEPRESSION, UNSPECIFIED DEPRESSION TYPE: ICD-10-CM

## 2025-08-18 RX ORDER — PAROXETINE 30 MG/1
30 TABLET, FILM COATED ORAL DAILY
Qty: 30 TABLET | Refills: 0 | Status: SHIPPED | OUTPATIENT
Start: 2025-08-18

## 2025-08-18 ASSESSMENT — PATIENT HEALTH QUESTIONNAIRE - PHQ9
SUM OF ALL RESPONSES TO PHQ QUESTIONS 1-9: 12
SUM OF ALL RESPONSES TO PHQ QUESTIONS 1-9: 12
7. TROUBLE CONCENTRATING ON THINGS, SUCH AS READING THE NEWSPAPER OR WATCHING TELEVISION: NOT AT ALL
1. LITTLE INTEREST OR PLEASURE IN DOING THINGS: SEVERAL DAYS
10. IF YOU CHECKED OFF ANY PROBLEMS, HOW DIFFICULT HAVE THESE PROBLEMS MADE IT FOR YOU TO DO YOUR WORK, TAKE CARE OF THINGS AT HOME, OR GET ALONG WITH OTHER PEOPLE: VERY DIFFICULT
9. THOUGHTS THAT YOU WOULD BE BETTER OFF DEAD, OR OF HURTING YOURSELF: NOT AT ALL
2. FEELING DOWN, DEPRESSED OR HOPELESS: NEARLY EVERY DAY
6. FEELING BAD ABOUT YOURSELF - OR THAT YOU ARE A FAILURE OR HAVE LET YOURSELF OR YOUR FAMILY DOWN: SEVERAL DAYS
5. POOR APPETITE OR OVEREATING: SEVERAL DAYS
4. FEELING TIRED OR HAVING LITTLE ENERGY: NEARLY EVERY DAY
3. TROUBLE FALLING OR STAYING ASLEEP: NEARLY EVERY DAY
SUM OF ALL RESPONSES TO PHQ QUESTIONS 1-9: 12
SUM OF ALL RESPONSES TO PHQ QUESTIONS 1-9: 12
8. MOVING OR SPEAKING SO SLOWLY THAT OTHER PEOPLE COULD HAVE NOTICED. OR THE OPPOSITE, BEING SO FIGETY OR RESTLESS THAT YOU HAVE BEEN MOVING AROUND A LOT MORE THAN USUAL: NOT AT ALL

## 2025-08-26 DIAGNOSIS — I10 PRIMARY HYPERTENSION: ICD-10-CM

## 2025-08-26 RX ORDER — AMLODIPINE BESYLATE 5 MG/1
5 TABLET ORAL DAILY
Qty: 30 TABLET | Refills: 0 | Status: SHIPPED | OUTPATIENT
Start: 2025-08-26